# Patient Record
Sex: FEMALE | Race: BLACK OR AFRICAN AMERICAN | Employment: FULL TIME | URBAN - METROPOLITAN AREA
[De-identification: names, ages, dates, MRNs, and addresses within clinical notes are randomized per-mention and may not be internally consistent; named-entity substitution may affect disease eponyms.]

---

## 2017-01-03 ENCOUNTER — HOSPITAL ENCOUNTER (OUTPATIENT)
Dept: RADIATION THERAPY | Age: 47
Discharge: HOME | End: 2017-01-03
Payer: COMMERCIAL

## 2017-01-04 ENCOUNTER — HOSPITAL ENCOUNTER (OUTPATIENT)
Dept: RADIATION THERAPY | Age: 47
Discharge: HOME | End: 2017-01-04
Payer: COMMERCIAL

## 2017-01-04 PROCEDURE — 77412 RADIATION TX DELIVERY LVL 3: CPT

## 2017-01-05 ENCOUNTER — HOSPITAL ENCOUNTER (OUTPATIENT)
Dept: RADIATION THERAPY | Age: 47
Discharge: HOME | End: 2017-01-05
Payer: COMMERCIAL

## 2017-01-05 PROCEDURE — 77412 RADIATION TX DELIVERY LVL 3: CPT

## 2017-01-06 ENCOUNTER — HOSPITAL ENCOUNTER (OUTPATIENT)
Dept: RADIATION THERAPY | Age: 47
Discharge: HOME | End: 2017-01-06
Payer: COMMERCIAL

## 2017-01-06 PROCEDURE — 77412 RADIATION TX DELIVERY LVL 3: CPT

## 2017-01-09 ENCOUNTER — HOSPITAL ENCOUNTER (OUTPATIENT)
Dept: RADIATION THERAPY | Age: 47
Discharge: HOME | End: 2017-01-09
Payer: COMMERCIAL

## 2017-01-10 ENCOUNTER — HOSPITAL ENCOUNTER (OUTPATIENT)
Dept: RADIATION THERAPY | Age: 47
Discharge: HOME | End: 2017-01-10
Payer: COMMERCIAL

## 2017-01-10 PROCEDURE — 77412 RADIATION TX DELIVERY LVL 3: CPT

## 2017-01-11 ENCOUNTER — HOSPITAL ENCOUNTER (OUTPATIENT)
Dept: RADIATION THERAPY | Age: 47
Discharge: HOME | End: 2017-01-11
Payer: COMMERCIAL

## 2017-01-11 ENCOUNTER — APPOINTMENT (OUTPATIENT)
Dept: INFUSION THERAPY | Age: 47
End: 2017-01-11

## 2017-01-12 ENCOUNTER — DOCUMENTATION ONLY (OUTPATIENT)
Dept: SURGERY | Age: 47
End: 2017-01-12

## 2017-01-12 NOTE — PROGRESS NOTES
Confirm appointment/location w/patient. Advised pt to bring id & Insurance.  Per Patient, she has aetna have not received ins card

## 2017-01-13 ENCOUNTER — HOSPITAL ENCOUNTER (OUTPATIENT)
Dept: RADIATION THERAPY | Age: 47
Discharge: HOME | End: 2017-01-13
Payer: COMMERCIAL

## 2017-01-13 ENCOUNTER — OFFICE VISIT (OUTPATIENT)
Dept: SURGERY | Age: 47
End: 2017-01-13

## 2017-01-13 ENCOUNTER — TELEPHONE (OUTPATIENT)
Dept: SURGERY | Age: 47
End: 2017-01-13

## 2017-01-13 VITALS
HEIGHT: 63 IN | BODY MASS INDEX: 40.22 KG/M2 | DIASTOLIC BLOOD PRESSURE: 70 MMHG | WEIGHT: 227 LBS | HEART RATE: 81 BPM | SYSTOLIC BLOOD PRESSURE: 118 MMHG

## 2017-01-13 DIAGNOSIS — C50.411 MALIGNANT NEOPLASM OF UPPER-OUTER QUADRANT OF RIGHT FEMALE BREAST (HCC): Primary | ICD-10-CM

## 2017-01-13 PROBLEM — C50.919 BREAST CANCER (HCC): Status: ACTIVE | Noted: 2017-01-13

## 2017-01-13 NOTE — PROGRESS NOTES
HISTORY OF PRESENT ILLNESS  Luana Godoy is a 51 y.o. female. HPI  ESTABLISHED patient here for follow-up of RIGHT breast cancer. She is starting to feel much better. Had terrible fatigue with the XRT as well as \"nerve issues\" and had to stop. Did complete all but eight radiation treatments and last treatment was 1/10/16. Was so fatigued the next day that she had to stay in bed all day. Is having no problems with her breast at this point. Would like to have her port removed. 01/29/2016: RIGHT core bx of IMC with [facility]. ER+100%/TN+90% Her2- ([facility]: ER+80%/TN+20%)  03/31/2016: RIGHT lumpectomy with SNBx of 1.9 cm, gr2 ILC with DCIS and LCIS. 2/2 nodes involved. Clear margins.  pT1c pN1a Mx  05/17/2016: RIGHT ALND. 0/10 nodes involved  08/18/2016: completed adjuvant chemo (TC x4)  09/23/2016: started XRT, completed all but eight treatments with last treatment on 1/10/17    Past Medical History   Diagnosis Date    Arthritis      HANDS AND KNEES    Asthma     Cancer ([facility] [address] 2016     RIGHT breast cancer    Depression     GERD (gastroesophageal reflux disease)     Heart murmur     Hypertension     PUD (peptic ulcer disease)     Vertigo        Past Surgical History   Procedure Laterality Date    Hx cholecystectomy      Hx gyn       cesarian x 3    Hx hysterectomy       Still has ovaries    Hx gi       COLONOSCOPY    Hx urological       CYSTOSCOPY    Hx urological       BLADDER SLING    Hx orthopaedic Left      ARM FX WITH PINS    Hx tonsillectomy      Hx breast biopsy Right      SEVERAL TIMES    Hx breast lumpectomy Right 3/31/2016     RIGHT BREAST LUMPECTOMY, RIGHT SENTINEL NODE BIOPSY WITH ULTRASOUND performed by Fritz Zapata MD at [address] breast surgery procedure unlisted Right 5/17/16     RIGHT axillary lymph node dissection and PAC insertion       Social History     Social History    Marital status:      Spouse name: N/A    Number of children: N/A    Years of education: N/A     Occupational History    Not on file. Social History Main Topics    Smoking status: Never Smoker    Smokeless tobacco: Never Used    Alcohol use 0.0 oz/week     0 Standard drinks or equivalent per week      Comment: occasionally a glass of wine    Drug use: No    Sexual activity: Yes     Partners: Male     Birth control/ protection: None     Other Topics Concern    Not on file     Social History Narrative    Recently moved from [place], they have been here since April. . Current Outpatient Prescriptions on File Prior to Visit   Medication Sig Dispense Refill    diclofenac EC (VOLTAREN) 25 mg EC tablet TK 1 T PO  QID  0    cyanocobalamin 1,000 mcg tablet Take 1,000 mcg by mouth daily.  gabapentin (NEURONTIN) 100 mg capsule Take 1 Cap by mouth three (3) times daily. 90 Cap 1    zolpidem (AMBIEN) 10 mg tablet 20 mg.  2    VENTOLIN HFA 90 mcg/actuation inhaler       citalopram (CELEXA) 40 mg tablet Take 40 mg by mouth nightly.  losartan (COZAAR) 25 mg tablet Take 1 Tab by mouth daily. 90 Tab 1     No current facility-administered medications on file prior to visit. Allergies   Allergen Reactions    Adhesive Tape-Silicones Rash and Itching    Darvocet A500 [Propoxyphene N-Acetaminophen] Nausea Only and Other (comments)     headache    Diazepam Other (comments)     Not an allergy, just does not work for her at all.  Hydrocodone Nausea and Vomiting and Vertigo    Percocet [Oxycodone-Acetaminophen] Nausea Only and Other (comments)     headache    Tylenol-Codeine #2 Itching, Nausea Only and Other (comments)     HEADACHES         OB History     Obstetric Comments    Menarche:  8. LMP: 38.  # of Children:  3. Age at Delivery of First Child:  20.   Hysterectomy/oophorectomy:  YES/NO. Breast Bx:  Yes,. Hx of Breast Feeding:  Yes. BCP:  Yes, in the past. Hormone therapy:  Yes. ROS  Constitutional: Negative    HENT: Negative.    Eyes: Negative. Respiratory: Negative. Cardiovascular: Negative. Gastrointestinal: Negative. Genitourinary: Negative. Musculoskeletal: Negative. Skin: Negative. Neurological: Negative. Endo/Heme/Allergies: Negative. Psychiatric/Behavioral: Negative. Physical Exam   Cardiovascular: Normal rate and normal heart sounds. Pulmonary/Chest: Breath sounds normal. Right breast exhibits no inverted nipple, no mass, no nipple discharge, no skin change and no tenderness. Left breast exhibits no inverted nipple, no mass, no nipple discharge, no skin change and no tenderness. Breasts are symmetrical.       Lymphadenopathy:        Right cervical: No superficial cervical, no deep cervical and no posterior cervical adenopathy present. Left cervical: No superficial cervical, no deep cervical and no posterior cervical adenopathy present. Right axillary: No pectoral and no lateral adenopathy present. Left axillary: No pectoral and no lateral adenopathy present. ASSESSMENT and PLAN    ICD-10-CM ICD-9-CM    1. Malignant neoplasm of upper-outer quadrant of right female breast (HCC) C50.411 174.4      Pt s/p XRT for RIGHT BC and doing well. Will schedule port removal. Will schedule mammo in 6 months and f/u with Cam Gianni (NP) then. This plan was reviewed with the patient and patient agrees. All questions were answered.     Written by Marie Dickey, as dictated by Dr. Candance Reading, MD.

## 2017-01-13 NOTE — COMMUNICATION BODY
HISTORY OF PRESENT ILLNESS  Luana Godoy is a 51 y.o. female. HPI  ESTABLISHED patient here for follow-up of RIGHT breast cancer. She is starting to feel much better. Had terrible fatigue with the XRT as well as \"nerve issues\" and had to stop. Did complete all but eight radiation treatments and last treatment was 1/10/16. Was so fatigued the next day that she had to stay in bed all day. Is having no problems with her breast at this point. Would like to have her port removed. 01/29/2016: RIGHT core bx of IMC with [facility]. ER+100%/AK+90% Her2- ([facility]: ER+80%/AK+20%)  03/31/2016: RIGHT lumpectomy with SNBx of 1.9 cm, gr2 ILC with DCIS and LCIS. 2/2 nodes involved. Clear margins.  pT1c pN1a Mx  05/17/2016: RIGHT ALND. 0/10 nodes involved  08/18/2016: completed adjuvant chemo (TC x4)  09/23/2016: started XRT, completed all but eight treatments with last treatment on 1/10/17    Past Medical History   Diagnosis Date    Arthritis      HANDS AND KNEES    Asthma     Cancer ([facility] [address] 2016     RIGHT breast cancer    Depression     GERD (gastroesophageal reflux disease)     Heart murmur     Hypertension     PUD (peptic ulcer disease)     Vertigo        Past Surgical History   Procedure Laterality Date    Hx cholecystectomy      Hx gyn       cesarian x 3    Hx hysterectomy       Still has ovaries    Hx gi       COLONOSCOPY    Hx urological       CYSTOSCOPY    Hx urological       BLADDER SLING    Hx orthopaedic Left      ARM FX WITH PINS    Hx tonsillectomy      Hx breast biopsy Right      SEVERAL TIMES    Hx breast lumpectomy Right 3/31/2016     RIGHT BREAST LUMPECTOMY, RIGHT SENTINEL NODE BIOPSY WITH ULTRASOUND performed by Fritz Zapata MD at [address] breast surgery procedure unlisted Right 5/17/16     RIGHT axillary lymph node dissection and PAC insertion       Social History     Social History    Marital status:      Spouse name: N/A    Number of children: N/A    Years of education: N/A     Occupational History    Not on file. Social History Main Topics    Smoking status: Never Smoker    Smokeless tobacco: Never Used    Alcohol use 0.0 oz/week     0 Standard drinks or equivalent per week      Comment: occasionally a glass of wine    Drug use: No    Sexual activity: Yes     Partners: Male     Birth control/ protection: None     Other Topics Concern    Not on file     Social History Narrative    Recently moved from [place], they have been here since April. . Current Outpatient Prescriptions on File Prior to Visit   Medication Sig Dispense Refill    diclofenac EC (VOLTAREN) 25 mg EC tablet TK 1 T PO  QID  0    cyanocobalamin 1,000 mcg tablet Take 1,000 mcg by mouth daily.  gabapentin (NEURONTIN) 100 mg capsule Take 1 Cap by mouth three (3) times daily. 90 Cap 1    zolpidem (AMBIEN) 10 mg tablet 20 mg.  2    VENTOLIN HFA 90 mcg/actuation inhaler       citalopram (CELEXA) 40 mg tablet Take 40 mg by mouth nightly.  losartan (COZAAR) 25 mg tablet Take 1 Tab by mouth daily. 90 Tab 1     No current facility-administered medications on file prior to visit. Allergies   Allergen Reactions    Adhesive Tape-Silicones Rash and Itching    Darvocet A500 [Propoxyphene N-Acetaminophen] Nausea Only and Other (comments)     headache    Diazepam Other (comments)     Not an allergy, just does not work for her at all.  Hydrocodone Nausea and Vomiting and Vertigo    Percocet [Oxycodone-Acetaminophen] Nausea Only and Other (comments)     headache    Tylenol-Codeine #2 Itching, Nausea Only and Other (comments)     HEADACHES         OB History     Obstetric Comments    Menarche:  8. LMP: 38.  # of Children:  3. Age at Delivery of First Child:  20.   Hysterectomy/oophorectomy:  YES/NO. Breast Bx:  Yes,. Hx of Breast Feeding:  Yes. BCP:  Yes, in the past. Hormone therapy:  Yes. ROS  Constitutional: Negative    HENT: Negative.    Eyes: Negative. Respiratory: Negative. Cardiovascular: Negative. Gastrointestinal: Negative. Genitourinary: Negative. Musculoskeletal: Negative. Skin: Negative. Neurological: Negative. Endo/Heme/Allergies: Negative. Psychiatric/Behavioral: Negative. Physical Exam   Cardiovascular: Normal rate and normal heart sounds. Pulmonary/Chest: Breath sounds normal. Right breast exhibits no inverted nipple, no mass, no nipple discharge, no skin change and no tenderness. Left breast exhibits no inverted nipple, no mass, no nipple discharge, no skin change and no tenderness. Breasts are symmetrical.       Lymphadenopathy:        Right cervical: No superficial cervical, no deep cervical and no posterior cervical adenopathy present. Left cervical: No superficial cervical, no deep cervical and no posterior cervical adenopathy present. Right axillary: No pectoral and no lateral adenopathy present. Left axillary: No pectoral and no lateral adenopathy present. ASSESSMENT and PLAN    ICD-10-CM ICD-9-CM    1. Malignant neoplasm of upper-outer quadrant of right female breast (HCC) C50.411 174.4      Pt s/p XRT for RIGHT BC and doing well. Will schedule port removal. Will schedule mammo in 6 months and f/u with [facility] (NP) then. This plan was reviewed with the patient and patient agrees. All questions were answered.     Written by Nino Zapata, as dictated by Dr. Umair Samuels MD.

## 2017-01-13 NOTE — PATIENT INSTRUCTIONS
Dealing With Being Tired From Cancer Treatment: Care Instructions  Your Care Instructions  Cancer, some complications from cancer, and some cancer treatments can make you feel very tired. But there are things you can do to feel better. Try to get extra rest while you have chemotherapy or radiation therapy. You may be able to stay with much of your routine, with some extra breaks for rest. It is important to stay as active as you can during treatment. Staying active can help you have more energy. Feeling tired is often worse at the end of these treatments or just after they end. Your doctor may prescribe medicines to help boost your energy. Follow-up care is a key part of your treatment and safety. Be sure to make and go to all appointments, and call your doctor if you are having problems. It's also a good idea to know your test results and keep a list of the medicines you take. How can you care for yourself at home? · Slowly increase your activity to have more energy. Try walking as your energy allows. · Take breaks during the day to rest.  · If your doctor prescribes medicines to help with your energy, take them exactly as prescribed. Call your doctor if you think you are having a problem with your medicine. · Keep a list of all your medicines, and review them with your doctor at your visits. · Eat healthy foods. A diet that contains fruits, vegetables, and whole grains may increase your energy level. Limit alcohol, which can cause dehydration and make you feel more tired. Drink plenty of fluids. Do not skip meals, especially breakfast.  · Do not smoke. It can make you feel more tired. If you need help quitting, talk to your doctor about stop-smoking programs and medicines. These can increase your chances of quitting for good. · If you have trouble getting a good night's sleep, try to:  ¨ Keep your room dark and quiet. If needed, wear earplugs or use a sound machine to cover noises.   ¨ Do not eat just before you go to bed. ¨ Do not read or watch TV in bed. · Plan activities for the time of day when you have the most energy. This way you can plan ahead to do what you want to do. When should you call for help? Call your doctor now or seek immediate medical care if:  · You feel very depressed or anxious. Watch closely for changes in your health, and be sure to contact your doctor if:  · You are still very tired even with home treatment. · You get more and more tired. Where can you learn more? Go to http://pascual-senthil.info/. Enter H458 in the search box to learn more about \"Dealing With Being Tired From Cancer Treatment: Care Instructions. \"  Current as of: July 26, 2016  Content Version: 11.1  © 4754-0305 [employer]. Care instructions adapted under license by [employer] (which disclaims liability or warranty for this information). If you have questions about a medical condition or this instruction, always ask your healthcare professional. [address] any warranty or liability for your use of this information.

## 2017-01-13 NOTE — MR AVS SNAPSHOT
Visit Information Date & Time Provider Department Dept. Phone Encounter #  
 1/13/2017 55:28 AM Jose Greer MD [address] at [address] [place] [address] [id number] Your Appointments 7/11/2017  1:30 PM  
Follow Up with [address] at [employer] [facility]) Appt Note: LEFT breast 6 month follow up Cp$0 1/13/17; LEFT breast 6 month follow up Cp$0 1/13/17 [address]  
  
   
 [address] Upcoming Health Maintenance Date Due Pneumococcal 19-64 Highest Risk (1 of 3 - PCV13) 10/21/1988 DTaP/Tdap/Td series (1 - Tdap) 10/21/1990 PAP AKA CERVICAL CYTOLOGY 10/21/1990 INFLUENZA AGE 9 TO ADULT 8/1/2016 Allergies as of 1/13/2017  Review Complete On: 1/13/2017 By: Jolyne Bosworth, RN Severity Noted Reaction Type Reactions Adhesive Tape-silicones  43/46/3277    Rash, Itching Darvocet A500 [Propoxyphene N-acetaminophen]  01/29/2016    Nausea Only, Other (comments)  
 headache Diazepam  05/04/2016    Other (comments) Not an allergy, just does not work for her at all. Hydrocodone  04/25/2016    Nausea and Vomiting, Vertigo Percocet [Oxycodone-acetaminophen]  01/29/2016    Nausea Only, Other (comments)  
 headache Tylenol-codeine #2  03/24/2016    Itching, Nausea Only, Other (comments) HEADACHES Current Immunizations  Reviewed on 11/30/2016 No immunizations on file. Not reviewed this visit Vitals BP Pulse Height(growth percentile) Weight(growth percentile) LMP BMI  
 118/70 (BP 1 Location: Left arm, BP Patient Position: Sitting) 81 5' 3\" (1.6 m) 227 lb (103 kg) 07/04/2014 (Approximate) 40.21 kg/m2 OB Status Smoking Status Hysterectomy Never Smoker BMI and BSA Data  Body Mass Index Body Surface Area  
 40.21 kg/m 2 2.14 m 2  
  
  
 Preferred Pharmacy Pharmacy Name Phone [facility] Kenan Constantino, [address] [facility] [phone] Your Updated Medication List  
  
   
This list is accurate as of: 1/13/17 12:39 PM.  Always use your most recent med list.  
  
  
  
  
 citalopram 40 mg tablet Commonly known as:  John Mao Take 40 mg by mouth nightly. cyanocobalamin 1,000 mcg tablet Take 1,000 mcg by mouth daily. diclofenac EC 25 mg EC tablet Commonly known as:  VOLTAREN  
TK 1 T PO  QID  
  
 gabapentin 100 mg capsule Commonly known as:  NEURONTIN Take 1 Cap by mouth three (3) times daily. losartan 25 mg tablet Commonly known as:  COZAAR Take 1 Tab by mouth daily. VENTOLIN HFA 90 mcg/actuation inhaler Generic drug:  albuterol  
  
 zolpidem 10 mg tablet Commonly known as:  AMBIEN  
20 mg. To-Do List   
 01/13/2017  3:45 PM  
  Appointment with RAD ONC THERAPY [facility] at [address] [place] [address] ([phone])  
  
 01/16/2017 3:45 PM  
  Appointment with RAD ONC THERAPY [facility] at [address] [place] [address] ([phone])  
  
 01/17/2017 3:45 PM  
  Appointment with RAD ONC THERAPY [facility] at [address] [place] [address] ([phone])  
  
 01/18/2017 3:45 PM  
  Appointment with RAD ONC THERAPY [facility] at [address] [place] [address] ([phone])  
  
 01/25/2017 10:30 AM  
  Appointment with Reggie Roca at [facility] ([phone])  
  
 07/11/2017 12:30 PM  
  Appointment with [facility] [address] at [address] [place] [address] ([phone]) Shower or bathe using soap and water. Do not use deodorant, powder, perfumes, or lotion the day of your exam.  If your prior mammograms were not performed at [facility] please bring films with you or forward prior images 2 days before your procedure.   If patient is not a callback diagnostic, the patient must have an order/script from the physician for the diagnostic. Please bring it on the day of the mammogram or have it faxed to the department. [facility]  [phone] [facility] [address] [place]  [phone] [phone] [place] [address] [facility] [phone] [place]mie [place] [phone] Patient Instructions Dealing With Being Tired From Cancer Treatment: Care Instructions Your Care Instructions Cancer, some complications from cancer, and some cancer treatments can make you feel very tired. But there are things you can do to feel better. Try to get extra rest while you have chemotherapy or radiation therapy. You may be able to stay with much of your routine, with some extra breaks for rest. It is important to stay as active as you can during treatment. Staying active can help you have more energy. Feeling tired is often worse at the end of these treatments or just after they end. Your doctor may prescribe medicines to help boost your energy. Follow-up care is a key part of your treatment and safety. Be sure to make and go to all appointments, and call your doctor if you are having problems. It's also a good idea to know your test results and keep a list of the medicines you take. How can you care for yourself at home? · Slowly increase your activity to have more energy. Try walking as your energy allows. · Take breaks during the day to rest. 
· If your doctor prescribes medicines to help with your energy, take them exactly as prescribed. Call your doctor if you think you are having a problem with your medicine. · Keep a list of all your medicines, and review them with your doctor at your visits. · Eat healthy foods. A diet that contains fruits, vegetables, and whole grains may increase your energy level. Limit alcohol, which can cause dehydration and make you feel more tired. Drink plenty of fluids. Do not skip meals, especially breakfast. 
· Do not smoke. It can make you feel more tired.  If you need help quitting, talk to your doctor about stop-smoking programs and medicines. These can increase your chances of quitting for good. · If you have trouble getting a good night's sleep, try to: ¨ Keep your room dark and quiet. If needed, wear earplugs or use a sound machine to cover noises. ¨ Do not eat just before you go to bed. ¨ Do not read or watch TV in bed. · Plan activities for the time of day when you have the most energy. This way you can plan ahead to do what you want to do. When should you call for help? Call your doctor now or seek immediate medical care if: 
· You feel very depressed or anxious. Watch closely for changes in your health, and be sure to contact your doctor if: 
· You are still very tired even with home treatment. · You get more and more tired. Where can you learn more? Go to http://pascual-senthil.info/. Enter O050 in the search box to learn more about \"Dealing With Being Tired From Cancer Treatment: Care Instructions. \" Current as of: July 26, 2016 Content Version: 11.1 © 4691-0573 [employer]. Care instructions adapted under license by [employer] (which disclaims liability or warranty for this information). If you have questions about a medical condition or this instruction, always ask your healthcare professional. [address] any warranty or liability for your use of this information. Introducing [facility]! Dear Max Rodgers: Thank you for requesting a Mysafeplace account. Our records indicate that you already have an active Mysafeplace account. You can access your account anytime at [email or web address] Did you know that you can access your hospital and ER discharge instructions at any time in Mysafeplace? You can also review all of your test results from your hospital stay or ER visit. Additional Information If you have questions, please visit the Frequently Asked Questions section of the [employer] website at [email or web address]. [email or web address]. [email or web address] Remember, [employer] is NOT to be used for urgent needs. For medical emergencies, dial 911. Now available from your iPhone and Android! Please provide this summary of care documentation to your next provider. Your primary care clinician is listed as Dilan Lozano. If you have any questions after today's visit, please call [phone].

## 2017-01-13 NOTE — PROGRESS NOTES
HISTORY OF PRESENT ILLNESS  Nadine Whiting is a 51 y.o. female. HPI    ESTABLISHED patient here for follow-up of RIGHT breast cancer. She is starting to feel much better. Had terrible fatigue with the XRT as well as \"nerve issues\" and had to stop. Did complete all but eight radiation treatments and last treatment was 1/10/16. Was so fatigued the next day that she had to stay in bed all day. Is having no problems with her breast at this point. Would like to have her port removed. 01/29/2016: RIGHT core bx of IMC with [facility]. ER+100%/OK+90% Her2- ([facility]: ER+80%/OK+20%)  03/31/2016: RIGHT lumpectomy with SNBx of 1.9 cm, gr2 ILC with DCIS and LCIS. 2/2 nodes involved. Clear margins.  pT1c pN1a Mx  05/17/2016: RIGHT ALND. 0/10 nodes involved  08/18/2016: completed adjuvant chemo (TC x4)  09/23/2016: started XRT, completed all but eight treatments with last treatment on 1/10/17    ROS    Physical Exam    ASSESSMENT and PLAN  {ASSESSMENT/PLAN:57747}

## 2017-01-16 ENCOUNTER — HOSPITAL ENCOUNTER (OUTPATIENT)
Dept: RADIATION THERAPY | Age: 47
Discharge: HOME | End: 2017-01-16
Payer: COMMERCIAL

## 2017-01-17 ENCOUNTER — HOSPITAL ENCOUNTER (OUTPATIENT)
Dept: RADIATION THERAPY | Age: 47
Discharge: HOME | End: 2017-01-17
Payer: COMMERCIAL

## 2017-01-18 ENCOUNTER — HOSPITAL ENCOUNTER (OUTPATIENT)
Dept: RADIATION THERAPY | Age: 47
Discharge: HOME | End: 2017-01-18
Payer: COMMERCIAL

## 2017-01-23 DIAGNOSIS — C50.411 MALIGNANT NEOPLASM OF UPPER-OUTER QUADRANT OF RIGHT FEMALE BREAST (HCC): Primary | ICD-10-CM

## 2017-01-25 ENCOUNTER — HOSPITAL ENCOUNTER (OUTPATIENT)
Dept: INFUSION THERAPY | Age: 47
Discharge: HOME | End: 2017-01-25

## 2017-01-25 RX ORDER — SODIUM CHLORIDE 0.9 % (FLUSH) 0.9 %
10-40 SYRINGE (ML) INJECTION AS NEEDED
Status: ACTIVE | OUTPATIENT
Start: 2017-01-25 | End: 2017-01-26

## 2017-01-25 RX ORDER — SODIUM CHLORIDE 9 MG/ML
10 INJECTION INTRAMUSCULAR; INTRAVENOUS; SUBCUTANEOUS AS NEEDED
Status: ACTIVE | OUTPATIENT
Start: 2017-01-25 | End: 2017-01-26

## 2017-01-25 RX ORDER — HEPARIN 100 UNIT/ML
500 SYRINGE INTRAVENOUS AS NEEDED
Status: ACTIVE | OUTPATIENT
Start: 2017-01-25 | End: 2017-01-26

## 2017-01-31 ENCOUNTER — TELEPHONE (OUTPATIENT)
Dept: ONCOLOGY | Age: 47
End: 2017-01-31

## 2017-01-31 NOTE — LETTER
1/31/2017 12:25 PM 
 
Ms. Hinkle Pu 
[address] [facility] [id number] Janie Dakin: 
 
Dr. Baron Aguiar wanted us to reach out to you and schedule a follow up appointment.  
 
 
 
Sincerely, 
 
 
Helyn Lanes, NP

## 2017-01-31 NOTE — LETTER
1/31/2017 12:26 PM 
 
Ms. Webber Manus 
[address] [facility] [phone] Nash Medrano: Hope you are doing well. Dr. Katia Wilkes would like to see you. Please call us when it is convenient at [phone] to schedule an appointment. Thank you!  
 
 
 
Sincerely, 
 
 
 
Jani HDZN

## 2017-01-31 NOTE — TELEPHONE ENCOUNTER
Called patient to check on her and discuss the need for follow-up in the office. Called once and no answer, received message saying voicemail was not set up. Called back once more to confirm correct number and received message stating person was not accepting calls right now.

## 2017-01-31 NOTE — TELEPHONE ENCOUNTER
----- Message from Eder Gallo DO sent at 1/31/2017  7:11 AM EST -----  Regarding: RE: Needs appt please with Dr. Ana Rush  Make sure all is documented in [address] [place] [address]    ----- Message -----     From: Kelin Moyer NP     Sent: 1/30/2017   9:53 AM       To: Eder Gallo DO  Subject: FW: Needs appt please with Dr. Jose Cummings.  ----- Message -----     From: Arben Barba     Sent: 1/30/2017   9:03 AM       To: Dong Gallo RN, Kelin Moyer NP, #  Subject: RE: Needs appt please with Dr. Landry Jones and I have been unable to reach her. We have scheduled her appt however. Scheduled for Feb 9, at 9:00am.      ----- Message -----     From: Dong Gallo RN     Sent: 1/26/2017   3:48 PM       To: Kelin Moyer NP, [facility] Office  Subject: Needs appt please with Dr. Ana Rush                         ----- Message -----     From: Kelin Moyer NP     Sent: 1/24/2017  12:33 PM       To: Eder Gallo DO, Dong Gallo RN, #    Patient finished radiation on 3/78/20 per Dr. Kings Young note. She needs follow-up with Dr. Wang Thurman as soon as possible to start hormonal therapy. Please call patient to set this up.

## 2017-02-09 ENCOUNTER — OFFICE VISIT (OUTPATIENT)
Dept: ONCOLOGY | Age: 47
End: 2017-02-09

## 2017-02-09 ENCOUNTER — HOSPITAL ENCOUNTER (OUTPATIENT)
Age: 47
Setting detail: OUTPATIENT SURGERY
Discharge: HOME | End: 2017-02-09
Attending: SURGERY | Admitting: SURGERY
Payer: COMMERCIAL

## 2017-02-09 ENCOUNTER — SURGERY (OUTPATIENT)
Age: 47
End: 2017-02-09

## 2017-02-09 VITALS
HEIGHT: 63 IN | RESPIRATION RATE: 16 BRPM | SYSTOLIC BLOOD PRESSURE: 142 MMHG | BODY MASS INDEX: 40 KG/M2 | HEART RATE: 83 BPM | OXYGEN SATURATION: 95 % | DIASTOLIC BLOOD PRESSURE: 85 MMHG | TEMPERATURE: 97.4 F | WEIGHT: 225.75 LBS

## 2017-02-09 VITALS
BODY MASS INDEX: 40.01 KG/M2 | SYSTOLIC BLOOD PRESSURE: 124 MMHG | OXYGEN SATURATION: 98 % | DIASTOLIC BLOOD PRESSURE: 92 MMHG | HEIGHT: 63 IN | HEART RATE: 90 BPM | TEMPERATURE: 98.1 F | RESPIRATION RATE: 16 BRPM | WEIGHT: 225.8 LBS

## 2017-02-09 DIAGNOSIS — Z95.828 PORT CATHETER IN PLACE: ICD-10-CM

## 2017-02-09 DIAGNOSIS — C50.911 MALIGNANT NEOPLASM OF RIGHT BREAST, STAGE 2 (HCC): Primary | ICD-10-CM

## 2017-02-09 DIAGNOSIS — C77.3 BREAST CANCER METASTASIZED TO AXILLARY LYMPH NODE, RIGHT (HCC): ICD-10-CM

## 2017-02-09 DIAGNOSIS — C50.411 MALIGNANT NEOPLASM OF UPPER-OUTER QUADRANT OF RIGHT FEMALE BREAST (HCC): ICD-10-CM

## 2017-02-09 DIAGNOSIS — Z98.890 S/P LUMPECTOMY, RIGHT BREAST: ICD-10-CM

## 2017-02-09 DIAGNOSIS — C50.911 BREAST CANCER METASTASIZED TO AXILLARY LYMPH NODE, RIGHT (HCC): ICD-10-CM

## 2017-02-09 PROCEDURE — 77030011267 HC ELECTRD BLD COVD -A: Performed by: SURGERY

## 2017-02-09 PROCEDURE — 77030031139 HC SUT VCRL2 J&J -A: Performed by: SURGERY

## 2017-02-09 PROCEDURE — 76210000046 HC AMBSU PH II REC FIRST 0.5 HR: Performed by: SURGERY

## 2017-02-09 PROCEDURE — 74011000250 HC RX REV CODE- 250: Performed by: SURGERY

## 2017-02-09 PROCEDURE — 76030000002 HC AMB SURG OR TIME FIRST 0.: Performed by: SURGERY

## 2017-02-09 PROCEDURE — 77030018836 HC SOL IRR NACL ICUM -A: Performed by: SURGERY

## 2017-02-09 PROCEDURE — 77030002933 HC SUT MCRYL J&J -A: Performed by: SURGERY

## 2017-02-09 PROCEDURE — 77030010507 HC ADH SKN DERMBND J&J -B: Performed by: SURGERY

## 2017-02-09 PROCEDURE — 77030011640 HC PAD GRND REM COVD -A: Performed by: SURGERY

## 2017-02-09 RX ORDER — ANASTROZOLE 1 MG/1
1 TABLET ORAL DAILY
Qty: 90 TAB | Refills: 3 | Status: SHIPPED | OUTPATIENT
Start: 2017-02-09 | End: 2017-05-19 | Stop reason: ALTCHOICE

## 2017-02-09 RX ORDER — CITALOPRAM 20 MG/1
TABLET, FILM COATED ORAL
Refills: 5 | COMMUNITY
Start: 2016-11-04 | End: 2017-04-24 | Stop reason: DRUGHIGH

## 2017-02-09 RX ORDER — HYDROMORPHONE HYDROCHLORIDE 2 MG/1
2 TABLET ORAL
Qty: 15 TAB | Refills: 0 | Status: SHIPPED | OUTPATIENT
Start: 2017-02-09 | End: 2017-04-24

## 2017-02-09 RX ORDER — ZINC GLUCONATE 50 MG
TABLET ORAL
Refills: 2 | COMMUNITY
Start: 2016-12-12 | End: 2017-02-09 | Stop reason: SDUPTHER

## 2017-02-09 RX ORDER — METFORMIN HYDROCHLORIDE 500 MG/1
TABLET ORAL
Refills: 3 | Status: ON HOLD | COMMUNITY
Start: 2016-11-11 | End: 2017-02-09 | Stop reason: ALTCHOICE

## 2017-02-09 RX ORDER — MELOXICAM 15 MG/1
TABLET ORAL
Refills: 3 | COMMUNITY
Start: 2016-11-14 | End: 2017-04-24

## 2017-02-09 RX ORDER — AMOXICILLIN AND CLAVULANATE POTASSIUM 875; 125 MG/1; MG/1
TABLET, FILM COATED ORAL
Refills: 1 | Status: ON HOLD | COMMUNITY
Start: 2016-12-28 | End: 2017-02-09 | Stop reason: ALTCHOICE

## 2017-02-09 RX ORDER — PANTOPRAZOLE SODIUM 40 MG/1
TABLET, DELAYED RELEASE ORAL
Refills: 2 | COMMUNITY
Start: 2016-11-18 | End: 2017-04-24

## 2017-02-09 RX ORDER — GABAPENTIN 300 MG/1
CAPSULE ORAL
Refills: 1 | COMMUNITY
Start: 2016-11-18 | End: 2017-04-24 | Stop reason: SDUPTHER

## 2017-02-09 RX ORDER — ZOLPIDEM TARTRATE 12.5 MG/1
TABLET, FILM COATED, EXTENDED RELEASE ORAL
Refills: 2 | COMMUNITY
Start: 2016-12-08 | End: 2017-07-11

## 2017-02-09 RX ORDER — BUPIVACAINE HYDROCHLORIDE AND EPINEPHRINE 5; 5 MG/ML; UG/ML
30 INJECTION, SOLUTION EPIDURAL; INTRACAUDAL; PERINEURAL ONCE
Status: CANCELLED | OUTPATIENT
Start: 2017-02-09 | End: 2017-02-09

## 2017-02-09 RX ORDER — LIDOCAINE HYDROCHLORIDE AND EPINEPHRINE 10; 10 MG/ML; UG/ML
30 INJECTION, SOLUTION INFILTRATION; PERINEURAL ONCE
Status: CANCELLED | OUTPATIENT
Start: 2017-02-09 | End: 2017-02-09

## 2017-02-09 RX ADMIN — BUPIVACAINE HYDROCHLORIDE AND EPINEPHRINE BITARTRATE 15 ML: 5; .005 INJECTION, SOLUTION EPIDURAL; INTRACAUDAL; PERINEURAL at 12:49

## 2017-02-09 NOTE — IP AVS SNAPSHOT
[address] 
[phone] Patient: Shelli Jo MRN: [id number] :1970 You are allergic to the following Allergen Reactions Adhesive Tape-Silicones Rash Itching Darvocet A500 (Propoxyphene N-Acetaminophen) Nausea Only Other (comments)  
 headache Diazepam Other (comments) Not an allergy, just does not work for her at all. Hydrocodone Nausea and Vomiting Vertigo Percocet (Oxycodone-Acetaminophen) Nausea Only Other (comments)  
 headache Tylenol-Codeine #2 Itching Nausea Only Other (comments) HEADACHES Recent Documentation Height Weight BMI OB Status Smoking Status 1.6 m 102.4 kg 39.99 kg/m2 Hysterectomy Never Smoker Emergency Contacts Name Discharge Info Relation Home Work Mobile RileyJoão CAREGIVER [3] Spouse [3] [phone] About your hospitalization You were admitted on:  2017 You last received care in the:  [facility] PACU You were discharged on:  2017 Unit phone number:  [phone] Why you were hospitalized Your primary diagnosis was:  Not on File Providers Seen During Your Hospitalizations Provider Role Specialty Primary office phone Gilmer Cook MD Attending Provider Breast Surgery [phone] Your Primary Care Physician (PCP) Primary Care Physician Office Phone Office Fax Santhosh Parmar [phone] Follow-up Information Follow up With Details Comments Contact Info Na lÁvarez MD   [address] [facility] [address] 
[phone] Current Discharge Medication List  
  
START taking these medications Dose & Instructions Dispensing Information Comments Morning Noon Evening Bedtime HYDROmorphone 2 mg tablet Commonly known as:  DILAUDID Your next dose is: Today, Tomorrow Other:  _________ Dose:  2 mg Take 1 Tab by mouth every four (4) hours as needed for Pain. Max Daily Amount: 12 mg. Quantity:  15 Tab Refills:  0 CONTINUE these medications which have CHANGED Dose & Instructions Dispensing Information Comments Morning Noon Evening Bedtime  
 zolpidem CR 12.5 mg tablet Commonly known as:  AMBIEN CR What changed:  Another medication with the same name was removed. Continue taking this medication, and follow the directions you see here. Your next dose is: Today, Tomorrow Other:  _________ TK 1 T PO QD HS PRN Refills:  2 CONTINUE these medications which have NOT CHANGED Dose & Instructions Dispensing Information Comments Morning Noon Evening Bedtime  
 anastrozole 1 mg tablet Commonly known as:  ARIMIDEX Your next dose is: Today, Tomorrow Other:  _________ Dose:  1 mg Take 1 Tab by mouth daily. Indications: HORMONE RECEPTOR POSITIVE BREAST CANCER Quantity:  90 Tab Refills:  3  
     
   
   
   
  
 * citalopram 40 mg tablet Commonly known as:  Ana Babe Your next dose is: Today, Tomorrow Other:  _________ Dose:  40 mg Take 40 mg by mouth nightly. Refills:  0  
     
   
   
   
  
 * citalopram 20 mg tablet Commonly known as:  Ana Babe Your next dose is: Today, Tomorrow Other:  _________ TK 1 T PO QD Refills:  5  
     
   
   
   
  
 cyanocobalamin 1,000 mcg tablet Your next dose is: Today, Tomorrow Other:  _________ Dose:  1000 mcg Take 1,000 mcg by mouth daily. Refills:  0  
     
   
   
   
  
 diclofenac EC 25 mg EC tablet Commonly known as:  VOLTAREN Your next dose is: Today, Tomorrow Other:  _________ TK 1 T PO  QID Refills:  0  
     
   
   
   
  
 * gabapentin 100 mg capsule Commonly known as:  NEURONTIN Your next dose is: Today, Tomorrow Other:  _________ Dose:  100 mg Take 1 Cap by mouth three (3) times daily. Quantity:  90 Cap Refills:  1  
     
   
   
   
  
 * gabapentin 300 mg capsule Commonly known as:  NEURONTIN Your next dose is: Today, Tomorrow Other:  _________ TK 1 C PO BID Refills:  1  
     
   
   
   
  
 losartan 25 mg tablet Commonly known as:  COZAAR Your next dose is: Today, Tomorrow Other:  _________ Dose:  25 mg Take 1 Tab by mouth daily. Quantity:  90 Tab Refills:  1  
     
   
   
   
  
 meloxicam 15 mg tablet Commonly known as:  MOBIC Your next dose is: Today, Tomorrow Other:  _________ TK 1 T PO QD Refills:  3  
     
   
   
   
  
 pantoprazole 40 mg tablet Commonly known as:  PROTONIX Your next dose is: Today, Tomorrow Other:  _________ TK 1 T PO QD Refills:  2 VENTOLIN HFA 90 mcg/actuation inhaler Generic drug:  albuterol Your next dose is: Today, Tomorrow Other:  _________ Refills:  0  
     
   
   
   
  
 * Notice: This list has 4 medication(s) that are the same as other medications prescribed for you. Read the directions carefully, and ask your doctor or other care provider to review them with you. ASK your doctor about these medications Dose & Instructions Dispensing Information Comments Morning Noon Evening Bedtime  
 amoxicillin-clavulanate 875-125 mg per tablet Commonly known as:  AUGMENTIN Your next dose is: Today, Tomorrow Other:  _________ TK 1 T PO BID Refills:  1  
     
   
   
   
  
 metFORMIN 500 mg tablet Commonly known as:  GLUCOPHAGE Your next dose is: Today, Tomorrow Other:  _________ TK 1 T PO D WITH DINNER Refills:  3 Where to Get Your Medications Information on where to get these meds will be given to you by the nurse or doctor. ! Ask your nurse or doctor about these medications HYDROmorphone 2 mg tablet Discharge Instructions May shower, no heavy lifting today, ice as needed. DISCHARGE SUMMARY from Nurse The following personal items are in your possession at time of discharge: 
 
Dental Appliances: None Clothing:  (clothes and purse locked in locker, ok per pt) PATIENT INSTRUCTIONS: 
 
 
F-face looks uneven A-arms unable to move or move unevenly S-speech slurred or non-existent T-time-call 911 as soon as signs and symptoms begin-DO NOT go Back to bed or wait to see if you get better-TIME IS BRAIN. Warning Signs of HEART ATTACK Call 911 if you have these symptoms: 
? Chest discomfort. Most heart attacks involve discomfort in the center of the chest that lasts more than a few minutes, or that goes away and comes back. It can feel like uncomfortable pressure, squeezing, fullness, or pain. ? Discomfort in other areas of the upper body. Symptoms can include pain or discomfort in one or both arms, the back, neck, jaw, or stomach. ? Shortness of breath with or without chest discomfort. ? Other signs may include breaking out in a cold sweat, nausea, or lightheadedness. Don't wait more than five minutes to call [address]! Fast action can save your life. Calling 911 is almost always the fastest way to get lifesaving treatment.  Emergency Medical Services staff can begin treatment when they arrive  up to an hour sooner than if someone gets to the hospital by car. The discharge information has been reviewed with the patient. The patient verbalized understanding. Discharge medications reviewed with the patient and appropriate educational materials and side effects teaching were provided. Discharge Orders None Introducing [facility]! Dear Tamanna Shelton: Thank you for requesting a [employer] account. Our records indicate that you already have an active [employer] account. You can access your account anytime at [email or web address]. [email or web address] Did you know that you can access your hospital and ER discharge instructions at any time in [employer]? You can also review all of your test results from your hospital stay or ER visit. Additional Information If you have questions, please visit the Frequently Asked Questions section of the [employer] website at [email or web address] Remember, [employer] is NOT to be used for urgent needs. For medical emergencies, dial 911. Now available from your iPhone and Android! General Information Please provide this summary of care documentation to your next provider. Patient Signature:  ____________________________________________________________ Date:  ____________________________________________________________  
  
Xochit[employer] Charter Provider Signature:  ____________________________________________________________ Date:  ____________________________________________________________

## 2017-02-09 NOTE — H&P
HISTORY OF PRESENT ILLNESS  Dasha Garza is a 51 y.o. female. HPI  ESTABLISHED patient here for follow-up of RIGHT breast cancer. She is starting to feel much better. Had terrible fatigue with the XRT as well as \"nerve issues\" and had to stop. Did complete all but eight radiation treatments and last treatment was 1/10/16. Was so fatigued the next day that she had to stay in bed all day. Is having no problems with her breast at this point. Would like to have her port removed.      01/29/2016: RIGHT core bx of IMC with [facility]. ER+100%/[place]+90% Her2- ([facility]: ER+80%/[place]+20%)  03/31/2016: RIGHT lumpectomy with SNBx of 1.9 cm, gr2 ILC with DCIS and LCIS. 2/2 nodes involved. Clear margins.  pT1c pN1a Mx  05/17/2016: RIGHT ALND. 0/10 nodes involved  08/18/2016: completed adjuvant chemo (TC x4)  09/23/2016: started XRT, completed all but eight treatments with last treatment on 1/10/17           Past Medical History   Diagnosis Date    Arthritis         HANDS AND KNEES    Asthma      Cancer ([facility] [address] 2016       RIGHT breast cancer    Depression      GERD (gastroesophageal reflux disease)      Heart murmur      Hypertension      PUD (peptic ulcer disease)      Vertigo                  Past Surgical History   Procedure Laterality Date    Hx cholecystectomy        Hx gyn           cesarian x 3    Hx hysterectomy           Still has ovaries    Hx gi           COLONOSCOPY    Hx urological           CYSTOSCOPY    Hx urological           BLADDER SLING    Hx orthopaedic Left         ARM FX WITH PINS    Hx tonsillectomy        Hx breast biopsy Right         SEVERAL TIMES    Hx breast lumpectomy Right 3/31/2016       RIGHT BREAST LUMPECTOMY, RIGHT SENTINEL NODE BIOPSY WITH ULTRASOUND performed by Cassie Mendez MD at [address] breast surgery procedure unlisted Right 5/17/16       RIGHT axillary lymph node dissection and PAC insertion         Social History            Social History    Marital status:        Spouse name: N/A    Number of children: N/A    Years of education: N/A          Occupational History    Not on file.              Social History Main Topics    Smoking status: Never Smoker    Smokeless tobacco: Never Used    Alcohol use 0.0 oz/week        0 Standard drinks or equivalent per week          Comment: occasionally a glass of wine    Drug use: No    Sexual activity: Yes       Partners: Male       Birth control/ protection: None           Other Topics Concern    Not on file          Social History Narrative     Recently moved from [place], they have been here since April.      .                 Current Outpatient Prescriptions on File Prior to Visit   Medication Sig Dispense Refill    diclofenac EC (VOLTAREN) 25 mg EC tablet TK 1 T PO QID   0    cyanocobalamin 1,000 mcg tablet Take 1,000 mcg by mouth daily.        gabapentin (NEURONTIN) 100 mg capsule Take 1 Cap by mouth three (3) times daily. 90 Cap 1    zolpidem (AMBIEN) 10 mg tablet 20 mg.   2    VENTOLIN HFA 90 mcg/actuation inhaler          citalopram (CELEXA) 40 mg tablet Take 40 mg by mouth nightly.        losartan (COZAAR) 25 mg tablet Take 1 Tab by mouth daily. 90 Tab 1      No current facility-administered medications on file prior to visit.                Allergies   Allergen Reactions    Adhesive Tape-Silicones Rash and Itching    Darvocet A500 [Propoxyphene N-Acetaminophen] Nausea Only and Other (comments)       headache    Diazepam Other (comments)       Not an allergy, just does not work for her at all.  Hydrocodone Nausea and Vomiting and Vertigo    Percocet [Oxycodone-Acetaminophen] Nausea Only and Other (comments)       headache    Tylenol-Codeine #2 Itching, Nausea Only and Other (comments)       HEADACHES         OB History     Obstetric Comments     Menarche: 8. LMP: 38. # of Children: 3. Age at Delivery of First Child: 20. Hysterectomy/oophorectomy: YES/NO. Breast Bx: Yes,.  Hx of Breast Feeding: Yes. BCP: Yes, in the past. Hormone therapy: Yes.              ROS  Constitutional: Negative   HENT: Negative. Eyes: Negative. Respiratory: Negative. Cardiovascular: Negative. Gastrointestinal: Negative. Genitourinary: Negative. Musculoskeletal: Negative. Skin: Negative. Neurological: Negative. Endo/Heme/Allergies: Negative. Psychiatric/Behavioral: Negative.     Physical Exam   Cardiovascular: Normal rate and normal heart sounds. Pulmonary/Chest: Breath sounds normal. Right breast exhibits no inverted nipple, no mass, no nipple discharge, no skin change and no tenderness. Left breast exhibits no inverted nipple, no mass, no nipple discharge, no skin change and no tenderness. Breasts are symmetrical.       Lymphadenopathy:   Right cervical: No superficial cervical, no deep cervical and no posterior cervical adenopathy present. Left cervical: No superficial cervical, no deep cervical and no posterior cervical adenopathy present. Right axillary: No pectoral and no lateral adenopathy present. Left axillary: No pectoral and no lateral adenopathy present.        ASSESSMENT and PLAN      ICD-10-CM ICD-9-CM     1.  Malignant neoplasm of upper-outer quadrant of right female breast ([facility] [address]        Admit for portacath removal

## 2017-02-09 NOTE — MR AVS SNAPSHOT
Visit Information Date & Time Provider Department Dept. Phone Encounter #  
 2/9/2017  9:00 AM Rahel Davies, [address]  Oncology at [facility] [phone] Follow-up Instructions Return in about 3 months (around 5/9/2017). Routing History Follow-up and Disposition History Your Appointments 2/9/2017 10:45 AM  
SURGERY with Jose Greer MD  
[facility] [place] [address]) Appt Note: [facility] - PORTACATH Removal  
 [address] [facility] [address]  
  
    
 7/11/2017  1:30 PM  
Follow Up with Erica Dang, [place] [address] at [place] [address] [facility]) Appt Note: LEFT breast 6 month follow up Cp$0 1/13/17; LEFT breast 6 month follow up Cp$0 1/13/17 [address]  
  
   
 [address] Upcoming Health Maintenance Date Due Pneumococcal 19-64 Highest Risk (1 of 3 - PCV13) 10/21/1988 DTaP/Tdap/Td series (1 - Tdap) 10/21/1990 PAP AKA CERVICAL CYTOLOGY 10/21/1990 INFLUENZA AGE 9 TO ADULT 8/1/2016 Allergies as of 2/9/2017  Review Complete On: 2/9/2017 By: Rahel Davies DO Severity Noted Reaction Type Reactions Adhesive Tape-silicones  94/64/0686    Rash, Itching Darvocet A500 [Propoxyphene N-acetaminophen]  01/29/2016    Nausea Only, Other (comments)  
 headache Diazepam  05/04/2016    Other (comments) Not an allergy, just does not work for her at all. Hydrocodone  04/25/2016    Nausea and Vomiting, Vertigo Percocet [Oxycodone-acetaminophen]  01/29/2016    Nausea Only, Other (comments)  
 headache Tylenol-codeine #2  03/24/2016    Itching, Nausea Only, Other (comments) HEADACHES Current Immunizations  Reviewed on 2/9/2017 No immunizations on file. Reviewed by Zak Ledesma LPN on 2/3/0490 at  6:15 AM  
You Were Diagnosed With   
  
 Codes Comments Malignant neoplasm of right breast, stage 2 ([facility] [address]    -  Primary ICD-10-CM: C50.911 ICD-9-CM: 174.9 Breast cancer metastasized to axillary lymph node, right ([facility] [address]     ICD-10-CM: C50.911, C77.3 ICD-9-CM: 174.9, 196.3 S/P lumpectomy, right breast     ICD-10-CM: Z90.11 ICD-9-CM: V45.89 Port catheter in place     ICD-10-CM: O41.510 ICD-9-CM: V45.89 Vitals BP Pulse Temp Resp Height(growth percentile) Weight(growth percentile) (!) 124/92 90 98.1 °F (36.7 °C) (Oral) 16 5' 3\" (1.6 m) 225 lb 12.8 oz (102.4 kg) LMP SpO2 BMI OB Status Smoking Status 07/04/2014 (Approximate) 98% 40 kg/m2 Hysterectomy Never Smoker Vitals History BMI and BSA Data Body Mass Index Body Surface Area  
 40 kg/m 2 2.13 m 2 Preferred Pharmacy Pharmacy Name Phone [facility] [address] [place] [address] Your Updated Medication List  
  
   
This list is accurate as of: 2/9/17 10:25 AM.  Always use your most recent med list.  
  
  
  
  
 amoxicillin-clavulanate 875-125 mg per tablet Commonly known as:  AUGMENTIN  
TK 1 T PO BID  
  
 anastrozole 1 mg tablet Commonly known as:  ARIMIDEX Take 1 Tab by mouth daily. Indications: HORMONE RECEPTOR POSITIVE BREAST CANCER * citalopram 40 mg tablet Commonly known as:  Stevo Flatter Take 40 mg by mouth nightly. * citalopram 20 mg tablet Commonly known as:  Stevo Flatter TK 1 T PO QD  
  
 cyanocobalamin 1,000 mcg tablet Take 1,000 mcg by mouth daily. diclofenac EC 25 mg EC tablet Commonly known as:  VOLTAREN  
TK 1 T PO  QID  
  
 * gabapentin 100 mg capsule Commonly known as:  NEURONTIN Take 1 Cap by mouth three (3) times daily. * gabapentin 300 mg capsule Commonly known as:  NEURONTIN  
TK 1 C PO BID  
  
 losartan 25 mg tablet Commonly known as:  COZAAR  
 Take 1 Tab by mouth daily. meloxicam 15 mg tablet Commonly known as:  MOBIC TK 1 T PO QD  
  
 metFORMIN 500 mg tablet Commonly known as:  GLUCOPHAGE  
TK 1 T PO D WITH DINNER  
  
 pantoprazole 40 mg tablet Commonly known as:  PROTONIX TK 1 T PO QD  
  
 VENTOLIN HFA 90 mcg/actuation inhaler Generic drug:  albuterol * zolpidem 10 mg tablet Commonly known as:  AMBIEN  
20 mg.  
  
 * zolpidem CR 12.5 mg tablet Commonly known as:  AMBIEN CR  
TK 1 T PO QD HS PRN  
  
 * Notice: This list has 6 medication(s) that are the same as other medications prescribed for you. Read the directions carefully, and ask your doctor or other care provider to review them with you. Prescriptions Sent to Pharmacy Refills  
 anastrozole (ARIMIDEX) 1 mg tablet 3 Sig: Take 1 Tab by mouth daily. Indications: HORMONE RECEPTOR POSITIVE BREAST CANCER Class: Normal  
 Pharmacy: [address] [place] [address],[address], [facility] [place] [facility] #: [phone] Route: Oral  
  
We Performed the Following REFERRAL TO ONCOLOGY NURSE NAVIGATOR [[id number] Custom] Comments:  
 Please evaluate patient for support breast cancer/ survivorship Follow-up Instructions Return in about 3 months (around 5/9/2017). To-Do List   
 07/11/2017 12:30 PM  
  Appointment with [facility] KAREN 5 at [address] [place] [address] ([phone]) Shower or bathe using soap and water. Do not use deodorant, powder, perfumes, or lotion the day of your exam.  If your prior mammograms were not performed at [facility] 6 please bring films with you or forward prior images 2 days before your procedure. If patient is not a callback diagnostic, the patient must have an order/script from the physician for the diagnostic. Please bring it on the day of the mammogram or have it faxed to the department.   [facility]  [phone] [facility] [place]  [phone] [phone] [facility] [phone] [address] [place] [address] [phone] [facility] [phone] Referral Information Referral ID Referred By Referred To  
  
 [id number] Lisandro Carver Not Available Visits Status Start Date End Date 1 New Request 2/9/17 2/9/18 If your referral has a status of pending review or denied, additional information will be sent to support the outcome of this decision. Patient Instructions Anastrozole (Arimidex®) This information is about a hormonal therapy used to treat breast cancer called anastrozole, which is also called Arimidex®. Throughout this page we refer to it by its more commonly used name, Arimidex. This information should ideally be read with our general information about breast cancer or secondary breast cancer. Arimidex Arimidex is a type of hormonal therapy used to treat breast cancer in women who have been through the menopause (change of life). You will see your doctor regularly while you have this treatment so they can monitor its effects. Hormonal therapies interfere with the production or action of particular hormones in the body. Hormones are substances produced naturally in the body. They act as chemical messengers and help control the activity of cells and organs. Aromatase inhibitors Many breast cancers rely on the hormone oestrogen to grow. These cancers are known as hormone-sensitive breast cancers. In women who have had their menopause, the main source of oestrogen is through the conversion of androgens (sex hormones produced by the adrenal glands) into oestrogens. This is carried out by an enzyme called aromatase. The conversion process is known as aromatisation, and it happens mainly in the fatty tissues of the body. Arimidex is a drug that blocks the process of aromatisation, and so reduces the amount of oestrogen in the body. As less oestrogen reaches the cancer cells, they grow more slowly or stop growing altogether. Drugs that work in this way are known as aromatase inhibitors.  Other aromatase inhibitors include letrozole (Femara®) and exemestane (Aromasin®). How Arimidex is taken Arimidex is a tablet that is taken once a day. It should be swallowed whole with a glass of water, at about the same time each day. It doesn't matter whether this is in the morning or evening. When it is given Your doctor will take into account a number of different factors when planning your treatment. Arimidex is used to treat post-menopausal women with hormone-sensitive breast cancer. Early breast cancer Arimidex may be given to women with early breast cancer (cancer that has not spread) after they have had surgery to remove the cancer. Giving treatment after surgery to reduce the chance of the cancer coming back is known as adjuvant therapy. For some women, Arimidex may be more effective than tamoxifen, and it has different side effects. Studies show that switching to Arimidex after taking tamoxifen for 2-3 years may be better than five years of tamoxifen for some women. Advanced breast cancer Arimidex can be used to treat women who have breast cancer that has spread to other parts of the body (advanced or secondary breast cancer). It can also be used to treat women whose breast cancer has come back after initial treatment. You might find our information about staging and grading of breast cancer useful. Length of treatment Your doctors will discuss the length of treatment they feel is appropriate for you. Arimidex may be given over a number of years, or for as long as it is controlling your cancer, depending on your individual situation. Possible side effects Each person's reaction to any medicine is different. Most people have very few side effects with Arimidex, while others may experience more. The side effects described here won't affect everyone and may be different if you are taking more than one drug.   
We have outlined the most common side effects, but haven't included those that are rare and therefore unlikely to affect you. If you notice any effects which aren't listed here, discuss them with your doctor or nurse. You may have some of the following side effects, to varying degrees:  
Hot flushes and sweats These are usually mild and may wear off after a period of time. Sometimes people find it helps to cut down on tea, coffee, nicotine and alcohol. Research suggests that hormones called progestogens or some types of antidepressants may be helpful in controlling this side effect. Your doctor or nurse can discuss this with you. Some people find complementary therapies such as acupuncture helpful. Your GP may be able to give you details about having these on the NHS. If you find your own therapist, make sure that they are properly qualified and registered. You can read more about treatments for menopausal symptoms like hot flushes in our information about managing menopausal symptoms. Vaginal dryness This may occur while using Arimidex. Gels that can help to overcome the dryness are available. You can buy these from a  or your doctor can prescribe them. Feeling sick (nausea) and being sick (vomiting) These side effects are rare and usually mild. They can usually be effectively treated, so let your doctor know if you are affected. Feeling sick can often be relieved by taking your tablet with food or at night. Diarrhoea If you have diarrhoea it's important to drink plenty of fluids. Hair thinning Some women notice that their hair becomes thinner while taking Arimidex. This is usually mild and the hair grows back at the end of treatment. Headaches Some people have headaches while taking Arimidex, but this is not common. It's important to drink plenty of fluids. Let your doctor know if you are getting headaches, as they can prescribe medication to help. Skin rashes Rarely, Arimidex can cause skin rashes. Vaginal bleeding Some women have some vaginal bleeding, usually in the first few weeks of treatment. This is rare and usually occurs after changing from other hormonal therapies to treatment with Arimidex. If the bleeding continues, tell your doctor or breast care nurse. Joint pains/muscular stiffness Some women have pain and stiffness in their joints while taking Arimidex. Let your doctor know if these effects are a problem. You may find it helpful to take mild painkillers. Tiredness (fatigue) and lethargy Some people can have increased tiredness, especially at the start of treatment. It's important to get plenty of rest. If you are very sleepy you should not drive or operate machinery. Risk of osteoporosis Women who have, or are at risk of, osteoporosis (weakened bones), should have their bone strength assessed before and during treatment with Arimidex. Some women may need to take bone-strengthening drugs to help prevent osteoporosis developing. Always let your doctor or nurse know about any side effects you have. There are usually ways in which they can be controlled or improved. Things to remember about Arimidex tablets ? Arimidex may interact with other medicines. Tell your doctor about any medicines you are taking, including non-prescribed drugs such as complementary therapies, vitamins and herbal drugs. ? Keep the tablets in a safe place, out of the reach of children. ? Keep the tablets in the original packaging and store them at room temperature, away from heat and direct sunlight. ? Don't worry if you forget to take your tablet. Do not take a double dose. The levels of the drug in your blood will not change very much, but try not to miss more than one or two tablets in a row.  
? If your doctor decides to stop the treatment, return any remaining tablets to the pharmacist. Don't flush them down the toilet or throw them away. ? Remember to get a new prescription a few weeks before you run out of tablets, and make sure that you have plenty for holidays. References This information is based on our Anastrozole (Arimidex®) fact sheet and has been compiled using information from a number of reliable sources, including: ? eric Nugent. Didi Guan: The Complete Drug Reference. 36th edition. 2009. [employer]. ? [employer]. 59th edition. 2010. [employer] and [address] [place] [address]. ? Early and locally advanced breast cancer: diagnosis and treatment. February 2009. National institute for Health and Clinical Excellence (NICE). ? electronic Medicines Compendium [facility]). www.medicines. org.uk (accessed September 2010). Introducing [facility]! Dear Max Rodgers: Thank you for requesting a [employer] account. Our records indicate that you already have an active [employer] account. You can access your account anytime at [email or web address] Did you know that you can access your hospital and ER discharge instructions at any time in [employer]? You can also review all of your test results from your hospital stay or ER visit. Additional Information If you have questions, please visit the Frequently Asked Questions section of the [employer] website at [email or web address] Remember, [employer] is NOT to be used for urgent needs. For medical emergencies, dial 911. Now available from your iPhone and Android! Please provide this summary of care documentation to your next provider. Your primary care clinician is listed as Antonio Bazan. If you have any questions after today's visit, please call [phone].

## 2017-02-09 NOTE — ROUTINE PROCESS
Patient: Leah Posada MRN: [id number]  SSN: [id number]   YOB: 1970  Age: 51 y.o. Sex: female     Patient is status post Procedure(s):  PORTACATH REMOVAL.     Surgeon(s) and Role:     * Analilia Banks MD - Primary    Local/Dose/Irrigation:  [facility]                Venous Access Device (Active)            Drain 19fr bard drain 05/17/16 Right Other (comment) (Active)                     Dressing/Packing:  Wound Chest Left-DRESSING TYPE: Topical skin adhesive/glue (02/09/17 1256)  Splint/Cast:  ]    Other:

## 2017-02-09 NOTE — DISCHARGE INSTRUCTIONS
May shower, no heavy lifting today, ice as needed. DISCHARGE SUMMARY from Nurse    The following personal items are in your possession at time of discharge:    Dental Appliances: None              Clothing:  (clothes and purse locked in locker, ok per pt)                PATIENT INSTRUCTIONS:    After general anesthesia or intravenous sedation, for 24 hours or while taking prescription Narcotics:  · Limit your activities  · Do not drive and operate hazardous machinery  · Do not make important personal or business decisions  · Do  not drink alcoholic beverages  · If you have not urinated within 8 hours after discharge, please contact your surgeon on call. Report the following to your surgeon:  · Excessive pain, swelling, redness or odor of or around the surgical area  · Temperature over 100.5  · Nausea and vomiting lasting longer than 4 hours or if unable to take medications  · Any signs of decreased circulation or nerve impairment to extremity: change in color, persistent  numbness, tingling, coldness or increase pain  · Any questions        What to do at Home:  Recommended activity: Activity as tolerated and No driving while on analgesics,     If you experience any of the following symptoms ; as noted above, please follow up with Bianca Clemons in 3 months. *  Please give a list of your current medications to your Primary Care Provider. *  Please update this list whenever your medications are discontinued, doses are      changed, or new medications (including over-the-counter products) are added. *  Please carry medication information at all times in case of emergency situations. These are general instructions for a healthy lifestyle:    No smoking/ No tobacco products/ Avoid exposure to second hand smoke    Surgeon General's Warning:  Quitting smoking now greatly reduces serious risk to your health.     Obesity, smoking, and sedentary lifestyle greatly increases your risk for illness    A healthy diet, regular physical exercise & weight monitoring are important for maintaining a healthy lifestyle    You may be retaining fluid if you have a history of heart failure or if you experience any of the following symptoms:  Weight gain of 3 pounds or more overnight or 5 pounds in a week, increased swelling in our hands or feet or shortness of breath while lying flat in bed. Please call your doctor as soon as you notice any of these symptoms; do not wait until your next office visit. Recognize signs and symptoms of STROKE:    F-face looks uneven    A-arms unable to move or move unevenly    S-speech slurred or non-existent    T-time-call 911 as soon as signs and symptoms begin-DO NOT go       Back to bed or wait to see if you get better-TIME IS BRAIN. Warning Signs of HEART ATTACK     Call 911 if you have these symptoms:   Chest discomfort. Most heart attacks involve discomfort in the center of the chest that lasts more than a few minutes, or that goes away and comes back. It can feel like uncomfortable pressure, squeezing, fullness, or pain.  Discomfort in other areas of the upper body. Symptoms can include pain or discomfort in one or both arms, the back, neck, jaw, or stomach.  Shortness of breath with or without chest discomfort.  Other signs may include breaking out in a cold sweat, nausea, or lightheadedness. Don't wait more than five minutes to call 911 - MINUTES MATTER! Fast action can save your life. Calling 911 is almost always the fastest way to get lifesaving treatment. Emergency Medical Services staff can begin treatment when they arrive -- up to an hour sooner than if someone gets to the hospital by car. The discharge information has been reviewed with the patient. The patient verbalized understanding. Discharge medications reviewed with the patient and appropriate educational materials and side effects teaching were provided.

## 2017-02-09 NOTE — PROGRESS NOTES
HEME/ONC PROGRESS NOTE       Fabrizio Mcintosh is a 51 y.o. 1970 female and presents with Breast Cancer    CC  Breast cancer 1/16    HPI: Pt is here for f/u breast cancer done radiation. Stopped radiation early 1/16 due to side effects. Pt has not been here since 10/16 per her preference. Pt did not show for PF appts and f/u here. Finished adjuvant chemotherapy with TC 8/16   Pt is here today to discuss next step in treatment/ hormonal therapy. Postmenopausal with hx of ERT in past.   Willing to take adjuvant AI. Pt still has ? Neuropathy in hands/ pain in hands. Is seeing neurology and rheumatology. States she has lymphedema. Pt states overall treatment has been overwhelming for her. Has had to work (as a ) and types all day. DX   Encounter Diagnoses   Name Primary?  Malignant neoplasm of right breast, stage 2 ([address] Yes    Breast cancer metastasized to axillary lymph node, right ([address]     S/P lumpectomy, right breast     Port catheter in place         STAGE:  2 ER+ HEr2 neg  mammoprint low risk     TREATMENT COURSE: right lumpectomy/  Axillary dissection. TC chemo completed 8/2016. radiation done 1/16.      Past Medical History   Diagnosis Date    Arthritis      HANDS AND KNEES    Asthma     Cancer ([address] 2016     RIGHT breast cancer    Depression     GERD (gastroesophageal reflux disease)     Heart murmur     Hypertension     PUD (peptic ulcer disease)     Vertigo      Past Surgical History   Procedure Laterality Date    Hx cholecystectomy      Hx gyn       cesarian x 3    Hx hysterectomy       Still has ovaries    Hx gi       COLONOSCOPY    Hx urological       CYSTOSCOPY    Hx urological       BLADDER SLING    Hx orthopaedic Left      ARM FX WITH PINS    Hx tonsillectomy      Hx breast biopsy Right      SEVERAL TIMES    Hx breast lumpectomy Right 3/31/2016     RIGHT BREAST LUMPECTOMY, RIGHT SENTINEL NODE BIOPSY WITH ULTRASOUND performed by Pamela Mora MD at [address] breast surgery procedure unlisted Right 5/17/16     RIGHT axillary lymph node dissection and PAC insertion     Social History     Social History    Marital status:      Spouse name: N/A    Number of children: N/A    Years of education: N/A     Social History Main Topics    Smoking status: Never Smoker    Smokeless tobacco: Never Used    Alcohol use 0.0 oz/week     0 Standard drinks or equivalent per week      Comment: occasionally a glass of wine    Drug use: No    Sexual activity: Yes     Partners: Male     Birth control/ protection: None     Other Topics Concern    None     Social History Narrative    Recently moved from [place], they have been here since April. . Family History   Problem Relation Age of Onset    Hypertension Mother     Kidney Disease Mother      ON DIALYSIS    Heart Disease Mother     No Known Problems Father     Asthma Son     Asthma Daughter     No Known Problems Son     Anesth Problems Neg Hx        Current Outpatient Prescriptions   Medication Sig Dispense Refill    gabapentin (NEURONTIN) 300 mg capsule TK 1 C PO BID  1    metFORMIN (GLUCOPHAGE) 500 mg tablet TK 1 T PO D WITH DINNER  3    zolpidem CR (AMBIEN CR) 12.5 mg tablet TK 1 T PO QD HS PRN  2    anastrozole (ARIMIDEX) 1 mg tablet Take 1 Tab by mouth daily. Indications: HORMONE RECEPTOR POSITIVE BREAST CANCER 90 Tab 3    diclofenac EC (VOLTAREN) 25 mg EC tablet TK 1 T PO  QID  0    cyanocobalamin 1,000 mcg tablet Take 1,000 mcg by mouth daily.  VENTOLIN HFA 90 mcg/actuation inhaler       citalopram (CELEXA) 40 mg tablet Take 40 mg by mouth nightly.  losartan (COZAAR) 25 mg tablet Take 1 Tab by mouth daily.  90 Tab 1    amoxicillin-clavulanate (AUGMENTIN) 875-125 mg per tablet TK 1 T PO BID  1    citalopram (CELEXA) 20 mg tablet TK 1 T PO QD  5    meloxicam (MOBIC) 15 mg tablet TK 1 T PO QD  3    pantoprazole (PROTONIX) 40 mg tablet TK 1 T PO QD  2    gabapentin (NEURONTIN) 100 mg capsule Take 1 Cap by mouth three (3) times daily. 90 Cap 1    zolpidem (AMBIEN) 10 mg tablet 20 mg.  2       Allergies   Allergen Reactions    Adhesive Tape-Silicones Rash and Itching    Darvocet A500 [Propoxyphene N-Acetaminophen] Nausea Only and Other (comments)     headache    Diazepam Other (comments)     Not an allergy, just does not work for her at all.  Hydrocodone Nausea and Vomiting and Vertigo    Percocet [Oxycodone-Acetaminophen] Nausea Only and Other (comments)     headache    Tylenol-Codeine #2 Itching, Nausea Only and Other (comments)     HEADACHES         Review of Systems    A comprehensive review of systems was negative except for: per HPI     Objective:  Visit Vitals    BP (!) 124/92    Pulse 90    Temp 98.1 °F (36.7 °C) (Oral)    Resp 16    Ht 5' 3\" (1.6 m)    Wt 225 lb 12.8 oz (102.4 kg)    LMP 07/04/2014 (Approximate)    SpO2 98%    BMI 40 kg/m2         Physical Exam:   General appearance - alert, well appearing, and in no distress and oriented to person, place, and time  Mental status - alert, oriented to person, place, and time, normal mood, behavior, speech, dress, motor activity, and thought processes  EYE-conj clear  Neck supple, no cervical or supraclavicular adenopathy appreciated  Lungs - Clear to auscultation bilaterally  CV - Regular rate and rhythm  Abdomen - Round, soft, non-tender. Ext-no edema  Skin-Warm and dry. Neuro -alert, oriented, normal speech, no focal findings or movement disorder noted. Breast - no masses palpable b/l    Diagnostic Imaging   reviewed  Results for orders placed during the hospital encounter of 08/24/16   XR CHEST PA LAT    Narrative Indication:  fever     Exam: PA and lateral views of the chest.    Direct comparison is made to prior CXR dated June 7, 2016. Findings: Cardiomediastinal silhouette is within normal limits.  Central venous  port catheter is unchanged in position. Lungs are clear bilaterally. Pleural  spaces are normal. Osseous structures are intact. Impression IMPRESSION: No acute cardiopulmonary disease. Results for orders placed during the hospital encounter of 05/06/16   CT ABDOMEN W WO CONT AND PELVIS W CONT    Narrative **Final Report**       ICD Codes / Adm. Diagnosis: 174.9  174.9 / Malignant neoplasm of breast (    Malignant neoplasm of breast  Examination:  CT ABD W WO CON PELVIS W CON  - [id number] - May  6 2016  2:44PM  Accession No:  [id number]  Reason:        REPORT:  EXAM:  CT THORAX W CON, CT ABD W WO CON PELVIS W CON    INDICATION:   breast cancer staging    COMPARISON: None. CONTRAST:  90 mL of [id number]     TECHNIQUE: Helical CT was performed of the abdomen without IV contrast and   with IV contrast during arterial administration. Multislice helical CT was performed from the thoracic inlet to the symphysis   pubis during uneventful rapid bolus intravenous contrast administration. Contiguous 5 mm axial images were reconstructed and lung and soft tissue   windows were generated. Coronal and sagittal reformations were generated. FINDINGS:  CT abdomen without demonstrates no abnormal calcifications. CT of the abdomen during arterial phase imaging demonstrates vague area of   enhancement in segment 4A most likely hepatic perfusion anomaly. No focal   lesions are identified in the spleen, pancreas, adrenal glands or kidneys. CT CHEST: Postsurgical changes are noted in the right breast. No mediastinal   or hilar adenopathy is identified. There is a tiny pericardial effusion. There is a tiny right pleural effusion    No pulmonary masses are identified. There is a 2 mm subpleural nodule right   middle lobe. CT abdomen and pelvis: No focal lesions are identified in the liver, spleen,   pancreas, kidneys or adrenal glands. No retroperitoneal adenopathy is   identified. Bowel is not obstructed.  No pelvic adenopathy is identified. Bony structures are unremarkable. IMPRESSION:  1. CT chest demonstrates no definite evidence of metastatic disease. Post   surgical changes noted right breast. There is a 2 mm subpleural nodule right   middle lobe. 2. CT abdomen and pelvis demonstrates no evidence of metastatic disease. Signing/Reading Doctor: Shyann Smith ([id number])    Approved: Shyann Smith ([id number])  May  6 2016  3:34PM                                    Lab Results  reviewed  Lab Results   Component Value Date/Time    WBC 5.1 10/27/2016 12:28 PM    HGB 11.3 10/27/2016 12:28 PM    HCT 36.1 10/27/2016 12:28 PM    PLATELET 570 80/37/7063 12:28 PM    MCV 81.7 10/27/2016 12:28 PM       Lab Results   Component Value Date/Time    Sodium 142 10/27/2016 12:28 PM    Potassium 3.8 10/27/2016 12:28 PM    Chloride 105 10/27/2016 12:28 PM    CO2 28 10/27/2016 12:28 PM    Anion gap 9 10/27/2016 12:28 PM    Glucose 93 10/27/2016 12:28 PM    BUN 10 10/27/2016 12:28 PM    Creatinine 0.71 10/27/2016 12:28 PM    BUN/Creatinine ratio 14 10/27/2016 12:28 PM    GFR est AA >60 10/27/2016 12:28 PM    GFR est non-AA >60 10/27/2016 12:28 PM    Calcium 9.6 10/27/2016 12:28 PM    AST (SGOT) 30 10/27/2016 12:28 PM    Alk. phosphatase 99 10/27/2016 12:28 PM    Protein, total 7.1 10/28/2016 02:48 PM    Albumin 3.5 10/27/2016 12:28 PM    Globulin 4.1 10/27/2016 12:28 PM    A-G Ratio 0.9 10/27/2016 12:28 PM    ALT (SGPT) 46 10/27/2016 12:28 PM     Assessment/Plan:     Jeniffer Mcintyre is a 51 y.o. female and presents with Breast Cancer    CC  Breast cancer 1/16    DX   Encounter Diagnoses   Name Primary?  Malignant neoplasm of right breast, stage 2 ([address] Yes    Lobular breast cancer, right ([address]         STAGE:  2 ER+ HEr2 neg  mammoprint low risk     TREATMENT COURSE: right lumpectomy/ axillary LN dissection. TC chemo then radiation    1.   Stage 2 breast cancer ER+ HER2 neg mammoprint low risk luminal A s/p lumpectomy and axillary LN dissection which showed 0/10 LN +. Pt has not been here since 10/16 per her preference. Has been no show for PF visits. NERD. She completed adjuvant chemotherapy with TC x 4 on 8/18 and did well with chemo overall.      stopped radiation early 1/17. Has been seen by surgery and to have port removal today. needs hormonal therapy with AI. Reviewed risks, benefits and alternatives of arimadex today. Pt is agreeable to arimadex and will start. Labs recently done by neurology. Next mammo set up. 2.  Postmenopausal Gyn follow-up. 3.  Arthritis in hands. Being worked up for this. 4.  Lack of libido. Reviewed this concern today regarding increasing intimacy with . Pt is going on vacation with  soon. Good start. Went to gyn yesterday which will help. 5.  Works at a . Unsure if she will be able to keep working due to hand arthritis. We will stop disability if pt needs help with this. Call if questions. Follow-up in 3 months. ICD-10-CM ICD-9-CM    1. Malignant neoplasm of right breast, stage 2 ([facility]) C50.911 174.9 REFERRAL TO ONCOLOGY NURSE NAVIGATOR   2. Breast cancer metastasized to axillary lymph node, right ([facility]) C50.911 174.9 REFERRAL TO ONCOLOGY NURSE NAVIGATOR    C77.3 196.3    3. S/P lumpectomy, right breast Z90.11 V45.89 REFERRAL TO ONCOLOGY NURSE NAVIGATOR   4. Port catheter in place Z95.828 V45.89 REFERRAL TO ONCOLOGY NURSE NAVIGATOR       Current Outpatient Prescriptions   Medication Sig    gabapentin (NEURONTIN) 300 mg capsule TK 1 C PO BID    metFORMIN (GLUCOPHAGE) 500 mg tablet TK 1 T PO D WITH DINNER    zolpidem CR (AMBIEN CR) 12.5 mg tablet TK 1 T PO QD HS PRN    anastrozole (ARIMIDEX) 1 mg tablet Take 1 Tab by mouth daily. Indications: HORMONE RECEPTOR POSITIVE BREAST CANCER    diclofenac EC (VOLTAREN) 25 mg EC tablet TK 1 T PO  QID    cyanocobalamin 1,000 mcg tablet Take 1,000 mcg by mouth daily.     VENTOLIN HFA 90 mcg/actuation inhaler     citalopram (CELEXA) 40 mg tablet Take 40 mg by mouth nightly.  losartan (COZAAR) 25 mg tablet Take 1 Tab by mouth daily.  amoxicillin-clavulanate (AUGMENTIN) 875-125 mg per tablet TK 1 T PO BID    citalopram (CELEXA) 20 mg tablet TK 1 T PO QD    meloxicam (MOBIC) 15 mg tablet TK 1 T PO QD    pantoprazole (PROTONIX) 40 mg tablet TK 1 T PO QD    gabapentin (NEURONTIN) 100 mg capsule Take 1 Cap by mouth three (3) times daily.  zolpidem (AMBIEN) 10 mg tablet 20 mg. No current facility-administered medications for this visit. continue present plan, call if any problems  Patient Instructions   Anastrozole (Arimidex®)   This information is about a hormonal therapy used to treat breast cancer called anastrozole, which is also called Arimidex®. Throughout this page we refer to it by its more commonly used name, Arimidex. This information should ideally be read with our general information about breast cancer or secondary breast cancer. Arimidex   Arimidex is a type of hormonal therapy used to treat breast cancer in women who have been through the menopause (change of life). You will see your doctor regularly while you have this treatment so they can monitor its effects. Hormonal therapies interfere with the production or action of particular hormones in the body. Hormones are substances produced naturally in the body. They act as chemical messengers and help control the activity of cells and organs. Aromatase inhibitors   Many breast cancers rely on the hormone oestrogen to grow. These cancers are known as hormone-sensitive breast cancers. In women who have had their menopause, the main source of oestrogen is through the conversion of androgens (sex hormones produced by the adrenal glands) into oestrogens. This is carried out by an enzyme called aromatase.  The conversion process is known as aromatisation, and it happens mainly in the fatty tissues of the body. Arimidex is a drug that blocks the process of aromatisation, and so reduces the amount of oestrogen in the body. As less oestrogen reaches the cancer cells, they grow more slowly or stop growing altogether. Drugs that work in this way are known as aromatase inhibitors. Other aromatase inhibitors include letrozole (Femara®) and exemestane (Aromasin®). How Arimidex is taken   Arimidex is a tablet that is taken once a day. It should be swallowed whole with a glass of water, at about the same time each day. It doesn't matter whether this is in the morning or evening. When it is given   Your doctor will take into account a number of different factors when planning your treatment. Arimidex is used to treat post-menopausal women with hormone-sensitive breast cancer. Early breast cancer   Arimidex may be given to women with early breast cancer (cancer that has not spread) after they have had surgery to remove the cancer. Giving treatment after surgery to reduce the chance of the cancer coming back is known as adjuvant therapy. For some women, Arimidex may be more effective than tamoxifen, and it has different side effects. Studies show that switching to Arimidex after taking tamoxifen for 2-3 years may be better than five years of tamoxifen for some women. Advanced breast cancer   Arimidex can be used to treat women who have breast cancer that has spread to other parts of the body (advanced or secondary breast cancer). It can also be used to treat women whose breast cancer has come back after initial treatment. You might find our information about staging and grading of breast cancer useful. Length of treatment   Your doctors will discuss the length of treatment they feel is appropriate for you. Arimidex may be given over a number of years, or for as long as it is controlling your cancer, depending on your individual situation.    Possible side effects   Each person's reaction to any medicine is different. Most people have very few side effects with Arimidex, while others may experience more. The side effects described here won't affect everyone and may be different if you are taking more than one drug. We have outlined the most common side effects, but haven't included those that are rare and therefore unlikely to affect you. If you notice any effects which aren't listed here, discuss them with your doctor or nurse. You may have some of the following side effects, to varying degrees:   Hot flushes and sweats   These are usually mild and may wear off after a period of time. Sometimes people find it helps to cut down on tea, coffee, nicotine and alcohol. Research suggests that hormones called progestogens or some types of antidepressants may be helpful in controlling this side effect. Your doctor or nurse can discuss this with you. Some people find complementary therapies such as acupuncture helpful. Your GP may be able to give you details about having these on the NHS. If you find your own therapist, make sure that they are properly qualified and registered. You can read more about treatments for menopausal symptoms like hot flushes in our information about managing menopausal symptoms. Vaginal dryness   This may occur while using Arimidex. Gels that can help to overcome the dryness are available. You can buy these from a  or your doctor can prescribe them. Feeling sick (nausea) and being sick (vomiting)   These side effects are rare and usually mild. They can usually be effectively treated, so let your doctor know if you are affected. Feeling sick can often be relieved by taking your tablet with food or at night. Diarrhoea   If you have diarrhoea it's important to drink plenty of fluids. Hair thinning   Some women notice that their hair becomes thinner while taking Arimidex. This is usually mild and the hair grows back at the end of treatment.    Headaches   Some people have headaches while taking Arimidex, but this is not common. It's important to drink plenty of fluids. Let your doctor know if you are getting headaches, as they can prescribe medication to help. Skin rashes   Rarely, Arimidex can cause skin rashes. Vaginal bleeding   Some women have some vaginal bleeding, usually in the first few weeks of treatment. This is rare and usually occurs after changing from other hormonal therapies to treatment with Arimidex. If the bleeding continues, tell your doctor or breast care nurse. Joint pains/muscular stiffness   Some women have pain and stiffness in their joints while taking Arimidex. Let your doctor know if these effects are a problem. You may find it helpful to take mild painkillers. Tiredness (fatigue) and lethargy   Some people can have increased tiredness, especially at the start of treatment. It's important to get plenty of rest. If you are very sleepy you should not drive or operate machinery. Risk of osteoporosis   Women who have, or are at risk of, osteoporosis (weakened bones), should have their bone strength assessed before and during treatment with Arimidex. Some women may need to take bone-strengthening drugs to help prevent osteoporosis developing. Always let your doctor or nurse know about any side effects you have. There are usually ways in which they can be controlled or improved. Things to remember about Arimidex tablets    Arimidex may interact with other medicines. Tell your doctor about any medicines you are taking, including non-prescribed drugs such as complementary therapies, vitamins and herbal drugs.  Keep the tablets in a safe place, out of the reach of children.  Keep the tablets in the original packaging and store them at room temperature, away from heat and direct sunlight.  Don't worry if you forget to take your tablet. Do not take a double dose.  The levels of the drug in your blood will not change very much, but try not to miss more than one or two tablets in a row.  If your doctor decides to stop the treatment, return any remaining tablets to the pharmacist. Don't flush them down the toilet or throw them away.  Remember to get a new prescription a few weeks before you run out of tablets, and make sure that you have plenty for holidays. References   This information is based on our Anastrozole (Arimidex®) fact sheet and has been compiled using information from a number of reliable sources, including:    trish Nugent al. Marian Rao: The Complete Drug Reference. 36th edition. 2009. [employer]. [facility] [employer]. 59th edition. 2010. [employer] and [address] [place] [address].  Early and locally advanced breast cancer: diagnosis and treatment. February 2009. National institute for Health and Clinical Excellence (NICE).  electronic Medicines [employer]). www.medicines. org.uk (accessed September 2010). Follow-up Disposition:  Return in about 3 months (around 5/9/2017).     Gasper Santoyo,

## 2017-02-09 NOTE — PATIENT INSTRUCTIONS
Anastrozole (Arimidex®)   This information is about a hormonal therapy used to treat breast cancer called anastrozole, which is also called Arimidex®. Throughout this page we refer to it by its more commonly used name, Arimidex. This information should ideally be read with our general information about breast cancer or secondary breast cancer. Arimidex   Arimidex is a type of hormonal therapy used to treat breast cancer in women who have been through the menopause (change of life). You will see your doctor regularly while you have this treatment so they can monitor its effects. Hormonal therapies interfere with the production or action of particular hormones in the body. Hormones are substances produced naturally in the body. They act as chemical messengers and help control the activity of cells and organs. Aromatase inhibitors   Many breast cancers rely on the hormone oestrogen to grow. These cancers are known as hormone-sensitive breast cancers. In women who have had their menopause, the main source of oestrogen is through the conversion of androgens (sex hormones produced by the adrenal glands) into oestrogens. This is carried out by an enzyme called aromatase. The conversion process is known as aromatisation, and it happens mainly in the fatty tissues of the body. Arimidex is a drug that blocks the process of aromatisation, and so reduces the amount of oestrogen in the body. As less oestrogen reaches the cancer cells, they grow more slowly or stop growing altogether. Drugs that work in this way are known as aromatase inhibitors. Other aromatase inhibitors include letrozole (Femara®) and exemestane (Aromasin®). How Arimidex is taken   Arimidex is a tablet that is taken once a day. It should be swallowed whole with a glass of water, at about the same time each day. It doesn't matter whether this is in the morning or evening.    When it is given   Your doctor will take into account a number of different factors when planning your treatment. Arimidex is used to treat post-menopausal women with hormone-sensitive breast cancer. Early breast cancer   Arimidex may be given to women with early breast cancer (cancer that has not spread) after they have had surgery to remove the cancer. Giving treatment after surgery to reduce the chance of the cancer coming back is known as adjuvant therapy. For some women, Arimidex may be more effective than tamoxifen, and it has different side effects. Studies show that switching to Arimidex after taking tamoxifen for 2-3 years may be better than five years of tamoxifen for some women. Advanced breast cancer   Arimidex can be used to treat women who have breast cancer that has spread to other parts of the body (advanced or secondary breast cancer). It can also be used to treat women whose breast cancer has come back after initial treatment. You might find our information about staging and grading of breast cancer useful. Length of treatment   Your doctors will discuss the length of treatment they feel is appropriate for you. Arimidex may be given over a number of years, or for as long as it is controlling your cancer, depending on your individual situation. Possible side effects   Each person's reaction to any medicine is different. Most people have very few side effects with Arimidex, while others may experience more. The side effects described here won't affect everyone and may be different if you are taking more than one drug. We have outlined the most common side effects, but haven't included those that are rare and therefore unlikely to affect you. If you notice any effects which aren't listed here, discuss them with your doctor or nurse. You may have some of the following side effects, to varying degrees:   Hot flushes and sweats   These are usually mild and may wear off after a period of time.  Sometimes people find it helps to cut down on tea, coffee, nicotine and alcohol. Research suggests that hormones called progestogens or some types of antidepressants may be helpful in controlling this side effect. Your doctor or nurse can discuss this with you. Some people find complementary therapies such as acupuncture helpful. Your GP may be able to give you details about having these on the NHS. If you find your own therapist, make sure that they are properly qualified and registered. You can read more about treatments for menopausal symptoms like hot flushes in our information about managing menopausal symptoms. Vaginal dryness   This may occur while using Arimidex. Gels that can help to overcome the dryness are available. You can buy these from a  or your doctor can prescribe them. Feeling sick (nausea) and being sick (vomiting)   These side effects are rare and usually mild. They can usually be effectively treated, so let your doctor know if you are affected. Feeling sick can often be relieved by taking your tablet with food or at night. Diarrhoea   If you have diarrhoea it's important to drink plenty of fluids. Hair thinning   Some women notice that their hair becomes thinner while taking Arimidex. This is usually mild and the hair grows back at the end of treatment. Headaches   Some people have headaches while taking Arimidex, but this is not common. It's important to drink plenty of fluids. Let your doctor know if you are getting headaches, as they can prescribe medication to help. Skin rashes   Rarely, Arimidex can cause skin rashes. Vaginal bleeding   Some women have some vaginal bleeding, usually in the first few weeks of treatment. This is rare and usually occurs after changing from other hormonal therapies to treatment with Arimidex. If the bleeding continues, tell your doctor or breast care nurse. Joint pains/muscular stiffness   Some women have pain and stiffness in their joints while taking Arimidex.  Let your doctor know if these effects are a problem. You may find it helpful to take mild painkillers. Tiredness (fatigue) and lethargy   Some people can have increased tiredness, especially at the start of treatment. It's important to get plenty of rest. If you are very sleepy you should not drive or operate machinery. Risk of osteoporosis   Women who have, or are at risk of, osteoporosis (weakened bones), should have their bone strength assessed before and during treatment with Arimidex. Some women may need to take bone-strengthening drugs to help prevent osteoporosis developing. Always let your doctor or nurse know about any side effects you have. There are usually ways in which they can be controlled or improved. Things to remember about Arimidex tablets    Arimidex may interact with other medicines. Tell your doctor about any medicines you are taking, including non-prescribed drugs such as complementary therapies, vitamins and herbal drugs.  Keep the tablets in a safe place, out of the reach of children.  Keep the tablets in the original packaging and store them at room temperature, away from heat and direct sunlight.  Don't worry if you forget to take your tablet. Do not take a double dose. The levels of the drug in your blood will not change very much, but try not to miss more than one or two tablets in a row.  If your doctor decides to stop the treatment, return any remaining tablets to the pharmacist. Don't flush them down the toilet or throw them away.  Remember to get a new prescription a few weeks before you run out of tablets, and make sure that you have plenty for holidays. References   This information is based on our Anastrozole (Arimidex®) fact sheet and has been compiled using information from a number of reliable sources, including:    eric Nugent. [place] [facility]: The Complete Drug Reference. 36th edition. 2009. [employer]. [facility] [employer]. 59th edition. 2010.  [place] [employer] and [address].  Early and locally advanced breast cancer: diagnosis and treatment. February 2009. National institute for Health and Clinical Excellence (NICE).  electronic Medicines Compendium [facility]). www.medicines. org.uk (accessed September 2010).

## 2017-02-10 NOTE — OP NOTES
[address] [facility] [place] [address], [address]   OP NOTE       Name:  Yoana Evans   MR#:  [id number]   :  1970   Account #:  [de-identified]    Surgery Date:  2017   Date of Adm:  2017       PREOPERATIVE DIAGNOSIS: History of carcinoma of the breast,   status post chemotherapy. POSTOPERATIVE DIAGNOSIS: History of carcinoma of the breast,   status post chemotherapy. PROCEDURE PERFORMED: Venous Port-A-Cath removal.    SURGEON: Luis Menchaca. Kylee Bey MD    ANESTHESIA: Local.    SPECIMENS REMOVED: None. ESTIMATED BLOOD LOSS: Minimal.    INDICATIONS: The patient is a 48-year-old female who has completed   her chemotherapy and requires Port-A-Cath removal.    DESCRIPTION OF PROCEDURE: After sterile prep and drape   with local anesthesia with 1% lidocaine mixed with 0.5% Marcaine, the   previous incision was opened. Port-A-Cath was identified and removed   and was noted to be intact. The wound was hemostatic. It was closed   with interrupted 3-0 Vicryl and running subcuticular 4-0 Monocryl on   skin. The patient tolerated the procedure well with no   immediate complications. She was taken to the recovery room in   stable condition.         Airam Dejesus.MD MAHARAJ / KRISTIN   D:  02/10/2017   08:43   T:  02/10/2017   09:25   Job #:  [id number]

## 2017-03-08 ENCOUNTER — DOCUMENTATION ONLY (OUTPATIENT)
Dept: ONCOLOGY | Age: 47
End: 2017-03-08

## 2017-03-08 NOTE — PROGRESS NOTES
Letter returned from patient re: Follow up appt. However patient moved and address updated. Seen in clinic 2/9/17.

## 2017-03-14 ENCOUNTER — TELEPHONE (OUTPATIENT)
Dept: ONCOLOGY | Age: 47
End: 2017-03-14

## 2017-03-14 NOTE — TELEPHONE ENCOUNTER
I will also send a copy to her PCP, Dr. Roney Dandy.  Described what is included in the survivorship care plan and encouraged her to call me with any questions she has once she has reviewed the information or to set up a more formal opportunity to go over this and receive additional resources--either in a clinic visit or by phone. She is agreeable to this plan.

## 2017-03-30 ENCOUNTER — DOCUMENTATION ONLY (OUTPATIENT)
Dept: ONCOLOGY | Age: 47
End: 2017-03-30

## 2017-03-30 NOTE — PROGRESS NOTES
[address], [facility] 22.    Breast Cancer Survivorship Care Plan    GENERAL INFORMATION    NAME/AGE/GENDER: Leah Posada is a 51 y.o. female who was born on 1970. PHONE: [phone]     Date: 3/30/2017    Referring Provider: Phuong Juardo and Davion    Patient Care Team:  April Calvin MD as PCP - General (Internal Medicine) [facility] [phone]  Analilia Banks MD as Surgeon (Breast Surgery) [facility] [phone]  Rose Marie Fernandez NP as Nurse Practitioner (Cancer Survivorship) [facility] [phone]  Cristin Blakely DO as Physician (Oncology) Medical Oncology at [facility] [phone]  Alexia hCao MD as Physician (Radiation Oncology) [facility] [phone]    DIAGNOSIS    Cancer type/location/histologic subtype/receptor status: RIGHT breast, 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 For Low Risk patients, there is a 10% probability of distant recurrence within 10 years.     FOLLOW-UP CARE PLAN    Cancer Surveillance or Other Recommended Related Tests        Name of test  When/How often Ordering Provider   Mammogram Once a year Dr. Chino Hoang scan (bone density study) Consider every two years, after baseline study, while on Arimidex Dr. Devante Miller     Please continue to see your primary care provider for all general health care recommended for a woman your age, including cancer screening tests. Possible late and long-term effects that someone with this type of cancer and treatment may experience:  bone effects, elevated cholesterol, lymphedema, neuropathy (nervous system changes), arthralgias / myalgias (joint / muscle aches and pains), hot flashes, vaginal dryness and acute leukemia (rarely)    Schedule of Clinical Visits        Coordinating Provider  When/How often Contact information   Primary Care Provider As needed for non-cancer health care [phone] Every 3 to 6 months for the first 3 years, every 6 to 12 months for years 4 and 5, and annually thereafter [phone]     Radiation Oncologist Rotate visits with medical oncologist [phone]     Surgeon Rotate visits with medical oncologist [phone]       CANCER TREATMENT SUMMARY     Surgery: Date and procedure/location/findings: 3/31/16 RIGHT lumpectomy and RIGHT axillary lymph node biopsy, margins clear, lymphovascular invasion not identified, 2 lymph nodes removed--both were positive for cancer  5/17/16 RIGHT axillary lymph node dissection--10 nodes removed, all were negative for cancer    Radiation: Yes End date (year): 11/2/16 to 1/10/17   Body area treated/dosage: 4500 cGy (25/28 recommended treatments) to RIGHT breast and RIGHT supraclavicular region.   The recommended boost to the RIGHT breast tumor bed of 1000 cGy was not delivered because you elected to stop radiation therapy on 1/10/17    Systemic Therapy (chemotherapy, biologics, other): Yes: 6/3/16 to 8/18/16    Regimen Drug Name End Date (Year)   TC (Q3w x 4) Docetaxel (Taxotere) 75mg/m2 2016    Cyclophosphamide (Cytoxan) 600mg/m2 2016     Persistent symptoms or side effects at completion of treatment: Yes (enter type(s)): neuropathy/pain in hands, lymphedema, has felt overwhelmed during treatment    Clinical Trial: No     Date of other cancer and/or recurrence of primary cancer and subsequent treatment: none, according to documentation in [facility] [place], your [place] [facility] medical record    FAMILIAL CANCER RISK ASSESSMENT    Family history of cancer: none, according to documentation in [facility]    Genetic/hereditary risk factor(s) or predisposing conditions: none apparent    CONTINUING TREATMENT    Need for Ongoing (Adjuvant) Treatment for Cancer: Yes, Arimidex 1 mg daily; began 2/2017                Additional treatment name Possible side effects Planned duration   Anastrozole (Arimidex) or Letrozole (Femara) or Exemestane (Aromasin)   Joint aches and pains, vaginal dryness, loss of interest in sex, bone loss or thinning, bone fractures, elevated cholesterol, hot flashes Currently recommended for at least 5 years     DOING YOUR PART AS A CANCER SURVIVOR    Many survivors feel worried or anxious that the cancer will come back after treatment. While it often does not, its important to talk with your doctor about the possibility of the cancer returning. Most breast cancer recurrences are found by patients between visits. Tell your doctor if you notice any of the following symptoms, as they may be signs of a cancer recurrence:     · New lumps in the breast  · Bone pain  · Chest pain  · Abdominal pain  · Shortness of breath or difficulty breathing  · Persistent headaches  · Persistent coughing  · Rash on breast  · Nipple discharge (liquid coming from the nipple)    Or any other symptoms should be brought to the attention of your provider:   1. Anything that represents a brand new symptom   2. Anything that represents a persistent symptom   3. Anything you are worried about that might be related to the cancer coming back    Cancer survivors may experience issues with the areas listed below.   If you have any concerns in these or other areas, please speak with your survivorship nurse practitioner or a member of your physician team to find out how to get help with them. Emotional and mental health, fatigue, weight changes, stopping smoking, physical functioning, insurance, financial advice/assistance, school/work issues, parenting, memory or concentration loss, fertility, sexual functioning, or any other survivorship concerns            General Guidelines for Health and Cancer Prevention/Risk Reduction      · Work to achieve and maintain a healthy weight  · Engage in regular physical activity including:  Aerobic exercise at least 150 minutes per week                            Strength training exercise at least 2 days per week    · Eat a diet that is high in vegetables, fruits, whole grains, legumes (beans) and low in saturated fats (animal fats)    · [place] not start using tobacco  · Limit alcohol consumption to no more than 1 drink per day for women/no more than 2 drinks per day for men    If you have any questions or need additional information/support, please discuss these recommendations with your doctor or nurse practitioner.         RESOURCES FOR THE JOURNEY    Breast Cancer Specific:  Living Beyond Breast Cancer www.lbbc.org  Breast Cancer. org NotSimilar.no. [facility] http://ww5.timothy. [address] www.[facility]. [place] [address]. org    General:  [employer] www.livestrong. [facility] www.cancer. [address] www.cancer. org  American Society of Clinical Oncology http://shah-wen.com/. net/research-and-advocacy/asco-care-and-treatment-  recommendations-patients/follow-care-breast-cancer  [facility] [employer] www.[facility]. [facility] [employer] www.canceradvocacy. [facility] Marilyn Jaramillo www.nccn. org  [employer] Jackderek Bentley www. patientadvocate. org    Prepared By: Mendel Marines FNP  [address]  [phone] or [phone]  [email or web address]    Delivered on: 3/31/17    This [address] [place] [address] is a cancer treatment summary and follow-up plan provided to you to keep with your healthcare records and to share with your healthcare providers. This summary is a brief record of major aspects of your cancer treatment, not a detailed or comprehensive record of your care.

## 2017-04-24 ENCOUNTER — TELEPHONE (OUTPATIENT)
Dept: SURGERY | Age: 47
End: 2017-04-24

## 2017-04-24 ENCOUNTER — OFFICE VISIT (OUTPATIENT)
Dept: SURGERY | Age: 47
End: 2017-04-24

## 2017-04-24 ENCOUNTER — TELEPHONE (OUTPATIENT)
Dept: ONCOLOGY | Age: 47
End: 2017-04-24

## 2017-04-24 VITALS
BODY MASS INDEX: 39.87 KG/M2 | HEIGHT: 63 IN | SYSTOLIC BLOOD PRESSURE: 149 MMHG | HEART RATE: 90 BPM | WEIGHT: 225 LBS | DIASTOLIC BLOOD PRESSURE: 96 MMHG

## 2017-04-24 DIAGNOSIS — C50.411 MALIGNANT NEOPLASM OF UPPER-OUTER QUADRANT OF RIGHT FEMALE BREAST (HCC): Primary | ICD-10-CM

## 2017-04-24 NOTE — PROGRESS NOTES
HISTORY OF PRESENT ILLNESS  Janett Padilla is a 51 y.o. female. HPI    ESTABLISHED patient here for LEFT breast pain and nipple discharge today which is whitish. The patient went to her GYN today, and when he was examining her, she noticed that her breast was very sore underneath the breast.  She then squeezed her nipple several times and was able to express some whitish discharge. She has not had any spontaneous nipple discharge. She is having mood swings and some joint pain on the anastrozole. Sees Dr. Maryann Conklin mid-May. 01/29/2016: RIGHT core bx of IMC with [facility]. ER+100%/WV+90% Her2- ([facility]: ER+80%/WV+20%)  03/31/2016: RIGHT lumpectomy with SNBx of 1.9 cm, gr2 ILC with DCIS and LCIS. 2/2 nodes involved. Clear margins. pT1c pN1a Mx.  05/17/2016: RIGHT ALND. 0/10 nodes involved. 08/18/2016: completed adjuvant chemo (TC x4).  09/23/2016-01/10/2017: completed all but 8 treatments of XRT (stopped midway and started back again).     [facility]    Physical Exam    ASSESSMENT and PLAN  {ASSESSMENT/PLAN:63516}

## 2017-04-24 NOTE — MR AVS SNAPSHOT
Visit Information Date & Time Provider Department Dept. Phone Encounter #  
 4/24/2017  2:54 PM Alla Barragan MD [facility] [phone] [id number] Your Appointments 5/15/2017  9:30 AM  
Follow Up with Jagruti Hedrick  [facility] at [facility] Appt Note: Hormone therapy follow up, 3 mo [address]  
[phone]  
  
   
 [address] [address]  
  
    
 7/11/2017  1:30 PM  
Follow Up with Lynnette Hernandez NP [address] at [employer] [facility] Appt Note: LEFT breast 6 month follow up Cp$0 1/13/17; LEFT breast 6 month follow up Cp$0 1/13/17 [address] On license of [facility] [address]  
  
   
 [place] Upcoming Health Maintenance Date Due Pneumococcal 19-64 Highest Risk (1 of 3 - PCV13) 10/21/1988 DTaP/Tdap/Td series (1 - Tdap) 10/21/1990 PAP AKA CERVICAL CYTOLOGY 10/21/1990 INFLUENZA AGE 9 TO ADULT 8/1/2016 Allergies as of 4/24/2017  Review Complete On: 2/9/2017 By: Annette Molina RN Severity Noted Reaction Type Reactions Adhesive Tape-silicones  20/88/0717    Rash, Itching Darvocet A500 [Propoxyphene N-acetaminophen]  01/29/2016    Nausea Only, Other (comments)  
 headache Diazepam  05/04/2016    Other (comments) Not an allergy, just does not work for her at all. Hydrocodone  04/25/2016    Nausea and Vomiting, Vertigo Percocet [Oxycodone-acetaminophen]  01/29/2016    Nausea Only, Other (comments)  
 headache Tylenol-codeine #2  03/24/2016    Itching, Nausea Only, Other (comments) HEADACHES Current Immunizations  Reviewed on 2/9/2017 No immunizations on file. Not reviewed this visit Vitals Height(growth percentile) Weight(growth percentile) LMP BMI OB Status Smoking Status 5' 3\" (1.6 m) 225 lb (102.1 kg) 07/04/2014 (Approximate) 39.86 kg/m2 Hysterectomy Never Smoker BMI and BSA Data Body Mass Index Body Surface Area  
 39.86 kg/m 2 2.13 m 2 Preferred Pharmacy Pharmacy Name [facility] [address] [address] Your Updated Medication List  
  
   
This list is accurate as of: 4/24/17  4:34 PM.  Always use your most recent med list.  
  
  
  
  
 anastrozole 1 mg tablet Commonly known as:  ARIMIDEX Take 1 Tab by mouth daily. Indications: HORMONE RECEPTOR POSITIVE BREAST CANCER * citalopram 40 mg tablet Commonly known as:  Nevelyn Poke Take 40 mg by mouth nightly. * citalopram 20 mg tablet Commonly known as:  Nevelyn Poke TK 1 T PO QD  
  
 cyanocobalamin 1,000 mcg tablet Take 1,000 mcg by mouth daily. diclofenac EC 25 mg EC tablet Commonly known as:  VOLTAREN  
TK 1 T PO  QID  
  
 * gabapentin 100 mg capsule Commonly known as:  NEURONTIN Take 1 Cap by mouth three (3) times daily. * gabapentin 300 mg capsule Commonly known as:  NEURONTIN  
TK 1 C PO BID HYDROmorphone 2 mg tablet Commonly known as:  DILAUDID Take 1 Tab by mouth every four (4) hours as needed for Pain. Max Daily Amount: 12 mg.  
  
 losartan 25 mg tablet Commonly known as:  COZAAR Take 1 Tab by mouth daily. meloxicam 15 mg tablet Commonly known as:  MOBIC TK 1 T PO QD  
  
 pantoprazole 40 mg tablet Commonly known as:  PROTONIX TK 1 T PO QD  
  
 VENTOLIN HFA 90 mcg/actuation inhaler Generic drug:  albuterol  
  
 zolpidem CR 12.5 mg tablet Commonly known as:  AMBIEN CR  
TK 1 T PO QD HS PRN  
  
 * Notice: This list has 4 medication(s) that are the same as other medications prescribed for you. Read the directions carefully, and ask your doctor or other care provider to review them with you. To-Do List   
 07/11/2017 12:30 PM  
 Appointment with [facility] KAREN 5 at [address] ([phone]) Shower or bathe using soap and water. Do not use deodorant, powder, perfumes, or lotion the day of your exam.  If your prior mammograms were not performed at [facility] please bring films with you or forward prior images 2 days before your procedure. If patient is not a callback diagnostic, the patient must have an order/script from the physician for the diagnostic. Please bring it on the day of the mammogram or have it faxed to the department. [facility]  [phone]  [phone] [facility] [phone] [facility] [address] [facility] [phone] [facility] [phone] [facility]! Dear Cherelle Gallardo: Thank you for requesting a [employer] account. Our records indicate that you already have an active [employer] account. You can access your account anytime at [email or web address] Did you know that you can access your hospital and ER discharge instructions at any time in [employer]? You can also review all of your test results from your hospital stay or ER visit. Additional Information If you have questions, please visit the Frequently Asked Questions section of the [employer] website at [email or web address] Remember, [employer] is NOT to be used for urgent needs. For medical emergencies, dial 911. Now available from your iPhone and Android! Please provide this summary of care documentation to your next provider. Your primary care clinician is listed as Juan Manuel Luke. If you have any questions after today's visit, please call [phone].

## 2017-04-24 NOTE — TELEPHONE ENCOUNTER
HIPPA verified. Patient called stating she needs to speak to the provider because she went to the GYN for an exam and when MD pressed on her left breast she had some tenderness and cloudy discharge from nipple. Patient stated she had surgery on the right breast.  Writer spoke to providers nurse and stated to patient she can make an appointment to she Dr. Nora Kelly or the surgeon. Patient then stated she has an appointment today with Dr. Broderick Jackson.

## 2017-04-24 NOTE — PROGRESS NOTES
HISTORY OF PRESENT ILLNESS  Yamil Madera is a 51 y.o. female. HPI  ESTABLISHED patient here for LEFT breast pain and nipple discharge today which is whitish. The patient went to her GYN today, and when he was examining her, she noticed that her breast was very sore underneath the breast. She then squeezed her nipple several times and was able to express some whitish discharge. She has not had any spontaneous nipple discharge.      She is having mood swings and some joint pain on the anastrozole. Sees Dr. Nayan Ibanez mid-May.      01/29/2016: RIGHT core bx of [address] with [facility]. ER+100%/[place]+90% Her2- ([facility]: ER+80%/[place]+20%)  03/31/2016: RIGHT lumpectomy with SNBx of 1.9 cm, gr2 ILC with DCIS and LCIS. 2/2 nodes involved. Clear margins. pT1c pN1a Mx.  05/17/2016: RIGHT ALND. 0/10 nodes involved. 08/18/2016: completed adjuvant chemo (TC x4).  09/23/2016-01/10/2017: completed all but 8 treatments of XRT (stopped midway and started back again).      Past Medical History:   Diagnosis Date    Arthritis     HANDS AND KNEES    Asthma     Cancer ([facility] [address] 2016    RIGHT breast cancer    Depression     GERD (gastroesophageal reflux disease)     Heart murmur     Hypertension     Neuropathy     PUD (peptic ulcer disease)     Vertigo        Past Surgical History:   Procedure Laterality Date    BREAST SURGERY PROCEDURE UNLISTED Right 5/17/16    RIGHT axillary lymph node dissection and PAC insertion    HX BREAST BIOPSY Right     SEVERAL TIMES    HX BREAST LUMPECTOMY Right 3/31/2016    RIGHT BREAST LUMPECTOMY, RIGHT SENTINEL NODE BIOPSY WITH ULTRASOUND performed by Jyothi Barrientos MD at [address] HX CHOLECYSTECTOMY      HX GI      COLONOSCOPY    HX GYN      cesarian x 3    HX HYSTERECTOMY      Still has ovaries    HX ORTHOPAEDIC Left     ARM FX WITH PINS    HX TONSILLECTOMY      HX UROLOGICAL      CYSTOSCOPY    HX UROLOGICAL      BLADDER SLING       Social History     Social History    Marital status:      Spouse name: N/A    Number of children: N/A    Years of education: N/A     Occupational History    Not on file. Social History Main Topics    Smoking status: Never Smoker    Smokeless tobacco: Never Used    Alcohol use 0.0 oz/week     0 Standard drinks or equivalent per week      Comment: occasionally a glass of wine    Drug use: No    Sexual activity: Yes     Partners: Male     Birth control/ protection: None     Other Topics Concern    Not on file     Social History Narrative    Recently moved from [place], they have been here since April. . Current Outpatient Prescriptions on File Prior to Visit   Medication Sig Dispense Refill    zolpidem CR (AMBIEN CR) 12.5 mg tablet TK 1 T PO QD HS PRN  2    anastrozole (ARIMIDEX) 1 mg tablet Take 1 Tab by mouth daily. Indications: HORMONE RECEPTOR POSITIVE BREAST CANCER 90 Tab 3    cyanocobalamin 1,000 mcg tablet Take 1,000 mcg by mouth daily.  gabapentin (NEURONTIN) 100 mg capsule Take 1 Cap by mouth three (3) times daily. 90 Cap 1    VENTOLIN HFA 90 mcg/actuation inhaler       citalopram (CELEXA) 40 mg tablet Take 40 mg by mouth nightly.  losartan (COZAAR) 25 mg tablet Take 1 Tab by mouth daily. 90 Tab 1     No current facility-administered medications on file prior to visit. Allergies   Allergen Reactions    Adhesive Tape-Silicones Rash and Itching    Darvocet A500 [Propoxyphene N-Acetaminophen] Nausea Only and Other (comments)     headache    Diazepam Other (comments)     Not an allergy, just does not work for her at all.  Hydrocodone Nausea and Vomiting and Vertigo    Percocet [Oxycodone-Acetaminophen] Nausea Only and Other (comments)     headache    Tylenol-Codeine #2 Itching, Nausea Only and Other (comments)     HEADACHES         OB History     Obstetric Comments    Menarche:  8. LMP: 38.  # of Children:  3. Age at Delivery of First Child:  20.   Hysterectomy/oophorectomy:  YES/NO.   Breast Bx: Yes,.  Hx of Breast Feeding:  Yes. BCP:  Yes, in the past. Hormone therapy:  Yes. ROS  Constitutional: Negative    HENT: Negative. Eyes: Negative. Respiratory: Negative. Cardiovascular: Negative. Gastrointestinal: Negative. Genitourinary: Negative. Musculoskeletal: Negative. Skin: Negative. Neurological: Negative. Endo/Heme/Allergies: Negative. Psychiatric/Behavioral: Negative. Physical Exam   Cardiovascular: Normal rate and normal heart sounds. Pulmonary/Chest: Breath sounds normal. Right breast exhibits no inverted nipple, no mass, no nipple discharge, no skin change and no tenderness. Left breast exhibits tenderness. Left breast exhibits no inverted nipple, no mass, no nipple discharge and no skin change. Breasts are symmetrical.       Lymphadenopathy:        Right cervical: No superficial cervical, no deep cervical and no posterior cervical adenopathy present. Left cervical: No superficial cervical, no deep cervical and no posterior cervical adenopathy present. Right axillary: No pectoral and no lateral adenopathy present. Left axillary: No pectoral and no lateral adenopathy present. BREAST ULTRASOUND  Indication: left breast pain 6:00  Technique: The area was scanned using a high-frequency linear-array near-field transducer  Findings: No abnormal mass, lesion, or shadowing noted. No cysts  Impression: Normal dense breast tissue   Disposition: Will order MRI to ensure benign findings    ASSESSMENT and PLAN    ICD-10-CM ICD-9-CM    1. Malignant neoplasm of upper-outer quadrant of right female breast ([facility] [address] C50.411 174.4      Pt presents with LT breast pain with hx of yellow nipple discharge. Normal exam. Will order MRI to ensure benign findings, and f/u once imaging results become available. This plan was reviewed with the patient and patient agrees. All questions were answered.     Written by Katina Daniels, as dictated by Dr. Erika Sampson Vincenzo Lafleur MD.

## 2017-04-24 NOTE — PATIENT INSTRUCTIONS
Anastrozole (By mouth)   Anastrozole (an-AS-troe-zole)  Treats breast cancer. Brand Name(s):Arimidex   There may be other brand names for this medicine. When This Medicine Should Not Be Used: This medicine is not right for everyone. Do not use it if you had an allergic reaction to anastrozole, if you are pregnant, or if you have not stopped menstruating (premenopausal). How to Use This Medicine:   Tablet  · Medicines used to treat cancer are very strong and can have many side effects. Before receiving this medicine, make sure you understand all the risks and benefits. It is important for you to work closely with your doctor during your treatment. · Your doctor will tell you how much medicine to use. Do not use more than directed. · Read and follow the patient instructions that come with this medicine. Talk to your doctor or pharmacist if you have any questions. · Missed dose: Take a dose as soon as you remember. If it is almost time for your next dose, wait until then and take a regular dose. Do not take extra medicine to make up for a missed dose. · If you vomit after taking your medicine, call your doctor or pharmacist for instructions. · Store the medicine in a closed container at room temperature, away from heat, moisture, and direct light. · Ask your pharmacist, doctor, or health caregiver about the best way to dispose of any leftover medicine after you have finished your treatment. You will also need to throw away old medicine after the expiration date has passed. Drugs and Foods to Avoid:   Ask your doctor or pharmacist before using any other medicine, including over-the-counter medicines, vitamins, and herbal products. · Some medicines can affect how anastrozole works.  Tell your doctor if you are taking any of the following:   ¨ Medicine that contains estrogen  ¨ Tamoxifen  Warnings While Using This Medicine:   · Pregnancy after menopause is not likely, but if you think you could be pregnant, tell your doctor. This medicine could harm an unborn baby. · Tell your doctor if you are breastfeeding, or if you have liver disease, bone problems (such as osteoporosis), high cholesterol, or heart disease. · This medicine may cause the following problems:   ¨ Increased risk for weak bones or osteoporosis  ¨ Increased cholesterol levels  ¨ Increased risk for heart attack or stroke  · Your doctor will do lab tests at regular visits to check on the effects of this medicine. Keep all appointments. · Cancer medicine can cause nausea or vomiting, sometimes even after you receive medicine to prevent these effects. Ask your doctor or nurse about other ways to control any nausea or vomiting that might happen. · Keep all medicine out of the reach of children. Never share your medicine with anyone. Possible Side Effects While Using This Medicine:   Call your doctor right away if you notice any of these side effects:  · Allergic reaction: Itching or hives, swelling in your face or hands, swelling or tingling in your mouth or throat, chest tightness, trouble breathing  · Back, bone, joint, or muscle pain  · Blistering, peeling, or red skin rash  · Chest pain or shortness of breath  · Fever, chills, cough, sore throat, and body aches  · Numbness, tingling, or burning pain in your hands, arms, legs, or feet  · Rapid weight gain, or swelling in your hands, ankles, or feet  · Severe diarrhea, nausea, or vomiting  · Vaginal bleeding or discharge  · Yellowing of your skin or the whites of your eyes  If you notice these less serious side effects, talk with your doctor:   · Breast pain  · Dizziness, headache, tiredness, or weakness  · Mood changes, anxiety, or irritability  · Trouble sleeping  · Warmth or redness in your face, neck, arms, or upper chest  If you notice other side effects that you think are caused by this medicine, tell your doctor. Call your doctor for medical advice about side effects.  You may report side effects to FDA at 8-187-FDA-0242  © 2016 [address] is for End User's use only and may not be sold, redistributed or otherwise used for commercial purposes. The above information is an  only. It is not intended as medical advice for individual conditions or treatments. Talk to your doctor, nurse or pharmacist before following any medical regimen to see if it is safe and effective for you.

## 2017-04-25 NOTE — COMMUNICATION BODY
HISTORY OF PRESENT ILLNESS  Kady Davila is a 51 y.o. female. HPI  ESTABLISHED patient here for LEFT breast pain and nipple discharge today which is whitish. The patient went to her GYN today, and when he was examining her, she noticed that her breast was very sore underneath the breast. She then squeezed her nipple several times and was able to express some whitish discharge. She has not had any spontaneous nipple discharge.      She is having mood swings and some joint pain on the anastrozole. Sees Dr. Jeet Peterson mid-May.      01/29/2016: RIGHT core bx of [address] with [facility]. [place]+100%/[place]+90% Her2- ([facility]: [place]+80%/[place]+20%)  03/31/2016: RIGHT lumpectomy with SNBx of 1.9 cm, gr2 ILC with DCIS and LCIS. 2/2 nodes involved. Clear margins. pT1c pN1a Mx.  05/17/2016: RIGHT ALND. 0/10 nodes involved. 08/18/2016: completed adjuvant chemo (TC x4).  09/23/2016-01/10/2017: completed all but 8 treatments of XRT (stopped midway and started back again).      Past Medical History:   Diagnosis Date    Arthritis     HANDS AND KNEES    Asthma     Cancer ([address] 2016    RIGHT breast cancer    Depression     GERD (gastroesophageal reflux disease)     Heart murmur     Hypertension     Neuropathy     PUD (peptic ulcer disease)     Vertigo        Past Surgical History:   Procedure Laterality Date    BREAST SURGERY PROCEDURE UNLISTED Right 5/17/16    RIGHT axillary lymph node dissection and PAC insertion    HX BREAST BIOPSY Right     SEVERAL TIMES    HX BREAST LUMPECTOMY Right 3/31/2016    RIGHT BREAST LUMPECTOMY, RIGHT SENTINEL NODE BIOPSY WITH ULTRASOUND performed by Kevin Busch MD at [address] HX CHOLECYSTECTOMY      HX GI      COLONOSCOPY    HX GYN      cesarian x 3    HX HYSTERECTOMY      Still has ovaries    HX ORTHOPAEDIC Left     ARM FX WITH PINS    HX TONSILLECTOMY      HX UROLOGICAL      CYSTOSCOPY    HX UROLOGICAL      BLADDER SLING       Social History     Social History    Marital status:      Spouse name: N/A    Number of children: N/A    Years of education: N/A     Occupational History    Not on file. Social History Main Topics    Smoking status: Never Smoker    Smokeless tobacco: Never Used    Alcohol use 0.0 oz/week     0 Standard drinks or equivalent per week      Comment: occasionally a glass of wine    Drug use: No    Sexual activity: Yes     Partners: Male     Birth control/ protection: None     Other Topics Concern    Not on file     Social History Narrative    Recently moved from [place], they have been here since April. . Current Outpatient Prescriptions on File Prior to Visit   Medication Sig Dispense Refill    zolpidem CR (AMBIEN CR) 12.5 mg tablet TK 1 T PO QD HS PRN  2    anastrozole (ARIMIDEX) 1 mg tablet Take 1 Tab by mouth daily. Indications: HORMONE RECEPTOR POSITIVE BREAST CANCER 90 Tab 3    cyanocobalamin 1,000 mcg tablet Take 1,000 mcg by mouth daily.  gabapentin (NEURONTIN) 100 mg capsule Take 1 Cap by mouth three (3) times daily. 90 Cap 1    VENTOLIN HFA 90 mcg/actuation inhaler       citalopram (CELEXA) 40 mg tablet Take 40 mg by mouth nightly.  losartan (COZAAR) 25 mg tablet Take 1 Tab by mouth daily. 90 Tab 1     No current facility-administered medications on file prior to visit. Allergies   Allergen Reactions    Adhesive Tape-Silicones Rash and Itching    Darvocet A500 [Propoxyphene N-Acetaminophen] Nausea Only and Other (comments)     headache    Diazepam Other (comments)     Not an allergy, just does not work for her at all.  Hydrocodone Nausea and Vomiting and Vertigo    Percocet [Oxycodone-Acetaminophen] Nausea Only and Other (comments)     headache    Tylenol-Codeine #2 Itching, Nausea Only and Other (comments)     HEADACHES         OB History     Obstetric Comments    Menarche:  8. LMP: 38.  # of Children:  3. Age at Delivery of First Child:  20.   Hysterectomy/oophorectomy:  YES/NO.   Breast Bx: Yes,.  Hx of Breast Feeding:  Yes. BCP:  Yes, in the past. Hormone therapy:  Yes. ROS  Constitutional: Negative    HENT: Negative. Eyes: Negative. Respiratory: Negative. Cardiovascular: Negative. Gastrointestinal: Negative. Genitourinary: Negative. Musculoskeletal: Negative. Skin: Negative. Neurological: Negative. Endo/Heme/Allergies: Negative. Psychiatric/Behavioral: Negative. Physical Exam   Cardiovascular: Normal rate and normal heart sounds. Pulmonary/Chest: Breath sounds normal. Right breast exhibits no inverted nipple, no mass, no nipple discharge, no skin change and no tenderness. Left breast exhibits tenderness. Left breast exhibits no inverted nipple, no mass, no nipple discharge and no skin change. Breasts are symmetrical.       Lymphadenopathy:        Right cervical: No superficial cervical, no deep cervical and no posterior cervical adenopathy present. Left cervical: No superficial cervical, no deep cervical and no posterior cervical adenopathy present. Right axillary: No pectoral and no lateral adenopathy present. Left axillary: No pectoral and no lateral adenopathy present. BREAST ULTRASOUND  Indication: left breast pain 6:00  Technique: The area was scanned using a high-frequency linear-array near-field transducer  Findings: No abnormal mass, lesion, or shadowing noted. No cysts  Impression: Normal dense breast tissue   Disposition: Will order MRI to ensure benign findings    ASSESSMENT and PLAN    ICD-10-CM ICD-9-CM    1. Malignant neoplasm of upper-outer quadrant of right female breast ([facility] [address] C50.411 174.4      Pt presents with LT breast pain with hx of yellow nipple discharge. Normal exam. Will order MRI to ensure benign findings, and f/u once imaging results become available. This plan was reviewed with the patient and patient agrees. All questions were answered.     Written by Junior Shukla, as dictated by Dr. Joao Hearn Bailey Padilla MD.

## 2017-04-28 ENCOUNTER — TELEPHONE (OUTPATIENT)
Dept: MRI IMAGING | Age: 47
End: 2017-04-28

## 2017-05-02 ENCOUNTER — DOCUMENTATION ONLY (OUTPATIENT)
Dept: SURGERY | Age: 47
End: 2017-05-02

## 2017-05-02 ENCOUNTER — TELEPHONE (OUTPATIENT)
Dept: SURGERY | Age: 47
End: 2017-05-02

## 2017-05-02 NOTE — PROGRESS NOTES
Received VM from Jareth Bourne at [facility] that patient's MRI was denied. They have attempted to auth this again, and will keep us posted.

## 2017-05-02 NOTE — TELEPHONE ENCOUNTER
Spoke to patient after denial of breast MRI. I told her I would check with Dr. Akash Bedoya to see what the next step would be. She wonders if he is very worried about the nipple discharge and pain. I told her that from the note it sounded like he was ordering the breast MRI to rule out anything more serious because he felt like most like this was a benign problem. I told he I would call her tomorrow with a plan. She was very appreciative of the phone call.

## 2017-05-05 ENCOUNTER — TELEPHONE (OUTPATIENT)
Dept: SURGERY | Age: 47
End: 2017-05-05

## 2017-05-11 ENCOUNTER — TELEPHONE (OUTPATIENT)
Dept: ONCOLOGY | Age: 47
End: 2017-05-11

## 2017-05-12 NOTE — TELEPHONE ENCOUNTER
Call placed to patient. HIPAA verified. States she is having side effects & problems with Anastrozole. Reports she began taking medication in Feb. & symptoms have gradually worsened. She reports sweating, breast pain, joint pain and pain to bottom of bilat. feet with ambulation. States she has increased her Vit B12 dose to 2000 mg daily but it hasn't helped. Reports she stopped taking Anastrozole 2 days ago. Advised to continue to hold medication. She is scheduled for follow up office visit here on 5/19/17. Let her know an update would be sent to provider & we will contact her if any recommendations prior to office visit. She verbalized understanding.

## 2017-05-19 ENCOUNTER — OFFICE VISIT (OUTPATIENT)
Dept: ONCOLOGY | Age: 47
End: 2017-05-19

## 2017-05-19 VITALS
RESPIRATION RATE: 16 BRPM | DIASTOLIC BLOOD PRESSURE: 74 MMHG | SYSTOLIC BLOOD PRESSURE: 118 MMHG | BODY MASS INDEX: 41.25 KG/M2 | TEMPERATURE: 98.2 F | HEIGHT: 63 IN | WEIGHT: 232.8 LBS | HEART RATE: 88 BPM | OXYGEN SATURATION: 97 %

## 2017-05-19 DIAGNOSIS — C50.411 MALIGNANT NEOPLASM OF UPPER-OUTER QUADRANT OF RIGHT FEMALE BREAST (HCC): Primary | ICD-10-CM

## 2017-05-19 DIAGNOSIS — C77.3 BREAST CANCER METASTASIZED TO AXILLARY LYMPH NODE, RIGHT (HCC): ICD-10-CM

## 2017-05-19 DIAGNOSIS — R23.2 HOT FLASHES: ICD-10-CM

## 2017-05-19 DIAGNOSIS — M19.90 ARTHRITIS: ICD-10-CM

## 2017-05-19 DIAGNOSIS — I10 ESSENTIAL HYPERTENSION: ICD-10-CM

## 2017-05-19 DIAGNOSIS — C50.911 BREAST CANCER METASTASIZED TO AXILLARY LYMPH NODE, RIGHT (HCC): ICD-10-CM

## 2017-05-19 DIAGNOSIS — Z98.890 S/P LUMPECTOMY, RIGHT BREAST: ICD-10-CM

## 2017-05-19 RX ORDER — METRONIDAZOLE 500 MG/1
TABLET ORAL
COMMUNITY
Start: 2017-04-27 | End: 2017-07-11

## 2017-05-19 RX ORDER — GABAPENTIN 300 MG/1
300 CAPSULE ORAL 3 TIMES DAILY
COMMUNITY
Start: 2017-04-14

## 2017-05-19 RX ORDER — TAMOXIFEN CITRATE 20 MG/1
20 TABLET ORAL DAILY
Qty: 30 TAB | Refills: 3 | Status: SHIPPED | OUTPATIENT
Start: 2017-05-19 | End: 2019-07-31 | Stop reason: SINTOL

## 2017-05-19 RX ORDER — AZITHROMYCIN 250 MG/1
TABLET, FILM COATED ORAL
COMMUNITY
Start: 2017-04-24 | End: 2017-07-11

## 2017-05-19 RX ORDER — ZOLPIDEM TARTRATE 10 MG/1
TABLET ORAL
COMMUNITY
Start: 2017-05-11 | End: 2020-07-23 | Stop reason: SDUPTHER

## 2017-05-19 NOTE — PROGRESS NOTES
Queen Jack is a 51 y.o. female here today for hormone therapy f/u. Patient C/O tenderness to both breast when touching.

## 2017-05-19 NOTE — PATIENT INSTRUCTIONS
Tamoxifen   This information is about a hormonal therapy used to treat breast cancer called tamoxifen. It should ideally be read with our general information about breast cancer or, if appropriate, secondary breast cancer. On this page     Tamoxifen   How it works   How it is taken   When it may be given   Preventative treatment   Possible side effects   Less common side effects   Long-term side effects   Additional information   Things to remember about tamoxifen tablets   References    Tamoxifen Back to top  Tamoxifen is an anti-oestrogen drug that was developed over 30 years ago. It is used widely to treat breast cancer and is occasionally used to treat some other cancers. Tamoxifen can also be used to treat or prevent side effects of breast tenderness and swelling in some men with prostate cancer . These side effects can happen as a result of other hormonal treatments. How it works Back to top  The way in which tamoxifen works is quite complicated and not yet fully understood. Its main function is as an anti-oestrogen drug. Many breast cancers rely on the female sex hormone oestrogen to grow. Hormone-sensitive breast cancer cells have proteins called receptors that the sex hormones attach to. They are known as 'oestrogen-receptor positive' (ER-positive) breast cancers. When oestrogen comes into contact with the receptors, it fits into them and stimulates the cancer cells to divide so that the tumour grows. Tamoxifen fits into the oestrogen receptor and blocks oestrogen from reaching the cancer cells. This means the cancer either grows more slowly or stops growing altogether. How it is taken Back to top  Tamoxifen is a tablet that should be swallowed whole with a glass of water. The tablets come in different strengths: 10mg, 20mg and 40mg. The drug is manufactured under several different brand names and these may appear on the tablets.  It's also available as a sugar free syrup for people who have difficulty swallowing tablets. Tamoxifen is usually prescribed as a single daily dose and this should be taken at the same time each day. You may prefer to take the tablet with food as it may make you feel sick and can leave a metallic taste in your mouth. It's best to find a convenient time for you and stick to it. When it may be given Back to top  Your doctor will take into account a number of different factors when planning your treatment. The breast cancer cells will be tested to see if they have have oestrogen receptors (ER) and progesterone receptors (LA). Tamoxifen is only effective for people who have hormone-sensitive breast cancers. Tamoxifen may be given after surgery for early breast cancer, to reduce the risk of the cancer coming back. This is known as adjuvant therapy. You may be given tamoxifen for five years. Some people may have tamoxifen for two or three years and then change to a different type of hormonal therapy known as an aromatase inhibitor. Aromatase inhibitors include anastrozole (Arimidex®), exemestane (Aromasin®) and letrozole (Femara®). Occasionally tamoxifen is used before surgery, to shrink a large breast cancer. This means that a lumpectomy (removal of the lump) may be possible, rather than removal of the whole breast (a mastectomy). Tamoxifen can also be used to control a cancer that has come back or spread to other parts of the body. Preventative treatment Back to top  Research has been carried out to see if tamoxifen can prevent breast cancer in women who have a high risk of developing the disease. 'High risk' is defined as having one or more close relatives (mother or sister) who had breast cancer before they were 48. The results from some research trials show that tamoxifen helps to prevent ER-positive breast cancers in high risk women, but not ER-negative breast cancers. Other trials are still ongoing.    If you think that a member of your family may be at high risk of breast cancer, they should discuss the possible monitoring and treatment options with their doctor. Possible side effects Back to top  Each person's reaction to any medicine is different. Some people have very few side effects while others may experience more. Very rarely, if the side effects are severe, you may have to stop taking tamoxifen and a different drug may be prescribed. You may have some of the following side effects, to varying degrees:   Hot flushes and sweats These are a common side effect of tamoxifen. They may gradually lessen over the first few months but some people continue to have them for as long as they take tamoxifen. There are a number of ways to help reduce or control hot flushes and sweats. Some people find it helps to avoid or cut down on tea, coffee, nicotine and alcohol. Research suggests that some types of antidepressants may be helpful in controlling this side effect. Your doctor or nurse can discuss this with you. Some people may find complementary therapies such as acupuncture helpful. Your GP may be able to give you details about having these on the [facility]. If you find your own complementary therapist, make sure that they are properly qualified and registered. If the hot flushes are really troublesome, you can discuss possible treatments with your doctor. They may be able to prescribe a different hormonal therapy instead. Sometimes the hot flushes continue for a long time after you have stopped taking tamoxifen. You can read more about treatments for menopausal symptoms like hot flushes in our information about Breast cancer and menopausal symptoms. Nausea and indigestion Feeling sick (nausea) and indigestion are fairly common, but can often be relieved by taking your tablets with food or milk, or at night. Although nausea is quite common initially, it usually wears off after a few weeks.    Weight gain Some women notice that they put on weight while taking tamoxifen. This may be due to water retention. Change in periods Women who have not yet reached the menopause may notice that their monthly periods change - they may become irregular, lighter or sometimes stop altogether. Some women also notice an increase in vaginal discharge and itching of the area around the vagina (the vulva). Leg cramps Some women get leg cramps with tamoxifen. Walking may stretch the muscle and help with this. Let your doctor know if leg cramps are a problem. If your leg becomes red, hot or swollen (see blood clots, below), tell your doctor immediately. Less common side effects Back to top  Depression, tiredness and dizziness Some women feel depressed while taking tamoxifen, but this may be due to other causes. Headaches Some people affected by migraine notice a change in the pattern of their headaches. Blood clots (thrombosis) In post-menopausal women, tamoxifen can slightly increase the risk of blood clots and strokes. If you have any pain, warmth, swelling or tenderness in an arm or leg (or any chest pain), you must tell your doctor straight away. Vision problems Blurred or reduced vision is very rare, but any changes in your eyesight should be reported to your doctor. Voice changes This has been reported by some people. Professional singers may want to seek help and advice from their doctor. Tumour flare People who are prescribed tamoxifen for cancer that has spread to the bones, may experience something called tumour flare when they start taking tamoxifen. This can cause a raised level of calcium in the blood (hypercalcaemia), with symptoms of nausea, vomiting and thirst. Very occasionally, a short stay in hospital is needed until the calcium levels have been reduced. Rare side effects of tamoxifen These may include mild allergic reactions, such as skin rashes and hair loss (this will grow back later).    Always let your doctor or nurse know about any side effects you have. There are usually ways in which they can be controlled or improved. Long-term side effects Back to top  Studies have shown that post-menopausal women who take tamoxifen over a long period of time may have a very slightly increased risk of developing cancer of the lining of the womb (endometrial cancer). However, this small risk is generally outweighed by the benefits of taking tamoxifen. If detected early, treatment for endometrial cancer is usually very successful. An early warning sign is abnormal vaginal bleeding, although this is often caused by a non-cancerous condition such as polyps. If you have any abnormal vaginal bleeding you should let your doctor know. Some women may have regular gynaecological check-ups. Additional information Back to top  Contraception Tamoxifen should not be taken during pregnancy as it may harm the developing baby. Although it can affect a woman's periods, tamoxifen is not a contraceptive. It's important to use an effective, non-hormonal form of contraception during treatment. Fertility Women who haven't been through their menopause may still become pregnant when they've finished treatment with tamoxifen. Doctors usually advise you to wait for a few months after tamoxifen treatment is over before you try to get pregnant. Talk to your doctor first if you are thinking of getting pregnant. Risk of blood clots If you have a history of blood clots or deep vein thrombosis (DVT) let your doctor know, as tamoxifen may not be suitable for you. Interaction with warfarin Tamoxifen can increase the effect of the drug warfarin, which is used to thin the blood in people prone to blood clots. If you are taking warfarin, let your doctor know straight away.    CYP2D6 inhibitors There is some research that suggests some drugs - including the antidepressants paroxetine (Seroxat®) and fluoxetine (Prozac®) - may cause tamoxifen to be less effective, but this isn't certain. Tell your doctors about any other medicines you are taking. Bone loss Tamoxifen may help to prevent bone loss in women who have been through their menopause. In some women it may reduce the risk of thinning of the bone (osteoporosis). Heart disease Tamoxifen can lower the level of fat (lipids) in your blood (high levels of fat contribute to heart disease). But research doesn't show that tamoxifen reduces the risk of heart disease. Things to remember about tamoxifen tablets Back to top  Tamoxifen may interact with other medicines. Tell your doctor about any medicines you are taking, including non-prescribed drugs such as complementary therapies, vitamins and herbal drugs. Keep the tablets in a safe place, out of reach of children. Keep the tablets in the original packaging/container and store them at room temperature, away from moisture, direct heat and sunlight. Store the tablets in the original packaging/container. They should be kept at room temperature, away from moisture and direct heat. If your doctor decides to stop the treatment, return any remaining tablets to the pharmacist. Thomes Manners not flush them down the toilet or throw them away. Don't worry if you forget to take your tablet. The levels of the drug in your blood will not change very much, but try not to miss more than one or two tablets in a row. Remember to get a new prescription a few weeks before you run out of tablets. Make sure you have plenty for holidays. References Back to top  This section has been compiled using information from a number of reliable sources including:   Raffi chambers al. Santhosh Silverman: The Complete Drug Reference. 36th edition. 2009. [employer]. [employer]. 59th edition. 2010. [facility] and [address] [place] [address]. [employer] www.[employer]/online/index. do (accessed September 2010)   electronic Medicines Compendium [facility]). www.medicines. org.uk (accessed September 2010)   Nydia MORALES, et al. Selective seratonin reuptake inhibitors and breast cancer mortality in women receiving tamoxifen: a population based cohort study. BMJ. 2010. 340: M322. For further references, please see the general bibliography.

## 2017-05-19 NOTE — MR AVS SNAPSHOT
Visit Information Date & Time Provider Department Dept. Phone Encounter #  
 5/19/2017 10:30 AM DO Marcos Stout Oncology at [facility] [phone] Follow-up Instructions Return in about 2 months (around 7/19/2017). Routing History Follow-up and Disposition History Your Appointments 7/11/2017  1:30 PM  
Follow Up with Trini Dunbar NP [address] at [facility] Appt Note: LEFT breast 6 month follow up Cp$0 1/13/17; LEFT breast 6 month follow up Cp$0 1/13/17 [address] [place] [address] [facility] [address]  
[phone]  
  
    
 7/18/2017 10:00 AM  
Follow Up with DO Marcos Stout Oncology at [facility] Appt Note: hormone therapy f/u post mammo [address] [phone]  
[phone]  
  
   
 [address] [address] [facility] [address] Upcoming Health Maintenance Date Due Pneumococcal 19-64 Highest Risk (1 of 3 - PCV13) 10/21/1988 DTaP/Tdap/Td series (1 - Tdap) 10/21/1990 PAP AKA CERVICAL CYTOLOGY 10/21/1990 INFLUENZA AGE 9 TO ADULT 8/1/2017 Allergies as of 5/19/2017  Review Complete On: 5/19/2017 By: Corrie Cross DO Severity Noted Reaction Type Reactions Adhesive Tape-silicones  54/08/7109    Rash, Itching Darvocet A500 [Propoxyphene N-acetaminophen]  01/29/2016    Nausea Only, Other (comments)  
 headache Diazepam  05/04/2016    Other (comments) Not an allergy, just does not work for her at all. Hydrocodone  04/25/2016    Nausea and Vomiting, Vertigo Percocet [Oxycodone-acetaminophen]  01/29/2016    Nausea Only, Other (comments)  
 headache Tylenol-codeine #2  03/24/2016    Itching, Nausea Only, Other (comments) HEADACHES Current Immunizations  Reviewed on 2/9/2017 No immunizations on file. Not reviewed this visit You Were Diagnosed With   
  
 Codes Comments Malignant neoplasm of upper-outer quadrant of right female breast ([facility] [address]    -  Primary ICD-10-CM: C50.411 ICD-9-CM: 174.4 Breast cancer metastasized to axillary lymph node, right     ICD-10-CM: C50.911, C77.3 ICD-9-CM: 174.9, 196.3 S/P lumpectomy, right breast     ICD-10-CM: Z90.11 ICD-9-CM: V45.89 Arthritis     ICD-10-CM: M19.90 ICD-9-CM: 716.90 Essential hypertension     ICD-10-CM: I10 
ICD-9-CM: 401.9 Hot flashes     ICD-10-CM: R23.2 ICD-9-CM: 782.62 Vitals BP Pulse Temp Resp Height(growth percentile) Weight(growth percentile) 118/74 (BP 1 Location: Left arm, BP Patient Position: Sitting) 88 98.2 °F (36.8 °C) (Oral) 16 5' 3\" (1.6 m) 232 lb 12.8 oz (105.6 kg) LMP SpO2 BMI OB Status Smoking Status 07/04/2014 (Approximate) 97% 41.24 kg/m2 Hysterectomy Never Smoker Vitals History BMI and BSA Data Body Mass Index Body Surface Area  
 41.24 kg/m 2 2.17 m 2 Preferred Pharmacy Pharmacy Name [facility] [address] [place] [address] Your Updated Medication List  
  
   
This list is accurate as of: 5/19/17 11:51 AM.  Always use your most recent med list.  
  
  
  
  
 azithromycin 250 mg tablet Commonly known as:  ZITHROMAX  
  
 citalopram 40 mg tablet Commonly known as:  Zackery Marjorie Take 40 mg by mouth nightly. cyanocobalamin 1,000 mcg tablet Take 1,000 mcg by mouth daily. * gabapentin 100 mg capsule Commonly known as:  NEURONTIN Take 1 Cap by mouth three (3) times daily. * gabapentin 300 mg capsule Commonly known as:  NEURONTIN  
  
 losartan 25 mg tablet Commonly known as:  COZAAR Take 1 Tab by mouth daily. metroNIDAZOLE 500 mg tablet Commonly known as:  FLAGYL  
  
 tamoxifen 20 mg tablet Commonly known as:  NOLVADEX Take 1 Tab by mouth daily. Indications: HORMONE RECEPTOR POSITIVE BREAST CANCER  
  
 VENTOLIN HFA 90 mcg/actuation inhaler Generic drug:  albuterol * zolpidem CR 12.5 mg tablet Commonly known as:  AMBIEN CR  
TK 1 T PO QD HS PRN  
  
 * zolpidem 10 mg tablet Commonly known as:  AMBIEN  
  
 * Notice: This list has 4 medication(s) that are the same as other medications prescribed for you. Read the directions carefully, and ask your doctor or other care provider to review them with you. Prescriptions Sent to Pharmacy Refills  
 tamoxifen (NOLVADEX) 20 mg tablet 3 Sig: Take 1 Tab by mouth daily. Indications: HORMONE RECEPTOR POSITIVE BREAST CANCER Class: Normal  
 Pharmacy: [address], [facility] #: [phone] Route: Oral  
  
Follow-up Instructions Return in about 2 months (around 7/19/2017). To-Do List   
 07/11/2017 12:30 PM  
  Appointment with [facility] 5 at [address] [place] [address] ([phone]) Shower or bathe using soap and water. Do not use deodorant, powder, perfumes, or lotion the day of your exam.  If your prior mammograms were not performed at [facility] 6 please bring films with you or forward prior images 2 days before your procedure. If patient is not a callback diagnostic, the patient must have an order/script from the physician for the diagnostic. Please bring it on the day of the mammogram or have it faxed to the department. [facility]  [phone] [facility] [address]  [phone] [facility] [phone] [facility] [address] [facility] [phone] [facility] [place] [phone] Patient Instructions Tamoxifen This information is about a hormonal therapy used to treat breast cancer called tamoxifen. It should ideally be read with our general information about breast cancer or, if appropriate, secondary breast cancer. On this page Tamoxifen How it works How it is taken When it may be given Preventative treatment Possible side effects Less common side effects Long-term side effects Additional information Things to remember about tamoxifen tablets References Tamoxifen Back to top Tamoxifen is an anti-oestrogen drug that was developed over 30 years ago. It is used widely to treat breast cancer and is occasionally used to treat some other cancers. Tamoxifen can also be used to treat or prevent side effects of breast tenderness and swelling in some men with prostate cancer . These side effects can happen as a result of other hormonal treatments. How it works Back to top The way in which tamoxifen works is quite complicated and not yet fully understood. Its main function is as an anti-oestrogen drug. Many breast cancers rely on the female sex hormone oestrogen to grow. Hormone-sensitive breast cancer cells have proteins called receptors that the sex hormones attach to. They are known as 'oestrogen-receptor positive' (ER-positive) breast cancers. When oestrogen comes into contact with the receptors, it fits into them and stimulates the cancer cells to divide so that the tumour grows. Tamoxifen fits into the oestrogen receptor and blocks oestrogen from reaching the cancer cells. This means the cancer either grows more slowly or stops growing altogether. How it is taken Back to top Tamoxifen is a tablet that should be swallowed whole with a glass of water. The tablets come in different strengths: 10mg, 20mg and 40mg. The drug is manufactured under several different brand names and these may appear on the tablets. It's also available as a sugar free syrup for people who have difficulty swallowing tablets. Tamoxifen is usually prescribed as a single daily dose and this should be taken at the same time each day. You may prefer to take the tablet with food as it may make you feel sick and can leave a metallic taste in your mouth.  It's best to find a convenient time for you and stick to it. When it may be given Back to top Your doctor will take into account a number of different factors when planning your treatment. The breast cancer cells will be tested to see if they have have oestrogen receptors (ER) and progesterone receptors (MO). Tamoxifen is only effective for people who have hormone-sensitive breast cancers. Tamoxifen may be given after surgery for early breast cancer, to reduce the risk of the cancer coming back. This is known as adjuvant therapy. You may be given tamoxifen for five years. Some people may have tamoxifen for two or three years and then change to a different type of hormonal therapy known as an aromatase inhibitor. Aromatase inhibitors include anastrozole (Arimidex®), exemestane (Aromasin®) and letrozole (Femara®). Occasionally tamoxifen is used before surgery, to shrink a large breast cancer. This means that a lumpectomy (removal of the lump) may be possible, rather than removal of the whole breast (a mastectomy). Tamoxifen can also be used to control a cancer that has come back or spread to other parts of the body. Preventative treatment Back to top Research has been carried out to see if tamoxifen can prevent breast cancer in women who have a high risk of developing the disease. 'High risk' is defined as having one or more close relatives (mother or sister) who had breast cancer before they were 48. The results from some research trials show that tamoxifen helps to prevent ER-positive breast cancers in high risk women, but not ER-negative breast cancers. Other trials are still ongoing. If you think that a member of your family may be at high risk of breast cancer, they should discuss the possible monitoring and treatment options with their doctor. Possible side effects Back to top Each person's reaction to any medicine is different.  Some people have very few side effects while others may experience more. Very rarely, if the side effects are severe, you may have to stop taking tamoxifen and a different drug may be prescribed. You may have some of the following side effects, to varying degrees:  
Hot flushes and sweats These are a common side effect of tamoxifen. They may gradually lessen over the first few months but some people continue to have them for as long as they take tamoxifen. There are a number of ways to help reduce or control hot flushes and sweats. Some people find it helps to avoid or cut down on tea, coffee, nicotine and alcohol. Research suggests that some types of antidepressants may be helpful in controlling this side effect. Your doctor or nurse can discuss this with you. Some people may find complementary therapies such as acupuncture helpful. Your GP may be able to give you details about having these on the NHS. If you find your own complementary therapist, make sure that they are properly qualified and registered. If the hot flushes are really troublesome, you can discuss possible treatments with your doctor. They may be able to prescribe a different hormonal therapy instead. Sometimes the hot flushes continue for a long time after you have stopped taking tamoxifen. You can read more about treatments for menopausal symptoms like hot flushes in our information about Breast cancer and menopausal symptoms. Nausea and indigestion Feeling sick (nausea) and indigestion are fairly common, but can often be relieved by taking your tablets with food or milk, or at night. Although nausea is quite common initially, it usually wears off after a few weeks. Weight gain Some women notice that they put on weight while taking tamoxifen. This may be due to water retention.   
Change in periods Women who have not yet reached the menopause may notice that their monthly periods change  they may become irregular, lighter or sometimes stop altogether. Some women also notice an increase in vaginal discharge and itching of the area around the vagina (the vulva). Leg cramps Some women get leg cramps with tamoxifen. Walking may stretch the muscle and help with this. Let your doctor know if leg cramps are a problem. If your leg becomes red, hot or swollen (see blood clots, below), tell your doctor immediately. Less common side effects Back to top Depression, tiredness and dizziness Some women feel depressed while taking tamoxifen, but this may be due to other causes. Headaches Some people affected by migraine notice a change in the pattern of their headaches. Blood clots (thrombosis) In post-menopausal women, tamoxifen can slightly increase the risk of blood clots and strokes. If you have any pain, warmth, swelling or tenderness in an arm or leg (or any chest pain), you must tell your doctor straight away. Vision problems Blurred or reduced vision is very rare, but any changes in your eyesight should be reported to your doctor. Voice changes This has been reported by some people. Professional singers may want to seek help and advice from their doctor. Tumour flare People who are prescribed tamoxifen for cancer that has spread to the bones, may experience something called tumour flare when they start taking tamoxifen. This can cause a raised level of calcium in the blood (hypercalcaemia), with symptoms of nausea, vomiting and thirst. Very occasionally, a short stay in hospital is needed until the calcium levels have been reduced. Rare side effects of tamoxifen These may include mild allergic reactions, such as skin rashes and hair loss (this will grow back later). Always let your doctor or nurse know about any side effects you have. There are usually ways in which they can be controlled or improved. Long-term side effects Back to top Studies have shown that post-menopausal women who take tamoxifen over a long period of time may have a very slightly increased risk of developing cancer of the lining of the womb (endometrial cancer). However, this small risk is generally outweighed by the benefits of taking tamoxifen. If detected early, treatment for endometrial cancer is usually very successful. An early warning sign is abnormal vaginal bleeding, although this is often caused by a non-cancerous condition such as polyps. If you have any abnormal vaginal bleeding you should let your doctor know. Some women may have regular gynaecological check-ups. Additional information Back to top Contraception Tamoxifen should not be taken during pregnancy as it may harm the developing baby. Although it can affect a woman's periods, tamoxifen is not a contraceptive. It's important to use an effective, non-hormonal form of contraception during treatment. Fertility Women who haven't been through their menopause may still become pregnant when they've finished treatment with tamoxifen. Doctors usually advise you to wait for a few months after tamoxifen treatment is over before you try to get pregnant. Talk to your doctor first if you are thinking of getting pregnant. Risk of blood clots If you have a history of blood clots or deep vein thrombosis (DVT) let your doctor know, as tamoxifen may not be suitable for you. Interaction with warfarin Tamoxifen can increase the effect of the drug warfarin, which is used to thin the blood in people prone to blood clots. If you are taking warfarin, let your doctor know straight away. CYP2D6 inhibitors There is some research that suggests some drugs - including the antidepressants paroxetine (Seroxat®) and fluoxetine (Prozac®) - may cause tamoxifen to be less effective, but this isn't certain. Tell your doctors about any other medicines you are taking.   
Bone loss Tamoxifen may help to prevent bone loss in women who have been through their menopause. In some women it may reduce the risk of thinning of the bone (osteoporosis). Heart disease Tamoxifen can lower the level of fat (lipids) in your blood (high levels of fat contribute to heart disease). But research doesn't show that tamoxifen reduces the risk of heart disease. Things to remember about tamoxifen tablets Back to top Tamoxifen may interact with other medicines. Tell your doctor about any medicines you are taking, including non-prescribed drugs such as complementary therapies, vitamins and herbal drugs. Keep the tablets in a safe place, out of reach of children. Keep the tablets in the original packaging/container and store them at room temperature, away from moisture, direct heat and sunlight. Store the tablets in the original packaging/container. They should be kept at room temperature, away from moisture and direct heat. If your doctor decides to stop the treatment, return any remaining tablets to the pharmacist. Elzaestloli Mariner not flush them down the toilet or throw them away. Don't worry if you forget to take your tablet. The levels of the drug in your blood will not change very much, but try not to miss more than one or two tablets in a row. Remember to get a new prescription a few weeks before you run out of tablets. Make sure you have plenty for holidays. References Back to top This section has been compiled using information from a number of reliable sources including:  
Raffi chambers al. Lawson Nicolas: The Complete Drug Reference. 36th edition. 2009. [employer]. [employer]. 59th edition. 2010. [facility] and [address] [place] [address]. [employer] www.KILTR/online/index. do (accessed September 2010)  
electronic Medicines Compendium [facility]). www.medicines. org.uk (accessed September 2010) Nydia MORALES, et al. Selective seratonin reuptake inhibitors and breast cancer mortality in women receiving tamoxifen: a population based cohort study. BMJ. 2010. 340: T749. For further references, please see the general bibliography. Introducing [facility]! Dear Cherelle Gallardo: Thank you for requesting a [employer] account. Our records indicate that you already have an active [employer] account. You can access your account anytime at [email or web address] Did you know that you can access your hospital and ER discharge instructions at any time in [employer]? You can also review all of your test results from your hospital stay or ER visit. Additional Information If you have questions, please visit the Frequently Asked Questions section of the [employer] website at [email or web address] Remember, [employer] is NOT to be used for urgent needs. For medical emergencies, dial 911. Now available from your iPhone and Android! Please provide this summary of care documentation to your next provider. Your primary care clinician is listed as Juan Manuel Luke. If you have any questions after today's visit, please call [phone].

## 2017-05-19 NOTE — PROGRESS NOTES
HEME/ONC PROGRESS NOTE       Santiago Herzog is a 51 y.o. 1970 female and presents with Follow-up (Hormone Therapy)    CC  Breast cancer 1/16    HPI: Pt is here for f/u breast cancer after stopping AI due to side effects after taking it for a few months. C/o tenderness to both breasts and saw breast surgery for this. C/o mood swings/ joint pains stiffness/ hot flashes bad. Been off AI for 2 weeks and side effects getting better. Sexual side effects better off AI also. Pt not sure if she wants to try another pill. Last visit:  done radiation. Stopped radiation early 1/16 due to side effects. Pt has not been here since 10/16 per her preference. Pt did not show for PF appts and f/u here. Finished adjuvant chemotherapy with TC 8/16   Pt is here today to discuss next step in treatment/ hormonal therapy. Postmenopausal with hx of ERT in past.   Willing to take adjuvant AI. Pt still has ? Neuropathy in hands/ pain in hands. Is seeing neurology and rheumatology. States she has lymphedema. Pt states overall treatment has been overwhelming for her. Has had to work (as a ) and types all day. DX   Encounter Diagnoses   Name Primary?  Malignant neoplasm of upper-outer quadrant of right female breast ([address] Yes    Breast cancer metastasized to axillary lymph node, right     S/P lumpectomy, right breast     Arthritis     Essential hypertension     Hot flashes         STAGE:  2 ER+ HEr2 neg  mammoprint low risk     TREATMENT COURSE: right lumpectomy/  Axillary dissection. TC chemo completed 8/2016. radiation done 1/16.      Past Medical History:   Diagnosis Date    Arthritis     HANDS AND KNEES    Asthma     Cancer ([address] 2016    RIGHT breast cancer    Depression     GERD (gastroesophageal reflux disease)     Heart murmur     Hypertension     Neuropathy     PUD (peptic ulcer disease)     Vertigo      Past Surgical History:   Procedure Laterality Date    BREAST SURGERY PROCEDURE UNLISTED Right 5/17/16    RIGHT axillary lymph node dissection and PAC insertion    HX BREAST BIOPSY Right     SEVERAL TIMES    HX BREAST LUMPECTOMY Right 3/31/2016    RIGHT BREAST LUMPECTOMY, RIGHT SENTINEL NODE BIOPSY WITH ULTRASOUND performed by Dionicio Menchaca MD at [address] HX CHOLECYSTECTOMY      HX GI      COLONOSCOPY    HX GYN      cesarian x 3    HX HYSTERECTOMY      Still has ovaries    HX ORTHOPAEDIC Left     ARM FX WITH PINS    HX TONSILLECTOMY      HX UROLOGICAL      CYSTOSCOPY    HX UROLOGICAL      BLADDER SLING     Social History     Social History    Marital status:      Spouse name: N/A    Number of children: N/A    Years of education: N/A     Social History Main Topics    Smoking status: Never Smoker    Smokeless tobacco: Never Used    Alcohol use 0.0 oz/week     0 Standard drinks or equivalent per week      Comment: occasionally a glass of wine    Drug use: No    Sexual activity: Yes     Partners: Male     Birth control/ protection: None     Other Topics Concern    None     Social History Narrative    Recently moved from [place], they have been here since April. . Family History   Problem Relation Age of Onset    Hypertension Mother     Kidney Disease Mother      ON DIALYSIS    Heart Disease Mother     No Known Problems Father     Asthma Son     Asthma Daughter     No Known Problems Son     Anesth Problems Neg Hx        Current Outpatient Prescriptions   Medication Sig Dispense Refill    azithromycin (ZITHROMAX) 250 mg tablet       gabapentin (NEURONTIN) 300 mg capsule       metroNIDAZOLE (FLAGYL) 500 mg tablet       zolpidem (AMBIEN) 10 mg tablet       tamoxifen (NOLVADEX) 20 mg tablet Take 1 Tab by mouth daily. Indications: HORMONE RECEPTOR POSITIVE BREAST CANCER 30 Tab 3    cyanocobalamin 1,000 mcg tablet Take 1,000 mcg by mouth daily.       VENTOLIN HFA 90 mcg/actuation inhaler       citalopram (CELEXA) 40 mg tablet Take 40 mg by mouth nightly.  losartan (COZAAR) 25 mg tablet Take 1 Tab by mouth daily. 90 Tab 1    zolpidem CR (AMBIEN CR) 12.5 mg tablet TK 1 T PO QD HS PRN  2    gabapentin (NEURONTIN) 100 mg capsule Take 1 Cap by mouth three (3) times daily. 90 Cap 1       Allergies   Allergen Reactions    Adhesive Tape-Silicones Rash and Itching    Darvocet A500 [Propoxyphene N-Acetaminophen] Nausea Only and Other (comments)     headache    Diazepam Other (comments)     Not an allergy, just does not work for her at all.  Hydrocodone Nausea and Vomiting and Vertigo    Percocet [Oxycodone-Acetaminophen] Nausea Only and Other (comments)     headache    Tylenol-Codeine #2 Itching, Nausea Only and Other (comments)     HEADACHES         Review of Systems    A comprehensive review of systems was negative except for: per HPI     Objective:  Visit Vitals    /74 (BP 1 Location: Left arm, BP Patient Position: Sitting)    Pulse 88    Temp 98.2 °F (36.8 °C) (Oral)    Resp 16    Ht 5' 3\" (1.6 m)    Wt 232 lb 12.8 oz (105.6 kg)    LMP 07/04/2014 (Approximate)    SpO2 97%    BMI 41.24 kg/m2         Physical Exam:   General appearance - alert, well appearing, and in no distress and oriented to person, place, and time  Mental status - alert, oriented to person, place, and time, normal mood, behavior, speech, dress, motor activity, and thought processes  EYE-conj clear  Neck supple  Lungs - Clear to auscultation bilaterally  CV - Regular rate and rhythm  Abdomen - Round, soft, non-tender. Ext-no edema  Skin-Warm and dry. Neuro -alert, oriented, normal speech, no focal findings or movement disorder noted.    Breast - no masses palpable b/l    Diagnostic Imaging   reviewed  Results for orders placed during the hospital encounter of 08/24/16   XR CHEST PA LAT    Narrative Indication:  fever     Exam: PA and lateral views of the chest.    Direct comparison is made to prior CXR dated June 7, 2016.    Findings: Cardiomediastinal silhouette is within normal limits. Central venous  port catheter is unchanged in position. Lungs are clear bilaterally. Pleural  spaces are normal. Osseous structures are intact. Impression IMPRESSION: No acute cardiopulmonary disease. Results for orders placed during the hospital encounter of 05/06/16   CT ABDOMEN W WO CONT AND PELVIS W CONT    Narrative **Final Report**       ICD Codes / Adm. Diagnosis: 174.9  174.9 / Malignant neoplasm of breast (    Malignant neoplasm of breast  Examination:  CT ABD W WO CON PELVIS W CON  - [id number] - May  6 2016  2:44PM  Accession No:  [id number]  Reason:        REPORT:  EXAM:  CT THORAX W CON, CT ABD W WO CON PELVIS W CON    INDICATION:   breast cancer staging    COMPARISON: None. CONTRAST:  90 mL of Isovue-370     TECHNIQUE: Helical CT was performed of the abdomen without IV contrast and   with IV contrast during arterial administration. Multislice helical CT was performed from the thoracic inlet to the symphysis   pubis during uneventful rapid bolus intravenous contrast administration. Contiguous 5 mm axial images were reconstructed and lung and soft tissue   windows were generated. Coronal and sagittal reformations were generated. FINDINGS:  CT abdomen without demonstrates no abnormal calcifications. CT of the abdomen during arterial phase imaging demonstrates vague area of   enhancement in segment 4A most likely hepatic perfusion anomaly. No focal   lesions are identified in the spleen, pancreas, adrenal glands or kidneys. CT CHEST: Postsurgical changes are noted in the right breast. No mediastinal   or hilar adenopathy is identified. There is a tiny pericardial effusion. There is a tiny right pleural effusion    No pulmonary masses are identified. There is a 2 mm subpleural nodule right   middle lobe.         CT abdomen and pelvis: No focal lesions are identified in the liver, spleen,   pancreas, kidneys or adrenal glands. No retroperitoneal adenopathy is   identified. Bowel is not obstructed. No pelvic adenopathy is identified. Bony structures are unremarkable. IMPRESSION:  1. CT chest demonstrates no definite evidence of metastatic disease. Post   surgical changes noted right breast. There is a 2 mm subpleural nodule right   middle lobe. 2. CT abdomen and pelvis demonstrates no evidence of metastatic disease. Signing/Reading Doctor: Yanet Cain ([id number])    Approved: Yanet Cain ([id number])  May  6 2016  3:34PM                                    Lab Results  reviewed  Lab Results   Component Value Date/Time    WBC 5.1 10/27/2016 12:28 PM    HGB 11.3 10/27/2016 12:28 PM    HCT 36.1 10/27/2016 12:28 PM    PLATELET 742 25/72/7439 12:28 PM    MCV 81.7 10/27/2016 12:28 PM       Lab Results   Component Value Date/Time    Sodium 142 10/27/2016 12:28 PM    Potassium 3.8 10/27/2016 12:28 PM    Chloride 105 10/27/2016 12:28 PM    CO2 28 10/27/2016 12:28 PM    Anion gap 9 10/27/2016 12:28 PM    Glucose 93 10/27/2016 12:28 PM    BUN 10 10/27/2016 12:28 PM    Creatinine 0.71 10/27/2016 12:28 PM    BUN/Creatinine ratio 14 10/27/2016 12:28 PM    GFR est AA >60 10/27/2016 12:28 PM    GFR est non-AA >60 10/27/2016 12:28 PM    Calcium 9.6 10/27/2016 12:28 PM    AST (SGOT) 30 10/27/2016 12:28 PM    Alk. phosphatase 99 10/27/2016 12:28 PM    Protein, total 7.1 10/28/2016 02:48 PM    Albumin 3.5 10/27/2016 12:28 PM    Globulin 4.1 10/27/2016 12:28 PM    A-G Ratio 0.9 10/27/2016 12:28 PM    ALT (SGPT) 46 10/27/2016 12:28 PM     Assessment/Plan:     Geovanna Lopes is a 51 y.o. female and presents with Breast Cancer    CC  Breast cancer 1/16    DX   Encounter Diagnoses   Name Primary?  Malignant neoplasm of right breast, stage 2 ([address] Yes    Lobular breast cancer, right ([address]         STAGE:  2 ER+ HEr2 neg  mammoprint low risk     TREATMENT COURSE: right lumpectomy/ axillary LN dissection.   TC chemo then radiation  Tried arimadex and stopped 5/17 due to side effects. 1.  Stage 2 breast cancer ER+ HER2 neg mammoprint low risk luminal A s/p lumpectomy and axillary LN dissection which showed 0/10 LN +. Pt has not been here since 10/16 per her preference. Has been no show for PF visits  She completed adjuvant chemotherapy with TC x 4 on 8/18 and did well with chemo overall.    stopped radiation early 1/17. Pt was having side effects to arimadex and stopped it. Does not want to restart this due to joint symptoms. Reviewed risks, benefits and alternatives of tamoxifen today. Pt is agreeable to tamoxifen and will start after 2 more weeks off. Next mammo set up for 7/17. 2.  Postmenopausal Gyn follow-up. 3.  Arthritis in hands. Stable. 4.  Lack of libido. Per gyn. 5.  Works at a . Unsure if she will be able to keep working due to hand arthritis. We will stop disability if pt needs help with this. Call if questions. Follow-up in 3 months. ICD-10-CM ICD-9-CM    1. Malignant neoplasm of upper-outer quadrant of right female breast (HCC) C50.411 174.4    2. Breast cancer metastasized to axillary lymph node, right C50.911 174.9     C77.3 196.3    3. S/P lumpectomy, right breast Z90.11 V45.89    4. Arthritis M19.90 716.90    5. Essential hypertension I10 401.9    6. Hot flashes R23.2 782.62        Current Outpatient Prescriptions   Medication Sig    azithromycin (ZITHROMAX) 250 mg tablet     gabapentin (NEURONTIN) 300 mg capsule     metroNIDAZOLE (FLAGYL) 500 mg tablet     zolpidem (AMBIEN) 10 mg tablet     tamoxifen (NOLVADEX) 20 mg tablet Take 1 Tab by mouth daily. Indications: HORMONE RECEPTOR POSITIVE BREAST CANCER    cyanocobalamin 1,000 mcg tablet Take 1,000 mcg by mouth daily.  VENTOLIN HFA 90 mcg/actuation inhaler     citalopram (CELEXA) 40 mg tablet Take 40 mg by mouth nightly.     losartan (COZAAR) 25 mg tablet Take 1 Tab by mouth daily.    zolpidem CR (AMBIEN CR) 12.5 mg tablet TK 1 T PO QD HS PRN    gabapentin (NEURONTIN) 100 mg capsule Take 1 Cap by mouth three (3) times daily. No current facility-administered medications for this visit. continue present plan, call if any problems  Patient Instructions   Tamoxifen   This information is about a hormonal therapy used to treat breast cancer called tamoxifen. It should ideally be read with our general information about breast cancer or, if appropriate, secondary breast cancer. On this page     Tamoxifen   How it works   How it is taken   When it may be given   Preventative treatment   Possible side effects   Less common side effects   Long-term side effects   Additional information   Things to remember about tamoxifen tablets   References    Tamoxifen Back to top  Tamoxifen is an anti-oestrogen drug that was developed over 30 years ago. It is used widely to treat breast cancer and is occasionally used to treat some other cancers. Tamoxifen can also be used to treat or prevent side effects of breast tenderness and swelling in some men with prostate cancer . These side effects can happen as a result of other hormonal treatments. How it works Back to top  The way in which tamoxifen works is quite complicated and not yet fully understood. Its main function is as an anti-oestrogen drug. Many breast cancers rely on the female sex hormone oestrogen to grow. Hormone-sensitive breast cancer cells have proteins called receptors that the sex hormones attach to. They are known as 'oestrogen-receptor positive' (ER-positive) breast cancers. When oestrogen comes into contact with the receptors, it fits into them and stimulates the cancer cells to divide so that the tumour grows. Tamoxifen fits into the oestrogen receptor and blocks oestrogen from reaching the cancer cells. This means the cancer either grows more slowly or stops growing altogether.      How it is taken Back to top  Tamoxifen is a tablet that should be swallowed whole with a glass of water. The tablets come in different strengths: 10mg, 20mg and 40mg. The drug is manufactured under several different brand names and these may appear on the tablets. It's also available as a sugar free syrup for people who have difficulty swallowing tablets. Tamoxifen is usually prescribed as a single daily dose and this should be taken at the same time each day. You may prefer to take the tablet with food as it may make you feel sick and can leave a metallic taste in your mouth. It's best to find a convenient time for you and stick to it. When it may be given Back to [facility]  Your doctor will take into account a number of different factors when planning your treatment. The breast cancer cells will be tested to see if they have have oestrogen receptors (ER) and progesterone receptors (DE). Tamoxifen is only effective for people who have hormone-sensitive breast cancers. Tamoxifen may be given after surgery for early breast cancer, to reduce the risk of the cancer coming back. This is known as adjuvant therapy. You may be given tamoxifen for five years. Some people may have tamoxifen for two or three years and then change to a different type of hormonal therapy known as an aromatase inhibitor. Aromatase inhibitors include anastrozole (Arimidex®), exemestane (Aromasin®) and letrozole (Femara®). Occasionally tamoxifen is used before surgery, to shrink a large breast cancer. This means that a lumpectomy (removal of the lump) may be possible, rather than removal of the whole breast (a mastectomy). Tamoxifen can also be used to control a cancer that has come back or spread to other parts of the body. Preventative treatment Back to top  Research has been carried out to see if tamoxifen can prevent breast cancer in women who have a high risk of developing the disease.  'High risk' is defined as having one or more close relatives (mother or sister) who had breast cancer before they were 48. The results from some research trials show that tamoxifen helps to prevent ER-positive breast cancers in high risk women, but not ER-negative breast cancers. Other trials are still ongoing. If you think that a member of your family may be at high risk of breast cancer, they should discuss the possible monitoring and treatment options with their doctor. Possible side effects Back to top  Each person's reaction to any medicine is different. Some people have very few side effects while others may experience more. Very rarely, if the side effects are severe, you may have to stop taking tamoxifen and a different drug may be prescribed. You may have some of the following side effects, to varying degrees:   Hot flushes and sweats These are a common side effect of tamoxifen. They may gradually lessen over the first few months but some people continue to have them for as long as they take tamoxifen. There are a number of ways to help reduce or control hot flushes and sweats. Some people find it helps to avoid or cut down on tea, coffee, nicotine and alcohol. Research suggests that some types of antidepressants may be helpful in controlling this side effect. Your doctor or nurse can discuss this with you. Some people may find complementary therapies such as acupuncture helpful. Your GP may be able to give you details about having these on the NHS. If you find your own complementary therapist, make sure that they are properly qualified and registered. If the hot flushes are really troublesome, you can discuss possible treatments with your doctor. They may be able to prescribe a different hormonal therapy instead. Sometimes the hot flushes continue for a long time after you have stopped taking tamoxifen. You can read more about treatments for menopausal symptoms like hot flushes in our information about Breast cancer and menopausal symptoms.    Nausea and indigestion Feeling sick (nausea) and indigestion are fairly common, but can often be relieved by taking your tablets with food or milk, or at night. Although nausea is quite common initially, it usually wears off after a few weeks. Weight gain Some women notice that they put on weight while taking tamoxifen. This may be due to water retention. Change in periods Women who have not yet reached the menopause may notice that their monthly periods change - they may become irregular, lighter or sometimes stop altogether. Some women also notice an increase in vaginal discharge and itching of the area around the vagina (the vulva). Leg cramps Some women get leg cramps with tamoxifen. Walking may stretch the muscle and help with this. Let your doctor know if leg cramps are a problem. If your leg becomes red, hot or swollen (see blood clots, below), tell your doctor immediately. Less common side effects Back to top  Depression, tiredness and dizziness Some women feel depressed while taking tamoxifen, but this may be due to other causes. Headaches Some people affected by migraine notice a change in the pattern of their headaches. Blood clots (thrombosis) In post-menopausal women, tamoxifen can slightly increase the risk of blood clots and strokes. If you have any pain, warmth, swelling or tenderness in an arm or leg (or any chest pain), you must tell your doctor straight away. Vision problems Blurred or reduced vision is very rare, but any changes in your eyesight should be reported to your doctor. Voice changes This has been reported by some people. Professional singers may want to seek help and advice from their doctor. Tumour flare People who are prescribed tamoxifen for cancer that has spread to the bones, may experience something called tumour flare when they start taking tamoxifen.  This can cause a raised level of calcium in the blood (hypercalcaemia), with symptoms of nausea, vomiting and thirst. Very occasionally, a short stay in hospital is needed until the calcium levels have been reduced. Rare side effects of tamoxifen These may include mild allergic reactions, such as skin rashes and hair loss (this will grow back later). Always let your doctor or nurse know about any side effects you have. There are usually ways in which they can be controlled or improved. Long-term side effects Back to top  Studies have shown that post-menopausal women who take tamoxifen over a long period of time may have a very slightly increased risk of developing cancer of the lining of the womb (endometrial cancer). However, this small risk is generally outweighed by the benefits of taking tamoxifen. If detected early, treatment for endometrial cancer is usually very successful. An early warning sign is abnormal vaginal bleeding, although this is often caused by a non-cancerous condition such as polyps. If you have any abnormal vaginal bleeding you should let your doctor know. Some women may have regular gynaecological check-ups. Additional information Back to top  Contraception Tamoxifen should not be taken during pregnancy as it may harm the developing baby. Although it can affect a woman's periods, tamoxifen is not a contraceptive. It's important to use an effective, non-hormonal form of contraception during treatment. Fertility Women who haven't been through their menopause may still become pregnant when they've finished treatment with tamoxifen. Doctors usually advise you to wait for a few months after tamoxifen treatment is over before you try to get pregnant. Talk to your doctor first if you are thinking of getting pregnant. Risk of blood clots If you have a history of blood clots or deep vein thrombosis (DVT) let your doctor know, as tamoxifen may not be suitable for you.    Interaction with warfarin Tamoxifen can increase the effect of the drug warfarin, which is used to thin the blood in people prone to blood clots. If you are taking warfarin, let your doctor know straight away. CYP2D6 inhibitors There is some research that suggests some drugs - including the antidepressants paroxetine (Seroxat®) and fluoxetine (Prozac®) - may cause tamoxifen to be less effective, but this isn't certain. Tell your doctors about any other medicines you are taking. Bone loss Tamoxifen may help to prevent bone loss in women who have been through their menopause. In some women it may reduce the risk of thinning of the bone (osteoporosis). Heart disease Tamoxifen can lower the level of fat (lipids) in your blood (high levels of fat contribute to heart disease). But research doesn't show that tamoxifen reduces the risk of heart disease. Things to remember about tamoxifen tablets Back to top  Tamoxifen may interact with other medicines. Tell your doctor about any medicines you are taking, including non-prescribed drugs such as complementary therapies, vitamins and herbal drugs. Keep the tablets in a safe place, out of reach of children. Keep the tablets in the original packaging/container and store them at room temperature, away from moisture, direct heat and sunlight. Store the tablets in the original packaging/container. They should be kept at room temperature, away from moisture and direct heat. If your doctor decides to stop the treatment, return any remaining tablets to the pharmacist. Marea Ricky not flush them down the toilet or throw them away. Don't worry if you forget to take your tablet. The levels of the drug in your blood will not change very much, but try not to miss more than one or two tablets in a row. Remember to get a new prescription a few weeks before you run out of tablets. Make sure you have plenty for holidays. References Back to top  This section has been compiled using information from a number of reliable sources including:   Enoch. Devora Hendrickson: The Complete Drug Reference.  36th edition. 2009. [employer]. [employer]. 59th edition. 2010. [employer] and [address]. [employer] www.[employer]/online/index. do (accessed September 2010)   electronic Medicines Compendium [facility]). www.[employer]. org.uk (accessed September 2010)   Nydia MORALES, et al. Selective seratonin reuptake inhibitors and breast cancer mortality in women receiving tamoxifen: a population based cohort study. BMJ. 2010. 340: O225. For further references, please see the general bibliography. Follow-up Disposition:  Return in about 2 months (around 7/19/2017).     Joel Reese DO

## 2017-05-31 ENCOUNTER — TELEPHONE (OUTPATIENT)
Dept: SURGERY | Age: 47
End: 2017-05-31

## 2017-05-31 NOTE — TELEPHONE ENCOUNTER
Called today stating that she had gotten a letter approving her breast MRI. Thinks this is good through the end of July. I advised her that I spoke with Dr. Jorge Yousif, and he recommended getting the mammogram on 7/11/17 and then make a plan after that time. I told her I would write her on my calendar to make sure that we looked at her mammogram so Dr. Jorge Yousif can decide whether or not he would like to go forward with a breast MRI. She has not had any spontaneous nipple discharge at all. Has not been squeezing, so is not sure if she has any discharge when she expresses it. She was appreciative of the follow-up call.

## 2017-07-11 ENCOUNTER — HOSPITAL ENCOUNTER (OUTPATIENT)
Dept: MAMMOGRAPHY | Age: 47
Discharge: HOME | End: 2017-07-11
Attending: SURGERY
Payer: COMMERCIAL

## 2017-07-11 ENCOUNTER — OFFICE VISIT (OUTPATIENT)
Dept: SURGERY | Age: 47
End: 2017-07-11

## 2017-07-11 VITALS
HEART RATE: 82 BPM | BODY MASS INDEX: 41.99 KG/M2 | HEIGHT: 63 IN | SYSTOLIC BLOOD PRESSURE: 132 MMHG | WEIGHT: 237 LBS | DIASTOLIC BLOOD PRESSURE: 82 MMHG

## 2017-07-11 DIAGNOSIS — C50.911 MALIGNANT NEOPLASM OF RIGHT BREAST, STAGE 2 (HCC): Primary | ICD-10-CM

## 2017-07-11 DIAGNOSIS — C50.919 BREAST CANCER (HCC): ICD-10-CM

## 2017-07-11 DIAGNOSIS — Z98.890 S/P LUMPECTOMY, RIGHT BREAST: ICD-10-CM

## 2017-07-11 DIAGNOSIS — Z85.3 HISTORY OF BREAST CANCER: Primary | ICD-10-CM

## 2017-07-11 PROCEDURE — 77066 DX MAMMO INCL CAD BI: CPT

## 2017-07-11 RX ORDER — BUSPIRONE HYDROCHLORIDE 5 MG/1
15 TABLET ORAL
COMMUNITY
Start: 2017-05-22

## 2017-07-11 NOTE — PROGRESS NOTES
HISTORY OF PRESENT ILLNESS  Marylen Mulberry is a 51 y.o. female.   HPI       ROS    Physical Exam    ASSESSMENT and PLAN  {ASSESSMENT/PLAN:05777}

## 2017-07-11 NOTE — PATIENT INSTRUCTIONS
Breast Self-Exam: Care Instructions  Your Care Instructions  A breast self-exam is when you check your breasts for lumps or changes. This regular exam helps you learn how your breasts normally look and feel. Most breast problems or changes are not because of cancer. Breast self-exam is not a substitute for a mammogram. Having regular breast exams by your doctor and regular mammograms improve your chances of finding any problems with your breasts. Some women set a time each month to do a step-by-step breast self-exam. Other women like a less formal system. They might look at their breasts as they brush their teeth, or feel their breasts once in a while in the shower. If you notice a change in your breast, tell your doctor. Follow-up care is a key part of your treatment and safety. Be sure to make and go to all appointments, and call your doctor if you are having problems. Its also a good idea to know your test results and keep a list of the medicines you take. How do you do a breast self-exam?  · The best time to examine your breasts is usually one week after your menstrual period begins. Your breasts should not be tender then. If you do not have periods, you might do your exam on a day of the month that is easy to remember. · To examine your breasts:  ¨ Remove all your clothes above the waist and lie down. When you are lying down, your breast tissue spreads evenly over your chest wall, which makes it easier to feel all your breast tissue. ¨ Use the pads--not the fingertips--of the 3 middle fingers of your left hand to check your right breast. Move your fingers slowly in small coin-sized circles that overlap. ¨ Use three levels of pressure to feel of all your breast tissue. Use light pressure to feel the tissue close to the skin surface. Use medium pressure to feel a little deeper. Use firm pressure to feel your tissue close to your breastbone and ribs.  Use each pressure level to feel your breast tissue before moving on to the next spot. ¨ Check your entire breast, moving up and down as if following a strip from the collarbone to the bra line, and from the armpit to the ribs. Repeat until you have covered the entire breast.  ¨ Repeat this procedure for your left breast, using the pads of the 3 middle fingers of your right hand. · To examine your breasts while in the shower:  ¨ Place one arm over your head and lightly soap your breast on that side. ¨ Using the pads of your fingers, gently move your hand over your breast (in the strip pattern described above), feeling carefully for any lumps or changes. ¨ Repeat for the other breast.  · Have your doctor inspect anything you notice to see if you need further testing. Where can you learn more? Go to http://pascual-senthil.info/. Enter P148 in the search box to learn more about \"Breast Self-Exam: Care Instructions. \"  Current as of: July 26, 2016  Content Version: 11.3  © 9923-2859 [employer]. Care instructions adapted under license by [employer] (which disclaims liability or warranty for this information). If you have questions about a medical condition or this instruction, always ask your healthcare professional. [place] [address] any warranty or liability for your use of this information.

## 2017-07-11 NOTE — PROGRESS NOTES
HISTORY OF PRESENT ILLNESS  Paige Mattson is a 51 y.o. female. HPI  ESTABLISHED patient here today for follow up RIGHT breast cancer. Has LEFT breast pain with touch. Denies any other breast problems at this time. Currently taking Tamoxifen, but has not taken it for one week due to severe side effects of joint pain and hot flashes. History of breast cancer-    1/29/2016: RIGHT core bx of IMC with [facility]. ER+100%/[place]+90% Her2- ([facility]: ER+80%/[place]+20%)  3/31/2016: RIGHT lumpectomy with SNBx of 1.9 cm, gr2 ILC with DCIS and LCIS. 2/2 nodes involved. Clear margins. pT1c pN1a Mx.  5/17/2016: RIGHT ALND. 0/10 nodes involved. 8/18/2016: completed adjuvant chemo (TC x4).  9/23/2016-01/10/2017: completed all but 8 treatments of XRT (stopped midway and started back again). Followed by Dr. Pete Sun. Tried Arimidex, but stopped. Started on Tamoxifen. Followed by Dr. Syed Day. OB History     Obstetric Comments    Menarche:  8. LMP: 38.  # of Children:  3. Age at Delivery of First Child:  20.   Hysterectomy/oophorectomy:  YES/NO. Breast Bx:  Yes,. Hx of Breast Feeding:  Yes. BCP:  Yes, in the past. Hormone therapy:  Yes.              Past Surgical History:   Procedure Laterality Date    BREAST SURGERY PROCEDURE UNLISTED Right 5/17/16    RIGHT axillary lymph node dissection and PAC insertion    HX BREAST BIOPSY Right     SEVERAL TIMES    HX BREAST LUMPECTOMY Right 3/31/2016    RIGHT BREAST LUMPECTOMY, RIGHT SENTINEL NODE BIOPSY WITH ULTRASOUND performed by Milena Reyes MD at [address] HX CHOLECYSTECTOMY      HX GI      COLONOSCOPY    HX GYN      cesarian x 3    HX HYSTERECTOMY      Still has ovaries    HX ORTHOPAEDIC Left     ARM FX WITH PINS    HX TONSILLECTOMY      HX UROLOGICAL      CYSTOSCOPY    HX UROLOGICAL      BLADDER SLING    KAREN STEREO  BX BREAST RT 1ST LESION W/CLIP AND SPECIMEN Right 2013    benign    KAREN US BX BREAST RT 1ST LESION W/CLIP AND SPECIMEN Right 01/29/2016    Ca in situ     No FH of breast or ovarian cancer. Recent imaging-  Bilateral diagnostic mammogram today, 7/11/17- BIRADS 2  FINDINGS:     Postsurgical changes in the upper, outer quadrant of the right breast with  BioZorb present. No new visualized mass, suspicious microcalcification or architectural  distortion. IMPRESSION:     1. BI-RADS Category 2 - Benign findings. RECOMMENDATION:    Routine surveillance of the diagnostic mammogram in one year. ROS    Physical Exam   Constitutional: She is oriented to person, place, and time. She appears well-developed and well-nourished. Pulmonary/Chest: Right breast exhibits no inverted nipple, no mass, no nipple discharge, no skin change (skin darkening and thickening post radiation) and no tenderness. Left breast exhibits no inverted nipple, no mass, no nipple discharge, no skin change and no tenderness. Breasts are symmetrical.   Lymphadenopathy:     She has no cervical adenopathy. She has no axillary adenopathy. Right: No supraclavicular adenopathy present. Left: No supraclavicular adenopathy present. Neurological: She is alert and oriented to person, place, and time. Skin: Skin is warm, dry and intact. Chest and breasts examined   Psychiatric: She has a normal mood and affect. Her speech is normal and behavior is normal.     Visit Vitals    /82    Pulse 82    Ht 5' 3\" (1.6 m)    Wt 237 lb (107.5 kg)    LMP 07/04/2014 (Approximate)    BMI 41.98 kg/m2     ASSESSMENT and PLAN  Encounter Diagnoses   Name Primary?  Malignant neoplasm of right breast, stage 2 ([address] Yes    S/P lumpectomy, right breast      Normal exam and imaging with no evidence of local recurrence. The patient has not tolerated Arimidex or tamoxifen due to side effects of joint pain, mood swings and hot flashes. She has follow-up with Dr. Pepper Course this month to discuss this. She will plan to RTC here in 6 months. I anticipate a BDmammogram in 1 year.

## 2017-07-18 ENCOUNTER — OFFICE VISIT (OUTPATIENT)
Dept: ONCOLOGY | Age: 47
End: 2017-07-18

## 2017-07-18 VITALS
SYSTOLIC BLOOD PRESSURE: 117 MMHG | HEART RATE: 110 BPM | WEIGHT: 240.2 LBS | BODY MASS INDEX: 42.56 KG/M2 | HEIGHT: 63 IN | OXYGEN SATURATION: 95 % | RESPIRATION RATE: 16 BRPM | TEMPERATURE: 97.8 F | DIASTOLIC BLOOD PRESSURE: 83 MMHG

## 2017-07-18 DIAGNOSIS — Z78.0 POSTMENOPAUSAL: ICD-10-CM

## 2017-07-18 DIAGNOSIS — Z98.890 S/P LUMPECTOMY, RIGHT BREAST: ICD-10-CM

## 2017-07-18 DIAGNOSIS — M19.90 ARTHRITIS: ICD-10-CM

## 2017-07-18 DIAGNOSIS — R63.5 WEIGHT GAIN: ICD-10-CM

## 2017-07-18 DIAGNOSIS — C50.411 MALIGNANT NEOPLASM OF UPPER-OUTER QUADRANT OF RIGHT FEMALE BREAST (HCC): Primary | ICD-10-CM

## 2017-07-18 DIAGNOSIS — R23.2 HOT FLASHES: ICD-10-CM

## 2017-07-18 RX ORDER — EXEMESTANE 25 MG/1
25 TABLET ORAL DAILY
Qty: 30 TAB | Refills: 2 | Status: SHIPPED | OUTPATIENT
Start: 2017-07-18 | End: 2017-10-16

## 2017-07-18 NOTE — PROGRESS NOTES
HEME/ONC PROGRESS NOTE       Ruiz Soto is a 51 y.o. 1970 female and presents with Breast Cancer    CC  Breast cancer 1/16    HPI: Pt is here for f/u breast cancer off adjuvant tamoxifen. Could not tolerate AI. Having bad hot flashes and wants to discuss side effects. C/o moodiness/ sweats/ achiness from tamoxifen. Pt has been off tamoxifen for about 2 weeks. Pt is having trouble working due to side effects. C/o weight gain and not eating much. Willing to meet with nutrition. Wants to have possible surgery for weight loss. Seeing dr julian for this. Last visit:  Fu after stopping AI due to side effects after taking it for a few months. C/o tenderness to both breasts and saw breast surgery for this. C/o mood swings/ joint pains stiffness/ hot flashes bad. Been off AI for 2 weeks and side effects getting better. Sexual side effects better off AI also. Pt not sure if she wants to try another pill. DX   Encounter Diagnoses   Name Primary?  Malignant neoplasm of upper-outer quadrant of right female breast ([facility] [address] Yes    S/P lumpectomy, right breast     Arthritis     Hot flashes     Weight gain     Postmenopausal         STAGE:  2 ER+ HEr2 neg  mammoprint low risk     TREATMENT COURSE: arimadex stopped due to side effects. Tamoxifen stopped 7/17 due to side effects. right lumpectomy/  Axillary dissection. TC chemo completed 8/2016. radiation done 1/16.      Past Medical History:   Diagnosis Date    Arthritis     HANDS AND KNEES    Asthma     Breast cancer ([facility] [address] 03/31/2017    Carcinoma in situ    Cancer ([facility] [address] 2016    RIGHT breast cancer    Depression     GERD (gastroesophageal reflux disease)     Heart murmur     Hypertension     Neuropathy (HCC)     PUD (peptic ulcer disease)     S/P radiation therapy 2016    and chemo finished all 1/2017    Vertigo      Past Surgical History:   Procedure Laterality Date    BREAST SURGERY PROCEDURE UNLISTED Right 5/17/16    RIGHT axillary lymph node dissection and PAC insertion    HX BREAST BIOPSY Right     SEVERAL TIMES    HX BREAST LUMPECTOMY Right 3/31/2016    RIGHT BREAST LUMPECTOMY, RIGHT SENTINEL NODE BIOPSY WITH ULTRASOUND performed by [facility]am Martin [facility] at [facility] HX CHOLECYSTECTOMY      HX GI      COLONOSCOPY    HX GYN      cesarian x 3    HX HYSTERECTOMY      Still has ovaries    HX ORTHOPAEDIC Left     ARM FX WITH PINS    HX TONSILLECTOMY      HX UROLOGICAL      CYSTOSCOPY    HX UROLOGICAL      BLADDER SLING    KAREN STEREO  BX BREAST RT 1ST LESION W/CLIP AND SPECIMEN Right 2013    benign    KAREN US BX BREAST RT 1ST LESION W/CLIP AND SPECIMEN Right 01/29/2016    Ca in situ     Social History     Social History    Marital status:      Spouse name: N/A    Number of children: N/A    Years of education: N/A     Social History Main Topics    Smoking status: Never Smoker    Smokeless tobacco: Never Used    Alcohol use 0.0 oz/week     0 Standard drinks or equivalent per week      Comment: occasionally a glass of wine    Drug use: No    Sexual activity: Yes     Partners: Male     Birth control/ protection: None     Other Topics Concern    None     Social History Narrative    Recently moved from [place], they have been here since April. . Family History   Problem Relation Age of Onset    Hypertension Mother     Kidney Disease Mother      ON DIALYSIS    Heart Disease Mother     No Known Problems Father     Asthma Son     Asthma Daughter     No Known Problems Son     Anesth Problems Neg Hx        Current Outpatient Prescriptions   Medication Sig Dispense Refill    exemestane (AROMASIN) 25 mg tablet Take 1 Tab by mouth daily for 90 days. 30 Tab 2    gabapentin (NEURONTIN) 300 mg capsule daily.  zolpidem (AMBIEN) 10 mg tablet       tamoxifen (NOLVADEX) 20 mg tablet Take 1 Tab by mouth daily.  Indications: HORMONE RECEPTOR POSITIVE BREAST CANCER 30 Tab 3    cyanocobalamin 1,000 mcg tablet Take 1,000 mcg by mouth daily.  VENTOLIN HFA 90 mcg/actuation inhaler       citalopram (CELEXA) 40 mg tablet Take 40 mg by mouth nightly.  losartan (COZAAR) 25 mg tablet Take 1 Tab by mouth daily. 90 Tab 1    busPIRone (BUSPAR) 5 mg tablet          Allergies   Allergen Reactions    Adhesive Tape-Silicones Rash and Itching    Darvocet A500 [Propoxyphene N-Acetaminophen] Nausea Only and Other (comments)     headache    Diazepam Other (comments)     Not an allergy, just does not work for her at all.  Hydrocodone Nausea and Vomiting and Vertigo    Percocet [Oxycodone-Acetaminophen] Nausea Only and Other (comments)     headache    Tylenol-Codeine #2 Itching, Nausea Only and Other (comments)     HEADACHES         Review of Systems    A comprehensive review of systems was negative except for: per HPI     Objective:  Visit Vitals    /83    Pulse (!) 110    Temp 97.8 °F (36.6 °C) (Oral)    Resp 16    Ht 5' 3\" (1.6 m)    Wt 240 lb 3.2 oz (109 kg)    LMP 07/04/2014 (Approximate)    SpO2 95%    BMI 42.55 kg/m2         Physical Exam:   General appearance - alert, well appearing, and in no distress and oriented to person, place, and time  Mental status - alert, oriented to person, place, and time, normal mood, behavior, speech, dress, motor activity, and thought processes  EYE-conj clear  Neck supple  Lungs - Clear to auscultation bilaterally  CV - Regular rate and rhythm  Abdomen - Round, soft, non-tender. Ext-no edema  Skin-Warm and dry. Neuro -alert, oriented, normal speech, no focal findings or movement disorder noted. Breast - no masses palpable b/l    Diagnostic Imaging   reviewed  Results for orders placed during the hospital encounter of 08/24/16   XR CHEST PA LAT    Narrative Indication:  fever     Exam: PA and lateral views of the chest.    Direct comparison is made to prior CXR dated June 7, 2016.     Findings: Cardiomediastinal silhouette is within normal limits. Central venous  port catheter is unchanged in position. Lungs are clear bilaterally. Pleural  spaces are normal. Osseous structures are intact. Impression IMPRESSION: No acute cardiopulmonary disease. Results for orders placed during the hospital encounter of 05/06/16   CT ABDOMEN W WO CONT AND PELVIS W CONT    Narrative **Final Report**       ICD Codes / Adm. Diagnosis: 174.9  174.9 / Malignant neoplasm of breast (    Malignant neoplasm of breast  Examination:  CT ABD W WO CON PELVIS W CON  - [id number] - May  6 2016  2:44PM  Accession No:  [id number]  Reason:        REPORT:  EXAM:  CT THORAX W CON, CT ABD W WO CON PELVIS W CON    INDICATION:   breast cancer staging    COMPARISON: None. CONTRAST:  90 mL of Isovue-370     TECHNIQUE: Helical CT was performed of the abdomen without IV contrast and   with IV contrast during arterial administration. Multislice helical CT was performed from the thoracic inlet to the symphysis   pubis during uneventful rapid bolus intravenous contrast administration. Contiguous 5 mm axial images were reconstructed and lung and soft tissue   windows were generated. Coronal and sagittal reformations were generated. FINDINGS:  CT abdomen without demonstrates no abnormal calcifications. CT of the abdomen during arterial phase imaging demonstrates vague area of   enhancement in segment 4A most likely hepatic perfusion anomaly. No focal   lesions are identified in the spleen, pancreas, adrenal glands or kidneys. CT CHEST: Postsurgical changes are noted in the right breast. No mediastinal   or hilar adenopathy is identified. There is a tiny pericardial effusion. There is a tiny right pleural effusion    No pulmonary masses are identified. There is a 2 mm subpleural nodule right   middle lobe. CT abdomen and pelvis: No focal lesions are identified in the liver, spleen,   pancreas, kidneys or adrenal glands.  No retroperitoneal adenopathy is identified. Bowel is not obstructed. No pelvic adenopathy is identified. Bony structures are unremarkable. IMPRESSION:  1. CT chest demonstrates no definite evidence of metastatic disease. Post   surgical changes noted right breast. There is a 2 mm subpleural nodule right   middle lobe. 2. CT abdomen and pelvis demonstrates no evidence of metastatic disease. Signing/Reading Doctor: Sera Medrano ([id number])    Approved: Sera Medrano ([id number])  May  6 2016  3:34PM                                    Lab Results  reviewed  Lab Results   Component Value Date/Time    WBC 5.1 10/27/2016 12:28 PM    HGB 11.3 10/27/2016 12:28 PM    HCT 36.1 10/27/2016 12:28 PM    PLATELET 288 00/93/2223 12:28 PM    MCV 81.7 10/27/2016 12:28 PM       Lab Results   Component Value Date/Time    Sodium 142 10/27/2016 12:28 PM    Potassium 3.8 10/27/2016 12:28 PM    Chloride 105 10/27/2016 12:28 PM    CO2 28 10/27/2016 12:28 PM    Anion gap 9 10/27/2016 12:28 PM    Glucose 93 10/27/2016 12:28 PM    BUN 10 10/27/2016 12:28 PM    Creatinine 0.71 10/27/2016 12:28 PM    BUN/Creatinine ratio 14 10/27/2016 12:28 PM    GFR est AA >60 10/27/2016 12:28 PM    GFR est non-AA >60 10/27/2016 12:28 PM    Calcium 9.6 10/27/2016 12:28 PM    AST (SGOT) 30 10/27/2016 12:28 PM    Alk. phosphatase 99 10/27/2016 12:28 PM    Protein, total 7.1 10/28/2016 02:48 PM    Albumin 3.5 10/27/2016 12:28 PM    Globulin 4.1 10/27/2016 12:28 PM    A-G Ratio 0.9 10/27/2016 12:28 PM    ALT (SGPT) 46 10/27/2016 12:28 PM     Assessment/Plan:     Rebecca Paiz is a 51 y.o. female and presents with Breast Cancer    CC  Breast cancer 1/16    DX   Encounter Diagnoses   Name Primary?  Malignant neoplasm of right breast, stage 2 ([facility] [address] Yes    Lobular breast cancer, right ([facility] [address]         STAGE:  2 ER+ HEr2 neg  mammoprint low risk     TREATMENT COURSE: right lumpectomy/ axillary LN dissection.   TC chemo then radiation  Tried arimadex and stopped 5/17 due to side effects. 1.  Stage 2 breast cancer ER+ HER2 neg mammoprint low risk luminal A s/p lumpectomy and axillary LN dissection which showed 0/10 LN +. Pt has not been here since 10/16 per her preference. Has been no show for PF visits  She completed adjuvant chemotherapy with TC x 4 on 8/18 and did well with chemo overall.    stopped radiation early 1/17. Pt was having side effects to arimadex and stopped it. Did not want to restart this due to joint symptoms. Pt was on tamoxifen and stopped this also due to side effects 7/17. Pt wants to try another AI. Going to [place] and wants to wait until after this trip to start. Reviewed risks, benefits and alternatives of aromasin today. Pt is agreeable to aromasin and will start after 2 more weeks off.     good mammo 7/17. 2.  Postmenopausal Gyn follow-up. 3.  Arthritis in hands. Stable. 4.  Lack of libido. Per gyn. 5.  Works at a . Having some trouble working. Encouraged to try another pill with perhaps less side effects. Call if questions. Follow-up in 3 months. ICD-10-CM ICD-9-CM    1. Malignant neoplasm of upper-outer quadrant of right female breast (HCC) C50.411 174.4    2. S/P lumpectomy, right breast Z90.11 V45.89    3. Arthritis M19.90 716.90    4. Hot flashes R23.2 782.62    5. Weight gain R63.5 783.1    6. Postmenopausal Z78.0 V49.81        Current Outpatient Prescriptions   Medication Sig    exemestane (AROMASIN) 25 mg tablet Take 1 Tab by mouth daily for 90 days.  gabapentin (NEURONTIN) 300 mg capsule daily.  zolpidem (AMBIEN) 10 mg tablet     tamoxifen (NOLVADEX) 20 mg tablet Take 1 Tab by mouth daily. Indications: HORMONE RECEPTOR POSITIVE BREAST CANCER    cyanocobalamin 1,000 mcg tablet Take 1,000 mcg by mouth daily.  VENTOLIN HFA 90 mcg/actuation inhaler     citalopram (CELEXA) 40 mg tablet Take 40 mg by mouth nightly.     losartan (COZAAR) 25 mg tablet Take 1 Tab by mouth daily.    busPIRone (BUSPAR) 5 mg tablet      No current facility-administered medications for this visit. continue present plan, call if any problems  Patient Instructions   Exemestane  (eks-uh-mess-tane)    Trade/other name(s): Aromasin  Why would this drug be used? Exemestane is used to treat breast cancer in postmenopausal women. How does this drug work? Exemestane is part of a general group of drugs called aromatase inhibitors. It keeps the body from making estrogen without affecting other hormones. Breast cancers that require estrogen to grow may stop growing or even shrink. Before taking this medicine  Tell your doctor  If you are allergic to anything, including medicines, dyes, additives, or foods. If you have any serious medical conditions, such as kidney disease or liver disease (including cirrhosis). If you think you might want to have children in the future. This drug has only been tested in women who have gone through menopause, and it may reduce fertility in those who have not. It may also reduce fertility if used in men. Talk with your doctor about the possible risk with this drug and the options that may preserve your ability to have children. If you are pregnant, trying to get pregnant, or if there is any chance of pregnancy. There may be an increased risk of harm to the fetus if a woman takes this drug during pregnancy. If you are breast-feeding. It is not known whether this drug passes into breast milk. If it does, it could harm the baby. About any other prescription or over-the-counter medicines you are taking, including vitamins and herbs. In fact, keeping a written list of each of these medicines (including the doses of each and when you take them) with you in case of emergency may help prevent complications if you get sick. Interactions with other drugs  Medicines that contain estrogen may reduce the action of exemestane.    Some medicines and herbs can cause exemestane to be removed more quickly from the body, such as phenobarbital, phenytoin, carbamazepine, rifampin, and Debbie's wort. Your doctor may need to adjust your exemestane dose. Check with your doctor, nurse, or pharmacist about other medicines, vitamins, herbs, and supplements, and whether alcohol can cause problems with this medicine. Interactions with foods  No serious interactions with food are known at this time. Check with your doctor, nurse, or pharmacist about whether foods may be a problem. Tell all the doctors, dentists, nurses, and pharmacists you visit that you are taking this drug. How is this drug taken or given? Exemestane is given in pill form once a day after eating a meal. The dose is usually the same for all adult patients. Take this drug exactly as directed by your doctor. If you do not understand the instructions, ask your doctor or nurse to explain them to you. Store the pills in a tightly closed container away from heat and moisture and out of the reach of children and pets. Precautions  Tell your doctor or nurse if you have severe hot flashes, trouble sleeping, or feel depressed. There is much that can be done to help with these side effects. Talk to your doctor or nurse about ways to cope with any of these problems. It is important to keep taking this drug, even if you feel well. If you are bothered by side effects, talk to your doctor or nurse to find out if the problems are serious. Many side effects can be managed with help from your doctor. Possible side effects  You will probably not have most of the following side effects, but if you have any talk to your doctor or nurse. They can help you understand the side effects and cope with them.    Common  tiredness (fatigue)   swelling of hands, feet, or ankles  Less common  hot flashes*   pain at tumor site   nausea   depression*   bone or joint pain   thinning hair   trouble sleeping   diarrhea  Rare  increased appetite weight gain   pain   high blood pressure   increased sweating   increased osteoporosis (bone thinning)  *See \"Precautions\" section for more detailed information. There are some other side effects not listed above that can also occur in some patients. Tell your doctor or nurse if you develop these or any other problems. FDA approval  Yes - first approved in 1999. Disclaimer: This information does not cover all possible uses, actions, precautions, side effects, or interactions. It is not intended as medical advice, and should not be relied upon as a substitute for talking with your doctor, who is familiar with your medical needs. Last Medical Review: 12/02/2009  Last Revised: 12/02/2009        Follow-up Disposition:  Return in about 3 months (around 10/18/2017).     Idalia Felix, DO

## 2017-07-18 NOTE — PATIENT INSTRUCTIONS
Exemestane  (eks-uh-mess-tane)    Trade/other name(s): Aromasin  Why would this drug be used? Exemestane is used to treat breast cancer in postmenopausal women. How does this drug work? Exemestane is part of a general group of drugs called aromatase inhibitors. It keeps the body from making estrogen without affecting other hormones. Breast cancers that require estrogen to grow may stop growing or even shrink. Before taking this medicine  Tell your doctor  If you are allergic to anything, including medicines, dyes, additives, or foods. If you have any serious medical conditions, such as kidney disease or liver disease (including cirrhosis). If you think you might want to have children in the future. This drug has only been tested in women who have gone through menopause, and it may reduce fertility in those who have not. It may also reduce fertility if used in men. Talk with your doctor about the possible risk with this drug and the options that may preserve your ability to have children. If you are pregnant, trying to get pregnant, or if there is any chance of pregnancy. There may be an increased risk of harm to the fetus if a woman takes this drug during pregnancy. If you are breast-feeding. It is not known whether this drug passes into breast milk. If it does, it could harm the baby. About any other prescription or over-the-counter medicines you are taking, including vitamins and herbs. In fact, keeping a written list of each of these medicines (including the doses of each and when you take them) with you in case of emergency may help prevent complications if you get sick. Interactions with other drugs  Medicines that contain estrogen may reduce the action of exemestane. Some medicines and herbs can cause exemestane to be removed more quickly from the body, such as phenobarbital, phenytoin, carbamazepine, rifampin, and Debbie's wort. Your doctor may need to adjust your exemestane dose.   Check with your doctor, nurse, or pharmacist about other medicines, vitamins, herbs, and supplements, and whether alcohol can cause problems with this medicine. Interactions with foods  No serious interactions with food are known at this time. Check with your doctor, nurse, or pharmacist about whether foods may be a problem. Tell all the doctors, dentists, nurses, and pharmacists you visit that you are taking this drug. How is this drug taken or given? Exemestane is given in pill form once a day after eating a meal. The dose is usually the same for all adult patients. Take this drug exactly as directed by your doctor. If you do not understand the instructions, ask your doctor or nurse to explain them to you. Store the pills in a tightly closed container away from heat and moisture and out of the reach of children and pets. Precautions  Tell your doctor or nurse if you have severe hot flashes, trouble sleeping, or feel depressed. There is much that can be done to help with these side effects. Talk to your doctor or nurse about ways to cope with any of these problems. It is important to keep taking this drug, even if you feel well. If you are bothered by side effects, talk to your doctor or nurse to find out if the problems are serious. Many side effects can be managed with help from your doctor. Possible side effects  You will probably not have most of the following side effects, but if you have any talk to your doctor or nurse. They can help you understand the side effects and cope with them. Common  tiredness (fatigue)   swelling of hands, feet, or ankles  Less common  hot flashes*   pain at tumor site   nausea   depression*   bone or joint pain   thinning hair   trouble sleeping   diarrhea  Rare  increased appetite   weight gain   pain   high blood pressure   increased sweating   increased osteoporosis (bone thinning)  *See \"Precautions\" section for more detailed information.   There are some other side effects not listed above that can also occur in some patients. Tell your doctor or nurse if you develop these or any other problems. FDA approval  Yes - first approved in 1999. Disclaimer: This information does not cover all possible uses, actions, precautions, side effects, or interactions. It is not intended as medical advice, and should not be relied upon as a substitute for talking with your doctor, who is familiar with your medical needs.    Last Medical Review: 12/02/2009  Last Revised: 12/02/2009

## 2017-07-18 NOTE — PROGRESS NOTES
Sarita Patrick is a 51 y.o. female here today for follow up of breast cancer. States hot flashes have been bad. Would like to discuss today.

## 2017-12-05 ENCOUNTER — HOSPITAL ENCOUNTER (OUTPATIENT)
Dept: MRI IMAGING | Age: 47
Discharge: HOME | End: 2017-12-05
Attending: FAMILY MEDICINE

## 2017-12-05 DIAGNOSIS — R41.3 MEMORY LOSS: ICD-10-CM

## 2017-12-26 ENCOUNTER — TELEPHONE (OUTPATIENT)
Dept: ONCOLOGY | Age: 47
End: 2017-12-26

## 2017-12-26 NOTE — TELEPHONE ENCOUNTER
Patient states that she is experiencing some breast pain(tenderness and soreness) in both breast as of today while doing a self exam. Patient would like a call back

## 2017-12-26 NOTE — TELEPHONE ENCOUNTER
HIPAA verified. Stated both breasts are sore with palpation. Stated breasts feel lumpy if squeezed. Has appt with Dr. Maldonado Frisk Friday. Advised would forward to provider for recommendation also. Verbalized understanding and thanked for call.

## 2017-12-26 NOTE — TELEPHONE ENCOUNTER
HIPAA verified. Advised that Dr. Julienne Schultz in agreement with seeing breast surgeon. Patient thanked for call.

## 2018-01-15 ENCOUNTER — DOCUMENTATION ONLY (OUTPATIENT)
Dept: SURGERY | Age: 48
End: 2018-01-15

## 2018-01-16 ENCOUNTER — OFFICE VISIT (OUTPATIENT)
Dept: SURGERY | Age: 48
End: 2018-01-16

## 2018-01-16 VITALS
SYSTOLIC BLOOD PRESSURE: 138 MMHG | DIASTOLIC BLOOD PRESSURE: 98 MMHG | HEIGHT: 63 IN | WEIGHT: 240 LBS | HEART RATE: 110 BPM | BODY MASS INDEX: 42.52 KG/M2

## 2018-01-16 DIAGNOSIS — C50.911 MALIGNANT NEOPLASM OF RIGHT BREAST, STAGE 2 (HCC): Primary | ICD-10-CM

## 2018-01-16 DIAGNOSIS — Z98.890 S/P LUMPECTOMY, RIGHT BREAST: ICD-10-CM

## 2018-01-16 RX ORDER — MIRTAZAPINE 15 MG/1
TABLET, FILM COATED ORAL
COMMUNITY
Start: 2018-01-02 | End: 2019-07-31 | Stop reason: SINTOL

## 2018-01-16 RX ORDER — VENLAFAXINE HYDROCHLORIDE 150 MG/1
CAPSULE, EXTENDED RELEASE ORAL
COMMUNITY
Start: 2018-01-02 | End: 2021-10-21

## 2018-01-16 NOTE — MR AVS SNAPSHOT
[address] 
[phone] Patient: Bekah Lemus MRN: [id number] :1970 Visit Information Date & Time Provider Department Dept. Phone Encounter #  
 2018  9:30 AM Kiley Celaya NP [address] at [facility]  Upcoming Health Maintenance Date Due Pneumococcal 19-64 Highest Risk (1 of 3 - PCV13) 10/21/1988 DTaP/Tdap/Td series (1 - Tdap) 10/21/1990 PAP AKA CERVICAL CYTOLOGY 10/21/1990 Influenza Age 5 to Adult 2017 Allergies as of 2018  Review Complete On: 2018 By: Kiley Celaya NP Severity Noted Reaction Type Reactions Adhesive Tape-silicones      Rash, Itching Darvocet A500 [Propoxyphene N-acetaminophen]  2016    Nausea Only, Other (comments)  
 headache Diazepam  2016    Other (comments) Not an allergy, just does not work for her at all. Hydrocodone  2016    Nausea and Vomiting, Vertigo Percocet [Oxycodone-acetaminophen]  2016    Nausea Only, Other (comments)  
 headache Tylenol-codeine #2  2016    Itching, Nausea Only, Other (comments) HEADACHES Current Immunizations  Reviewed on 2017 No immunizations on file. Not reviewed this visit You Were Diagnosed With   
  
 Codes Comments Malignant neoplasm of right breast, stage 2 ([facility] [address]    -  Primary ICD-10-CM: C50.911 ICD-9-CM: 174.9 S/P lumpectomy, right breast     ICD-10-CM: W77.562 ICD-9-CM: V45.89 Vitals BP Pulse Height(growth percentile) Weight(growth percentile) LMP BMI  
 (!) 138/98 (!) 110 5' 3\" (1.6 m) 240 lb (108.9 kg) 2014 (Approximate) 42.51 kg/m2 OB Status Smoking Status Hysterectomy Never Smoker BMI and BSA Data Body Mass Index Body Surface Area 42.51 kg/m 2 2.2 m 2 Preferred Pharmacy Pharmacy Name Phone [address], [address] [place] [address]  [phone] Your Updated Medication List  
  
   
This list is accurate as of: 1/16/18  4:08 PM.  Always use your most recent med list.  
  
  
  
  
 busPIRone 5 mg tablet Commonly known as:  BUSPAR  
  
 cyanocobalamin 1,000 mcg tablet Take 1,000 mcg by mouth daily. gabapentin 300 mg capsule Commonly known as:  NEURONTIN  
daily. losartan 25 mg tablet Commonly known as:  COZAAR Take 1 Tab by mouth daily. mirtazapine 15 mg tablet Commonly known as:  REMERON  
  
 tamoxifen 20 mg tablet Commonly known as:  NOLVADEX Take 1 Tab by mouth daily. Indications: HORMONE RECEPTOR POSITIVE BREAST CANCER  
  
 venlafaxine- mg capsule Commonly known as:  EFFEXOR-XR VENTOLIN HFA 90 mcg/actuation inhaler Generic drug:  albuterol  
  
 zolpidem 10 mg tablet Commonly known as:  AMBIEN To-Do List   
 07/01/2018 Imaging:  [facility] MAMMO BI DX INCL CAD Patient Instructions Breast Self-Exam: Care Instructions Your Care Instructions A breast self-exam is when you check your breasts for lumps or changes. This regular exam helps you learn how your breasts normally look and feel. Most breast problems or changes are not because of cancer. Breast self-exam is not a substitute for a mammogram. Having regular breast exams by your doctor and regular mammograms improve your chances of finding any problems with your breasts. Some women set a time each month to do a step-by-step breast self-exam. Other women like a less formal system. They might look at their breasts as they brush their teeth, or feel their breasts once in a while in the shower. If you notice a change in your breast, tell your doctor. Follow-up care is a key part of your treatment and safety.  Be sure to make and go to all appointments, and call your doctor if you are having problems. It's also a good idea to know your test results and keep a list of the medicines you take. How do you do a breast self-exam? 
· The best time to examine your breasts is usually one week after your menstrual period begins. Your breasts should not be tender then. If you do not have periods, you might do your exam on a day of the month that is easy to remember. · To examine your breasts: ¨ Remove all your clothes above the waist and lie down. When you are lying down, your breast tissue spreads evenly over your chest wall, which makes it easier to feel all your breast tissue. ¨ Use the pads-not the fingertips-of the 3 middle fingers of your left hand to check your right breast. Move your fingers slowly in small coin-sized circles that overlap. ¨ Use three levels of pressure to feel of all your breast tissue. Use light pressure to feel the tissue close to the skin surface. Use medium pressure to feel a little deeper. Use firm pressure to feel your tissue close to your breastbone and ribs. Use each pressure level to feel your breast tissue before moving on to the next spot. ¨ Check your entire breast, moving up and down as if following a strip from the collarbone to the bra line, and from the armpit to the ribs. Repeat until you have covered the entire breast. 
¨ Repeat this procedure for your left breast, using the pads of the 3 middle fingers of your right hand. · To examine your breasts while in the shower: 
¨ Place one arm over your head and lightly soap your breast on that side. ¨ Using the pads of your fingers, gently move your hand over your breast (in the strip pattern described above), feeling carefully for any lumps or changes. ¨ Repeat for the other breast. 
· Have your doctor inspect anything you notice to see if you need further testing. Where can you learn more? Go to http://pascual-senthil.info/.  
Enter P148 in the search box to learn more about \"Breast Self-Exam: Care Instructions. \" Current as of: May 12, 2017 Content Version: 11.4 © 2028-5749 Healthwise, [employer]. Care instructions adapted under license by [employer] (which disclaims liability or warranty for this information). If you have questions about a medical condition or this instruction, always ask your healthcare professional. [address] any warranty or liability for your use of this information. Introducing [facility]! Dear Sandra Ferrell: Thank you for requesting a Hub[employer] account. Our records indicate that you already have an active Hubspan account. You can access your account anytime at [email or web address] Did you know that you can access your hospital and ER discharge instructions at any time in Hubspan? You can also review all of your test results from your hospital stay or ER visit. Additional Information If you have questions, please visit the Frequently Asked Questions section of the Hub[employer] website at [email or web address] Remember, Hubspan is NOT to be used for urgent needs. For medical emergencies, dial 911. Now available from your iPhone and Android! Please provide this summary of care documentation to your next provider. Your primary care clinician is listed as Sherice Alegre. If you have any questions after today's visit, please call [phone].

## 2018-01-16 NOTE — PROGRESS NOTES
HISTORY OF PRESENT ILLNESS  Derek Baxter is a 49 y.o. female. Breast Cancer     ESTABLISHED patient here today for follow up RIGHT breast cancer. Has BILATERAL breast tenderness. Denies any other breast problems at this time. She is no longer taking Tamoxifen.      History of breast cancer-    1/29/2016: RIGHT core bx of IMC with [facility]. ER+100%/SC+90% Her2- ([facility]: ER+80%/SC+20%)  3/31/2016: RIGHT lumpectomy with SNBx of 1.9 cm, gr2 ILC with DCIS and LCIS. 2/2 nodes involved. Clear margins. pT1c pN1a Mx.  5/17/2016: RIGHT ALND. 0/10 nodes involved. 8/18/2016: completed adjuvant chemo (TC x4).  9/23/2016-01/10/2017: completed all but 8 treatments of XRT (stopped midway and started back again). Followed by Dr. Pam Malhotra. Tried Arimidex, but stopped. Started on Tamoxifen, but has stopped. Followed by Dr. Garett Gupta. OB History     Obstetric Comments    Menarche:  8. LMP: 38.  # of Children:  3. Age at Delivery of First Child:  20.   Hysterectomy/oophorectomy:  YES/NO. Breast Bx:  Yes,. Hx of Breast Feeding:  Yes. BCP:  Yes, in the past. Hormone therapy:  Yes. Past Surgical History:   Procedure Laterality Date    BREAST SURGERY PROCEDURE UNLISTED Right 5/17/16    RIGHT axillary lymph node dissection and PAC insertion    HX BREAST BIOPSY Right     SEVERAL TIMES    HX BREAST LUMPECTOMY Right 3/31/2016    RIGHT BREAST LUMPECTOMY, RIGHT SENTINEL NODE BIOPSY WITH ULTRASOUND performed by Gisela Ovalles MD at [address] HX CHOLECYSTECTOMY      HX GI      COLONOSCOPY    HX GYN      cesarian x 3    HX HYSTERECTOMY      Still has ovaries    HX ORTHOPAEDIC Left     ARM FX WITH PINS    HX TONSILLECTOMY      HX UROLOGICAL      CYSTOSCOPY    HX UROLOGICAL      BLADDER SLING    KAREN STEREO  BX BREAST RT 1ST LESION W/CLIP AND SPECIMEN Right 2013    benign    KAREN US BX BREAST RT 1ST LESION W/CLIP AND SPECIMEN Right 01/29/2016    Ca in situ     No FH of breast or ovarian cancer.    Recent imaging-  Bilateral diagnostic mammogram on 7/11/17- BIRADS 2    ROS    Physical Exam   Constitutional: She appears well-developed and well-nourished. Pulmonary/Chest: Right breast exhibits tenderness. Right breast exhibits no inverted nipple, no mass, no nipple discharge and no skin change (well healed surgical scar). Left breast exhibits tenderness. Left breast exhibits no inverted nipple, no mass, no nipple discharge and no skin change. Breasts are symmetrical.   Bilateral tenderness throughout breasts   Musculoskeletal: Normal range of motion. UE x 2   Lymphadenopathy:     She has no cervical adenopathy. She has no axillary adenopathy. Right: No supraclavicular adenopathy present. Left: No supraclavicular adenopathy present. Skin: Skin is warm, dry and intact. Chest and breasts examined   Psychiatric: She has a normal mood and affect. Her speech is normal and behavior is normal.     Visit Vitals    BP (!) 138/98    Pulse (!) 110    Ht 5' 3\" (1.6 m)    Wt 240 lb (108.9 kg)    LMP 07/04/2014 (Approximate)    BMI 42.51 kg/m2     ASSESSMENT and PLAN  Encounter Diagnoses   Name Primary?  Malignant neoplasm of right breast, stage 2 ([address] Yes    S/P lumpectomy, right breast      Normal exam and imaging in this patient with a history of right breast cancer. She is not currently taking any endocrine therapy as she tried Arimidex and tamoxifen and could not tolerate the side effects (mainly mood swings) of either. She has had a hysterectomy, but feels she is a in perimenopause due to hot flashes and night sweats. Discussed that her bilateral breast tenderness is likely due to hormonal fluctuations leading to fibrocystic pain. She will plan to have a BDmammogram and RTC here in 6 months. She is comfortable with this plan. All questions answered and she stated understanding.

## 2018-01-16 NOTE — PATIENT INSTRUCTIONS
Breast Self-Exam: Care Instructions  Your Care Instructions    A breast self-exam is when you check your breasts for lumps or changes. This regular exam helps you learn how your breasts normally look and feel. Most breast problems or changes are not because of cancer. Breast self-exam is not a substitute for a mammogram. Having regular breast exams by your doctor and regular mammograms improve your chances of finding any problems with your breasts. Some women set a time each month to do a step-by-step breast self-exam. Other women like a less formal system. They might look at their breasts as they brush their teeth, or feel their breasts once in a while in the shower. If you notice a change in your breast, tell your doctor. Follow-up care is a key part of your treatment and safety. Be sure to make and go to all appointments, and call your doctor if you are having problems. It's also a good idea to know your test results and keep a list of the medicines you take. How do you do a breast self-exam?  · The best time to examine your breasts is usually one week after your menstrual period begins. Your breasts should not be tender then. If you do not have periods, you might do your exam on a day of the month that is easy to remember. · To examine your breasts:  ¨ Remove all your clothes above the waist and lie down. When you are lying down, your breast tissue spreads evenly over your chest wall, which makes it easier to feel all your breast tissue. ¨ Use the pads-not the fingertips-of the 3 middle fingers of your left hand to check your right breast. Move your fingers slowly in small coin-sized circles that overlap. ¨ Use three levels of pressure to feel of all your breast tissue. Use light pressure to feel the tissue close to the skin surface. Use medium pressure to feel a little deeper. Use firm pressure to feel your tissue close to your breastbone and ribs.  Use each pressure level to feel your breast tissue before moving on to the next spot. ¨ Check your entire breast, moving up and down as if following a strip from the collarbone to the bra line, and from the armpit to the ribs. Repeat until you have covered the entire breast.  ¨ Repeat this procedure for your left breast, using the pads of the 3 middle fingers of your right hand. · To examine your breasts while in the shower:  ¨ Place one arm over your head and lightly soap your breast on that side. ¨ Using the pads of your fingers, gently move your hand over your breast (in the strip pattern described above), feeling carefully for any lumps or changes. ¨ Repeat for the other breast.  · Have your doctor inspect anything you notice to see if you need further testing. Where can you learn more? Go to http://pascual-senthil.info/. Enter P148 in the search box to learn more about \"Breast Self-Exam: Care Instructions. \"  Current as of: May 12, 2017  Content Version: 11.4  © 7629-3793 Healthwise, [employer]. Care instructions adapted under license by [employer] (which disclaims liability or warranty for this information). If you have questions about a medical condition or this instruction, always ask your healthcare professional. [place] [address] any warranty or liability for your use of this information.

## 2018-01-16 NOTE — PROGRESS NOTES
HISTORY OF PRESENT ILLNESS  Gladis Noyola is a 49 y.o. female. HPI   ESTABLISHED patient here today for follow up RIGHT breast cancer. Has BILATERAL breast tenderness. Denies any other breast problems at this time. She is no longer taking Tamoxifen.      History of breast cancer-    1/29/2016: RIGHT core bx of IMC with [facility]. ER+100%/OR+90% Her2- ([facility]: ER+80%/OR+20%)  3/31/2016: RIGHT lumpectomy with SNBx of 1.9 cm, gr2 ILC with DCIS and LCIS. 2/2 nodes involved. Clear margins. pT1c pN1a Mx.  5/17/2016: RIGHT ALND. 0/10 nodes involved. 8/18/2016: completed adjuvant chemo (TC x4).  9/23/2016-01/10/2017: completed all but 8 treatments of XRT (stopped midway and started back again). Followed by Dr. Isiah Guthrie. Tried Arimidex, but stopped. Started on Tamoxifen, but has stopped. Followed by Dr. Gina Soni.      No FH of breast or ovarian cancer.      Recent imaging-  Bilateral diagnostic mammogram on 7/11/17- BIRADS 2    ROS    Physical Exam    ASSESSMENT and PLAN  {ASSESSMENT/PLAN:[id number]}

## 2018-10-02 ENCOUNTER — DOCUMENTATION ONLY (OUTPATIENT)
Dept: ONCOLOGY | Age: 48
End: 2018-10-02

## 2018-10-02 ENCOUNTER — OFFICE VISIT (OUTPATIENT)
Dept: ONCOLOGY | Age: 48
End: 2018-10-02

## 2018-10-02 VITALS
TEMPERATURE: 97.7 F | DIASTOLIC BLOOD PRESSURE: 75 MMHG | OXYGEN SATURATION: 95 % | SYSTOLIC BLOOD PRESSURE: 108 MMHG | WEIGHT: 239 LBS | BODY MASS INDEX: 42.35 KG/M2 | RESPIRATION RATE: 16 BRPM | HEIGHT: 63 IN | HEART RATE: 87 BPM

## 2018-10-02 DIAGNOSIS — R41.3 MEMORY LOSS: ICD-10-CM

## 2018-10-02 DIAGNOSIS — Z98.890 HISTORY OF LUMPECTOMY OF RIGHT BREAST: ICD-10-CM

## 2018-10-02 DIAGNOSIS — M79.89 SWELLING OF ARM: ICD-10-CM

## 2018-10-02 DIAGNOSIS — I10 ESSENTIAL HYPERTENSION: ICD-10-CM

## 2018-10-02 DIAGNOSIS — E66.01 OBESITY, MORBID (HCC): ICD-10-CM

## 2018-10-02 DIAGNOSIS — C77.3 CARCINOMA OF RIGHT BREAST METASTATIC TO AXILLARY LYMPH NODE (HCC): Primary | ICD-10-CM

## 2018-10-02 DIAGNOSIS — C50.911 CARCINOMA OF RIGHT BREAST METASTATIC TO AXILLARY LYMPH NODE (HCC): Primary | ICD-10-CM

## 2018-10-02 DIAGNOSIS — R79.89 LOW VITAMIN D LEVEL: ICD-10-CM

## 2018-10-02 DIAGNOSIS — R53.83 FATIGUE, UNSPECIFIED TYPE: ICD-10-CM

## 2018-10-02 DIAGNOSIS — M19.90 ARTHRITIS: ICD-10-CM

## 2018-10-02 RX ORDER — SERTRALINE HYDROCHLORIDE 50 MG/1
TABLET, FILM COATED ORAL
Refills: 1 | COMMUNITY
Start: 2018-06-26 | End: 2019-07-31 | Stop reason: SINTOL

## 2018-10-02 RX ORDER — TRAZODONE HYDROCHLORIDE 50 MG/1
TABLET ORAL
Refills: 0 | COMMUNITY
Start: 2018-09-26 | End: 2019-07-31 | Stop reason: SINTOL

## 2018-10-02 RX ORDER — HYDROCHLOROTHIAZIDE 12.5 MG/1
CAPSULE ORAL
Refills: 1 | COMMUNITY
Start: 2018-09-11 | End: 2021-12-03 | Stop reason: ALTCHOICE

## 2018-10-02 RX ORDER — DULOXETIN HYDROCHLORIDE 30 MG/1
CAPSULE, DELAYED RELEASE ORAL
Refills: 1 | COMMUNITY
Start: 2018-09-13 | End: 2019-07-31 | Stop reason: ALTCHOICE

## 2018-10-02 RX ORDER — ESCITALOPRAM OXALATE 10 MG/1
TABLET ORAL
Refills: 0 | COMMUNITY
Start: 2018-09-11 | End: 2021-10-21

## 2018-10-02 RX ORDER — CETIRIZINE HCL 10 MG
TABLET ORAL
Refills: 5 | COMMUNITY
Start: 2018-09-11 | End: 2020-07-23 | Stop reason: SDUPTHER

## 2018-10-02 RX ORDER — LANOLIN ALCOHOL/MO/W.PET/CERES
CREAM (GRAM) TOPICAL
COMMUNITY

## 2018-10-02 RX ORDER — PYRIDOXINE HCL (VITAMIN B6) 25 MG
25 TABLET ORAL DAILY
COMMUNITY
End: 2019-07-31 | Stop reason: SDUPTHER

## 2018-10-02 NOTE — PROGRESS NOTES
HEME/ONC PROGRESS NOTE       Pb Evans is a 49 y.o. 1970 female and presents with Breast Cancer    CC  Breast cancer 1/16    HPI: Pt is here for f/u breast cancer not on any therapy from here. last seen here 7/17 per pt preference. Saw surgery 1/18 and not on any hormonal therapy. Pt thinks she has chemo brain now and seeing neurology Dr Patria Lacey for this. Pt states she has memory issues/ cannot remember where she is going at times. Seeing neuro psych. Pt reports \"severe memory loss\". Pt states she cannot work due to this. Works in social work. C/o neuropathy in feet. Pt is applying for disability via neurology. C/o right arm LE still. No CP/ SOB. Pt still needs to do mammo. Last 7/17. [place] Pt is asking for B12 rx and iron pills. States got this from PCP. Last visit 7/17:  off adjuvant tamoxifen. Could not tolerate AI. Having bad hot flashes and wants to discuss side effects. C/o moodiness/ sweats/ achiness from tamoxifen. Pt has been off tamoxifen for about 2 weeks. Pt is having trouble working due to side effects. C/o weight gain and not eating much. Willing to meet with nutrition. Wants to have possible surgery for weight loss. Seeing dr julian for this. Last visit:  Fu after stopping AI due to side effects after taking it for a few months. C/o tenderness to both breasts and saw breast surgery for this. C/o mood swings/ joint pains stiffness/ hot flashes bad. Been off AI for 2 weeks and side effects getting better. Sexual side effects better off AI also. Pt not sure if she wants to try another pill. DX   Encounter Diagnoses   Name Primary?  Carcinoma of right breast metastatic to axillary lymph node (HCC) Yes    History of lumpectomy of right breast     Memory loss     Swelling of arm         STAGE:  2 ER+ HEr2 neg  mammoprint low risk     TREATMENT COURSE: arimadex stopped due to side effects.   Tamoxifen stopped 7/17 due to side effects. right lumpectomy/  Axillary dissection. TC chemo completed 8/2016. radiation done 1/16. Past Medical History:   Diagnosis Date    Arthritis     HANDS AND KNEES    Asthma     Breast cancer ([facility] [address] 03/31/2017    Carcinoma in situ    Cancer ([facility] [address] 2016    RIGHT breast cancer    Depression     GERD (gastroesophageal reflux disease)     Heart murmur     Hypertension     Neuropathy     PUD (peptic ulcer disease)     S/P radiation therapy 2016    and chemo finished all 1/2017    Vertigo      Past Surgical History:   Procedure Laterality Date    BREAST SURGERY PROCEDURE UNLISTED Right 5/17/16    RIGHT axillary lymph node dissection and PAC insertion    HX BREAST BIOPSY Right     SEVERAL TIMES    HX BREAST LUMPECTOMY Right 3/31/2016    RIGHT BREAST LUMPECTOMY, RIGHT SENTINEL NODE BIOPSY WITH ULTRASOUND performed by Sophy Clayton MD at [address] HX CHOLECYSTECTOMY      HX GI      COLONOSCOPY    HX GYN      cesarian x 3    HX HYSTERECTOMY      Still has ovaries    HX ORTHOPAEDIC Left     ARM FX WITH PINS    HX TONSILLECTOMY      HX UROLOGICAL      CYSTOSCOPY    HX UROLOGICAL      BLADDER SLING    KAREN STEREO  BX BREAST RT 1ST LESION W/CLIP AND SPECIMEN Right 2013    benign    KAREN US BX BREAST RT 1ST LESION W/CLIP AND SPECIMEN Right 01/29/2016    Ca in situ     Social History     Social History    Marital status:      Spouse name: N/A    Number of children: N/A    Years of education: N/A     Social History Main Topics    Smoking status: Never Smoker    Smokeless tobacco: Never Used    Alcohol use 0.0 oz/week     0 Standard drinks or equivalent per week      Comment: occasionally a glass of wine    Drug use: No    Sexual activity: Yes     Partners: Male     Birth control/ protection: None     Other Topics Concern    None     Social History Narrative    Recently moved from [place], they have been here since April. .       Family History Problem Relation Age of Onset    Hypertension Mother     Kidney Disease Mother      ON DIALYSIS    Heart Disease Mother     No Known Problems Father     Asthma Son     Asthma Daughter     No Known Problems Son     Anesth Problems Neg Hx        Current Outpatient Prescriptions   Medication Sig Dispense Refill    DULoxetine (CYMBALTA) 30 mg capsule TK 1 C PO QD  1    escitalopram oxalate (LEXAPRO) 10 mg tablet TK 1 T PO QD  0    hydroCHLOROthiazide (MICROZIDE) 12.5 mg capsule TK 1 C PO QD IN THE MORNING  1    sertraline (ZOLOFT) 50 mg tablet TK 1 T PO QD  1    traZODone (DESYREL) 50 mg tablet   0    cetirizine (ZYRTEC) 10 mg tablet TK 1 T PO QD  5    pyridoxine, vitamin B6, (VITAMIN B-6) 25 mg tablet Take 25 mg by mouth daily.  ferrous sulfate (FEOSOL) 325 mg (65 mg iron) tablet Take  by mouth Daily (before breakfast). Takes 3 tabs daily      busPIRone (BUSPAR) 5 mg tablet       gabapentin (NEURONTIN) 300 mg capsule daily.  zolpidem (AMBIEN) 10 mg tablet       cyanocobalamin 1,000 mcg tablet Take 1,000 mcg by mouth daily.  VENTOLIN HFA 90 mcg/actuation inhaler       losartan (COZAAR) 25 mg tablet Take 1 Tab by mouth daily. 90 Tab 1    mirtazapine (REMERON) 15 mg tablet       venlafaxine-SR (EFFEXOR-XR) 150 mg capsule       tamoxifen (NOLVADEX) 20 mg tablet Take 1 Tab by mouth daily. Indications: HORMONE RECEPTOR POSITIVE BREAST CANCER 30 Tab 3       Allergies   Allergen Reactions    Adhesive Tape-Silicones Rash and Itching    Darvocet A500 [Propoxyphene N-Acetaminophen] Nausea Only and Other (comments)     headache    Diazepam Other (comments)     Not an allergy, just does not work for her at all.     Hydrocodone Nausea and Vomiting and Vertigo    Percocet [Oxycodone-Acetaminophen] Nausea Only and Other (comments)     headache    Tylenol-Codeine #2 Itching, Nausea Only and Other (comments)     HEADACHES         Review of Systems    A comprehensive review of systems was negative except for: per HPI     Objective:  Visit Vitals    /75    Pulse 87    Temp 97.7 °F (36.5 °C) (Oral)    Resp 16    Ht 5' 3\" (1.6 m)    Wt 239 lb (108.4 kg)    LMP 07/04/2014 (Approximate)    SpO2 95%    BMI 42.34 kg/m2         Physical Exam:   General appearance - alert, well appearing, and in no distress and oriented to person, place, and time  Mental status - alert, oriented to person, place, and time, normal mood, behavior, speech, dress, motor activity, and thought processes  EYE-conj clear  Neck supple  Lungs - Clear to auscultation bilaterally  CV - Regular rate and rhythm  Abdomen - Round, soft, non-tender. Ext-no edema  Skin-Warm and dry. Neuro -alert, oriented, normal speech, no focal findings   Breast - no masses palpable b/l    Diagnostic Imaging   reviewed  Results for orders placed during the hospital encounter of 08/24/16   XR CHEST PA LAT    Narrative Indication:  fever     Exam: PA and lateral views of the chest.    Direct comparison is made to prior CXR dated June 7, 2016. Findings: Cardiomediastinal silhouette is within normal limits. Central venous  port catheter is unchanged in position. Lungs are clear bilaterally. Pleural  spaces are normal. Osseous structures are intact. Impression IMPRESSION: No acute cardiopulmonary disease. Results for orders placed during the hospital encounter of 05/06/16   CT ABDOMEN W WO CONT AND PELVIS W CONT    Narrative **Final Report**       ICD Codes / Adm. Diagnosis: 174.9  174.9 / Malignant neoplasm of breast (    Malignant neoplasm of breast  Examination:  CT ABD W WO CON PELVIS W CON  - [id number] - May  6 2016  2:44PM  Accession No:  [id number]  Reason:        REPORT:  EXAM:  CT THORAX W CON, CT ABD W WO CON PELVIS W CON    INDICATION:   breast cancer staging    COMPARISON: None.     CONTRAST:  90 mL of Isovue-370     TECHNIQUE: Helical CT was performed of the abdomen without IV contrast and   with IV contrast during arterial administration. Multislice helical CT was performed from the thoracic inlet to the symphysis   pubis during uneventful rapid bolus intravenous contrast administration. Contiguous 5 mm axial images were reconstructed and lung and soft tissue   windows were generated. Coronal and sagittal reformations were generated. FINDINGS:  CT abdomen without demonstrates no abnormal calcifications. CT of the abdomen during arterial phase imaging demonstrates vague area of   enhancement in segment 4A most likely hepatic perfusion anomaly. No focal   lesions are identified in the spleen, pancreas, adrenal glands or kidneys. CT CHEST: Postsurgical changes are noted in the right breast. No mediastinal   or hilar adenopathy is identified. There is a tiny pericardial effusion. There is a tiny right pleural effusion    No pulmonary masses are identified. There is a 2 mm subpleural nodule right   middle lobe. CT abdomen and pelvis: No focal lesions are identified in the liver, spleen,   pancreas, kidneys or adrenal glands. No retroperitoneal adenopathy is   identified. Bowel is not obstructed. No pelvic adenopathy is identified. Bony structures are unremarkable. IMPRESSION:  1. CT chest demonstrates no definite evidence of metastatic disease. Post   surgical changes noted right breast. There is a 2 mm subpleural nodule right   middle lobe. 2. CT abdomen and pelvis demonstrates no evidence of metastatic disease.               Signing/Reading Doctor: [facility] ([id number])    [facility][id number][facility]  May  6 2016  3:34PM                                    Lab Results  Per PCP/ no records here  Lab Results   Component Value Date/Time    WBC 5.1 10/27/2016 12:28 PM    HGB 11.3 (L) 10/27/2016 12:28 PM    HCT 36.1 10/27/2016 12:28 PM    PLATELET 433 (H) 74/77/0890 12:28 PM    MCV 81.7 10/27/2016 12:28 PM       Lab Results   Component Value Date/Time    Sodium 142 10/27/2016 12:28 PM Potassium 3.8 10/27/2016 12:28 PM    Chloride 105 10/27/2016 12:28 PM    CO2 28 10/27/2016 12:28 PM    Anion gap 9 10/27/2016 12:28 PM    Glucose 93 10/27/2016 12:28 PM    BUN 10 10/27/2016 12:28 PM    Creatinine 0.71 10/27/2016 12:28 PM    BUN/Creatinine ratio 14 10/27/2016 12:28 PM    GFR est AA >60 10/27/2016 12:28 PM    GFR est non-AA >60 10/27/2016 12:28 PM    Calcium 9.6 10/27/2016 12:28 PM    AST (SGOT) 30 10/27/2016 12:28 PM    Alk. phosphatase 99 10/27/2016 12:28 PM    Protein, total 7.1 10/28/2016 02:48 PM    Albumin 3.5 10/27/2016 12:28 PM    Globulin 4.1 (H) 10/27/2016 12:28 PM    A-G Ratio 0.9 (L) 10/27/2016 12:28 PM    ALT (SGPT) 46 10/27/2016 12:28 PM     Assessment/Plan:     Pb Evans is a 49 y.o. female and presents with Breast Cancer    CC  Breast cancer 1/16    DX   Encounter Diagnoses   Name Primary?  Malignant neoplasm of right breast, stage 2 ([address] Yes    Lobular breast cancer, right ([facility] [address]         STAGE:  2 ER+ HEr2 neg  mammoprint low risk     TREATMENT COURSE: right lumpectomy/ axillary LN dissection. TC chemo then radiation  Tried arimadex and stopped 5/17 due to side effects. 1.  Stage 2 breast cancer ER+ HER2 neg mammoprint low risk luminal A s/p lumpectomy and axillary LN dissection which showed 0/10 LN +. Pt has not been here since 7/17 per her preference. Hx of adjuvant chemotherapy with TC x 4 on 8/18   stopped radiation early 1/17. Pt had side effects to arimadex and stopped it. Did not want to restart this due to joint symptoms. Pt was on tamoxifen and stopped this also due to side effects 7/17. Pt could not tolerate adjuvant hormonal therapy. Pt has multiple issues today including memory loss/ fatigue/ not able to work. Pt wants to get disability. And is seeing neuro. Wants labs with vitamin levels today. Ordered. last mammo 7/17 and has not done this years yet. Encouraged pt to get this done today.    Will order brain MRI for eval as pt states she has not had this done. 2.  Memory loss/ seeing neuro. Check brain MRI. 3.  RUE LE. To LE clinic. 4.  Works as a . Pt cannot work now due to memory issues/ neuropathy/ RUE lymphedema. Trying for disability. We will help if we can. Navigator support as needed. Call if questions. Follow-up in 6 months. ICD-10-CM ICD-9-CM    1. Carcinoma of right breast metastatic to axillary lymph node (HCC) C50.911 174.9 REFERRAL TO LYMPHEDEMA CLINIC    C77.3 196.3 MRI BRAIN W WO CONT   2. History of lumpectomy of right breast Z98.890 V45.89 REFERRAL TO LYMPHEDEMA CLINIC      MRI BRAIN W WO CONT   3. Memory loss R41.3 780.93 MRI BRAIN W WO CONT   4. Swelling of arm M79.89 729.81 MRI BRAIN W WO CONT       Current Outpatient Prescriptions   Medication Sig    DULoxetine (CYMBALTA) 30 mg capsule TK 1 C PO QD    escitalopram oxalate (LEXAPRO) 10 mg tablet TK 1 T PO QD    hydroCHLOROthiazide (MICROZIDE) 12.5 mg capsule TK 1 C PO QD IN THE MORNING    sertraline (ZOLOFT) 50 mg tablet TK 1 T PO QD    traZODone (DESYREL) 50 mg tablet     cetirizine (ZYRTEC) 10 mg tablet TK 1 T PO QD    pyridoxine, vitamin B6, (VITAMIN B-6) 25 mg tablet Take 25 mg by mouth daily.  ferrous sulfate (FEOSOL) 325 mg (65 mg iron) tablet Take  by mouth Daily (before breakfast). Takes 3 tabs daily    busPIRone (BUSPAR) 5 mg tablet     gabapentin (NEURONTIN) 300 mg capsule daily.  zolpidem (AMBIEN) 10 mg tablet     cyanocobalamin 1,000 mcg tablet Take 1,000 mcg by mouth daily.  VENTOLIN HFA 90 mcg/actuation inhaler     losartan (COZAAR) 25 mg tablet Take 1 Tab by mouth daily.  mirtazapine (REMERON) 15 mg tablet     venlafaxine-SR (EFFEXOR-XR) 150 mg capsule     tamoxifen (NOLVADEX) 20 mg tablet Take 1 Tab by mouth daily. Indications: HORMONE RECEPTOR POSITIVE BREAST CANCER     No current facility-administered medications for this visit.         continue present plan, call if any problems  There are no Patient Instructions on file for this visit. Follow-up Disposition:  Return in about 6 months (around 4/2/2019).     Brent Wolfe, DO

## 2018-10-02 NOTE — PROGRESS NOTES
Request for recent office notes be sent to this office faxed complete to office of Dr. Cam Glaser(neuro).

## 2018-10-02 NOTE — PROGRESS NOTES
Brooklyn Bates is a 49 y.o. female here today for follow up of breast cancer. States lymphedema to bilat. Lower legs is worse. States neuropathy to right hand.

## 2018-10-02 NOTE — MR AVS SNAPSHOT
[address] [facility] [address] 
[phone] Patient: Addy Babin MRN: [id number] :1970 Visit Information Date & Time Provider Department Dept. Phone Encounter #  
 10/2/2018  1:30 PM [address]  Oncology at [facility] [phone] Your Appointments 10/2/2018  1:30 PM  
[facility] Appt Note: fup (breast ca) [address] [facility] [address] [id number]  
[phone]  
  
   
 [address] [facility] [address] Upcoming Health Maintenance Date Due Pneumococcal 19-64 Highest Risk (1 of 3 - PCV13) 10/21/1988 DTaP/Tdap/Td series (1 - Tdap) 10/21/1990 PAP AKA CERVICAL CYTOLOGY 10/21/1990 Influenza Age 5 to Adult 2018 Allergies as of 10/2/2018  Review Complete On: 2018 By: Cordelia Yoo NP Severity Noted Reaction Type Reactions Adhesive Tape-silicones      Rash, Itching Darvocet A500 [Propoxyphene N-acetaminophen]  2016    Nausea Only, Other (comments)  
 headache Diazepam  2016    Other (comments) Not an allergy, just does not work for her at all. Hydrocodone  2016    Nausea and Vomiting, Vertigo Percocet [Oxycodone-acetaminophen]  2016    Nausea Only, Other (comments)  
 headache Tylenol-codeine #2  2016    Itching, Nausea Only, Other (comments) HEADACHES Current Immunizations  Reviewed on 2017 No immunizations on file. Not reviewed this visit Vitals LMP OB Status Smoking Status 2014 (Approximate) Hysterectomy Never Smoker Your Updated Medication List  
  
   
This list is accurate as of 10/2/18 12:41 PM.  Always use your most recent med list.  
  
  
  
  
 busPIRone 5 mg tablet Commonly known as:  BUSPAR  
 cyanocobalamin 1,000 mcg tablet Take 1,000 mcg by mouth daily. gabapentin 300 mg capsule Commonly known as:  NEURONTIN  
daily. losartan 25 mg tablet Commonly known as:  COZAAR Take 1 Tab by mouth daily. mirtazapine 15 mg tablet Commonly known as:  REMERON  
  
 tamoxifen 20 mg tablet Commonly known as:  NOLVADEX Take 1 Tab by mouth daily. Indications: HORMONE RECEPTOR POSITIVE BREAST CANCER  
  
 venlafaxine- mg capsule Commonly known as:  EFFEXOR-XR VENTOLIN HFA 90 mcg/actuation inhaler Generic drug:  albuterol  
  
 zolpidem 10 mg tablet Commonly known as:  AMBIEN [facility]! Dear Watson Apley: Thank you for requesting a [employer] account. Our records indicate that you already have an active [employer] account. You can access your account anytime at [email or web address] Did you know that you can access your hospital and ER discharge instructions at any time in [employer]? You can also review all of your test results from your hospital stay or ER visit. Additional Information If you have questions, please visit the Frequently Asked Questions section of the [employer] website at [email or web address]/. Remember, [employer] is NOT to be used for urgent needs. For medical emergencies, dial 911. Now available from your iPhone and Android! Please provide this summary of care documentation to your next provider. Your primary care clinician is listed as Phys Other. If you have any questions after today's visit, please call [phone].

## 2018-10-03 ENCOUNTER — HOSPITAL ENCOUNTER (OUTPATIENT)
Dept: MAMMOGRAPHY | Age: 48
Discharge: HOME | End: 2018-10-03
Attending: NURSE PRACTITIONER
Payer: SELF-PAY

## 2018-10-03 DIAGNOSIS — C50.911 MALIGNANT NEOPLASM OF RIGHT BREAST, STAGE 2 (HCC): ICD-10-CM

## 2018-10-03 DIAGNOSIS — Z98.890 S/P LUMPECTOMY, RIGHT BREAST: ICD-10-CM

## 2018-10-03 LAB
25(OH)D3+25(OH)D2 SERPL-MCNC: 31.9 NG/ML (ref 30–100)
ALBUMIN SERPL-MCNC: 4.4 G/DL (ref 3.5–5.5)
ALBUMIN/GLOB SERPL: 1.3 {RATIO} (ref 1.2–2.2)
ALP SERPL-CCNC: 114 IU/L (ref 39–117)
ALT SERPL-CCNC: 38 IU/L (ref 0–32)
AST SERPL-CCNC: 32 IU/L (ref 0–40)
BASOPHILS # BLD AUTO: 0 X10E3/UL (ref 0–0.2)
BASOPHILS NFR BLD AUTO: 0 %
BILIRUB SERPL-MCNC: 0.2 MG/DL (ref 0–1.2)
BUN SERPL-MCNC: 9 MG/DL (ref 6–24)
BUN/CREAT SERPL: 11 (ref 9–23)
CALCIUM SERPL-MCNC: 10.2 MG/DL (ref 8.7–10.2)
CHLORIDE SERPL-SCNC: 99 MMOL/L (ref 96–106)
CO2 SERPL-SCNC: 23 MMOL/L (ref 20–29)
CREAT SERPL-MCNC: 0.83 MG/DL (ref 0.57–1)
EOSINOPHIL # BLD AUTO: 0.2 X10E3/UL (ref 0–0.4)
EOSINOPHIL NFR BLD AUTO: 4 %
ERYTHROCYTE [DISTWIDTH] IN BLOOD BY AUTOMATED COUNT: 15.3 % (ref 12.3–15.4)
FERRITIN SERPL-MCNC: 190 NG/ML (ref 15–150)
FOLATE SERPL-MCNC: >20 NG/ML
GLOBULIN SER CALC-MCNC: 3.4 G/DL (ref 1.5–4.5)
GLUCOSE SERPL-MCNC: 108 MG/DL (ref 65–99)
HCT VFR BLD AUTO: 37.5 % (ref 34–46.6)
HGB BLD-MCNC: 12.1 G/DL (ref 11.1–15.9)
IMM GRANULOCYTES # BLD: 0 X10E3/UL (ref 0–0.1)
IMM GRANULOCYTES NFR BLD: 0 %
IRON SATN MFR SERPL: 16 % (ref 15–55)
IRON SERPL-MCNC: 56 UG/DL (ref 27–159)
LYMPHOCYTES # BLD AUTO: 2 X10E3/UL (ref 0.7–3.1)
LYMPHOCYTES NFR BLD AUTO: 34 %
MCH RBC QN AUTO: 27.2 PG (ref 26.6–33)
MCHC RBC AUTO-ENTMCNC: 32.3 G/DL (ref 31.5–35.7)
MCV RBC AUTO: 84 FL (ref 79–97)
MONOCYTES # BLD AUTO: 0.3 X10E3/UL (ref 0.1–0.9)
MONOCYTES NFR BLD AUTO: 4 %
NEUTROPHILS # BLD AUTO: 3.5 X10E3/UL (ref 1.4–7)
NEUTROPHILS NFR BLD AUTO: 58 %
PLATELET # BLD AUTO: 369 X10E3/UL (ref 150–379)
POTASSIUM SERPL-SCNC: 4 MMOL/L (ref 3.5–5.2)
PROT SERPL-MCNC: 7.8 G/DL (ref 6–8.5)
RBC # BLD AUTO: 4.45 X10E6/UL (ref 3.77–5.28)
SODIUM SERPL-SCNC: 142 MMOL/L (ref 134–144)
TIBC SERPL-MCNC: 357 UG/DL (ref 250–450)
TSH SERPL DL<=0.005 MIU/L-ACNC: 2.23 UIU/ML (ref 0.45–4.5)
UIBC SERPL-MCNC: 301 UG/DL (ref 131–425)
VIT B12 SERPL-MCNC: 1253 PG/ML (ref 232–1245)
WBC # BLD AUTO: 6.1 X10E3/UL (ref 3.4–10.8)

## 2018-10-03 PROCEDURE — 77066 DX MAMMO INCL CAD BI: CPT

## 2018-10-05 ENCOUNTER — HOSPITAL ENCOUNTER (OUTPATIENT)
Dept: PHYSICAL THERAPY | Age: 48
Discharge: HOME | End: 2018-10-05
Payer: SELF-PAY

## 2018-10-05 VITALS — BODY MASS INDEX: 42.59 KG/M2 | WEIGHT: 240.4 LBS | HEIGHT: 63 IN

## 2018-10-05 PROCEDURE — 97110 THERAPEUTIC EXERCISES: CPT

## 2018-10-05 PROCEDURE — 97140 MANUAL THERAPY 1/> REGIONS: CPT

## 2018-10-05 PROCEDURE — 97161 PT EVAL LOW COMPLEX 20 MIN: CPT

## 2018-10-05 NOTE — PROGRESS NOTES
[facility]  Physical Therapy Lymphedema Clinic  [address], [address]  [facility], [address]    INITIAL EVALUATION    NAME: Marychuy Rubin AGE: 49 y.o. 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 She reports that it is difficult for her to make early morning appointments. 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 Patient states that she continues to have R UE/chest and B LE swelling which tends to fluctuate but appears to be worsening. She reports a recent onset of bilateral thigh and abdominal swelling which makes getting dressed and walking more difficult. She reports that she is unable to work at this time due o issues with swelling and her memory. She has been seen by Neurology and a brain MRI has been ordered per patient. Lymphedema Life Impact Scale: Score of 59 and impairment percentage of 87%. See scanned document.   Past Medical History:   Past Medical History:   Diagnosis Date    Arthritis     HANDS AND KNEES    Asthma     Breast cancer ([facility] [address] 03/31/2017    Carcinoma in situ    Cancer ([facility] [address] 2016    RIGHT breast cancer    Depression     GERD (gastroesophageal reflux disease)     Heart murmur     Hypertension     Neuropathy     PUD (peptic ulcer disease)     S/P radiation therapy 2016 and chemo finished all 1/2017    Vertigo      Past Surgical History:   Procedure Laterality Date    BREAST SURGERY PROCEDURE UNLISTED Right 5/17/16    RIGHT axillary lymph node dissection and PAC insertion    HX BREAST BIOPSY Right     SEVERAL TIMES    HX BREAST LUMPECTOMY Right 3/31/2016    RIGHT BREAST LUMPECTOMY, RIGHT SENTINEL NODE BIOPSY WITH ULTRASOUND performed by Shagufta Whittaker MD at [address] HX CHOLECYSTECTOMY      HX GI      COLONOSCOPY    HX GYN      cesarian x 3    HX HYSTERECTOMY      Still has ovaries    HX ORTHOPAEDIC Left     ARM FX WITH PINS    HX TONSILLECTOMY      HX UROLOGICAL      CYSTOSCOPY    HX UROLOGICAL      BLADDER SLING    KAREN STEREO  BX BREAST RT 1ST LESION W/CLIP AND SPECIMEN Right 2013    benign    KAREN US BX BREAST RT 1ST LESION W/CLIP AND SPECIMEN Right 01/29/2016    Ca in situ     Current Medications:    Current Outpatient Prescriptions   Medication Sig    hydroCHLOROthiazide (MICROZIDE) 12.5 mg capsule TK 1 C PO QD IN THE MORNING    pyridoxine, vitamin B6, (VITAMIN B-6) 25 mg tablet Take 25 mg by mouth daily.  Vitamin B 12      Iron       No current facility-administered medications for this encounter. Patient did not bring an updated medication list to the appointment today. She is unable to recall her other daily medications and will bring an updated list next visit. Allergies: Allergies   Allergen Reactions    Adhesive Tape-Silicones Rash and Itching    Darvocet A500 [Propoxyphene N-Acetaminophen] Nausea Only and Other (comments)     headache    Diazepam Other (comments)     Not an allergy, just does not work for her at all.     Hydrocodone Nausea and Vomiting and Vertigo    Percocet [Oxycodone-Acetaminophen] Nausea Only and Other (comments)     headache    Tylenol-Codeine #2 Itching, Nausea Only and Other (comments)     HEADACHES        Social/Work History and Prior Level of Function:   Living Situation: , lives with 6year old son and  in a 2 story home. Currently the caregiver for her 3year old grandson. Trainable Caregiver?:  and son as needed   Self-care/ADLs: modified independent with occasional assistance for donning and doffing pants and shoes due to swelling. Mobility: Independent with patient reporting at least 2 falls within the past 3 months. Sleeping Arrangement:  Sleeps in a bed    Adaptive Equipment Owned: none   Other: n/a  Previous Therapy:  Patient treated in the [facility] Lymphedema Clinic from 9/16/2016 until 11/3/2016  Compression/Lymphedema Equipment:  Ordered patient for a R UE Juzo Soft 2001 arm sleeve and gauntlet, 20 to 30 mmHg, and B LE [place] Callie knee high stockings, 20 to 30 mmHg, in 2016 but patient never returned to the clinic for garment fitting. SUBJECTIVE:     Patients goals for therapy: \"to eliminate swelling and pain\" of the R UE and B LE's    EVALUATION AND OBJECTIVE DATA SUMMARY:   Pain:  Pain Intensity 1: 7   Patient reports that the R UE and hand are tender to touch but no pain currently. Pain Location 1:  Leg     Pain Orientation 1: Left, Right      Pain increases with movement and decreases with rest.        Self-Care and ADLs:  Grooming: mod I Bathing: showers   UB Dressing: mod I LB Dressing: mod I with assistance PRN for donning pants due to LE swelling and heaviness. Don/Doff Shoes/Socks: Mod I with assistance PRN for shoes and socks due to LE swelling and heaviness.   Toileting: I   Other: pt able to drive to her appointment today    Skin and Tissue Assessment:  Dermal Status:  (x )  Intact ( )  Dry   ( )  Tenuous ( )  Flaky   ( )  Wound/lesion present (x )  Scars-R lateral chest and axilla   ( )  Dermatitis    Texture/Consistency:  (x )  Boggy- upper arm and lateral chest wall on the R, bilateral thighs, and right ankle ( )  Pitting Edema   ( )  Brawny ( )  Combination   ( )  Fibrotic/Woody    Pigmentation/Color Change:  (x )  Normal ( )  Hemosiderin ( )  Red ( )  Erythematous   ( )  Hyperpigmented ( )  Hyperlipodermatosclerosis   Anomalies:  ( )  Lymphorrhea ( )  Vesicles   ( )  Petechiae ( )  Warty Vercusis   ( )  Bullae ( )  Papilloma   Circulatory:  ( )  ИВАН ( )  Varicosities:   ( )  Pulses ( x)  Vascular studies ruled out DVT in past (2016)   ( )  DVT History    Nails:  ( x)  Normal  ( )  Fungus  Stemmers Sign: negative R dorsal hand and feet  Height:  Height: 5' 3\" (160 cm)  Weight:  Weight: 109 kg (240 lb 6.4 oz)   BMI:  BMI (calculated): 42.6  (36 or greater: adversely affecting lymphedema)  Wound/Ulcer:  none      Wound Pain:   n/a  Volumetric Measurements: UEs  Right: 3977.02 ml this visit. 4,322 mL (9/16/2016) Left:  3878.80 ml this visit. 3,840 mL (9/16/2016)   % Difference: 2.53% this visit. 13%(9/16/2016) Dominance: R   (See scanned graph)    B LE girth measurements:   Left fifth tuberosity: 24.1 cm  Right fifth tuberosity:  23.7 cm  Left ankle:  23.7 cm   Left ankle:  24.5 cm  Left calf:  44.8 cm   Right calf:  46.7 cm  Left Mid knee: 41.3 cm   Right Mid knee: 41.9 cm  Left thigh:  70.1 cm   Right thigh: 69.1 cm    Range of Motion: within functional limits all extremities   Strength: WFL   Sensation:  Intact   Mobility:    Bed/Chair Mobility:  I Transfers: I   Sitting Balance:  normal Standing Balance:  Normal. No loss of balance in clinic. Patient reports recent falls. Gait:  I gait without any assistive devices for community distances Wheelchair Mobility:  n/a   Endurance:  good Stairs:  Manages steps in her home with a railing without any difficulty       Safety:  Patient is alert and oriented:  yes   Safety awareness:  good   Fall Risk?:  Yes - patient reports 2 falls within the last 3 months. No balance deficits noted in the clinic.     Patient given written fall prevention handout: Yes   Precautions:  Standard lymphedema precautions to include avoiding blood pressure readings, injections and IVs or other procedures/acts that could lead to broken skin on affected area, and avoiding excessive heat, resistive activity or altitude without compression garment    Evaluation Time: 10:15 - 10:30 am     TREATMENT PROVIDED:   1. Treatment description:  Therapeutic Exercise and Procedure: Patient instructed in activity restrictions and exercise guidelines. Therapist demonstrated deep abdominal breathing and a remedial lymphedema range of motion exercise program to be done in sitting. Patient was able to complete the routine times 5 reps and deep abdominal breathing with fair performance. Patient has been asked to complete breathing exercises and the decongestive exercise routine daily. Provided patient with a written HEP to follow at home. Encouraged patient to initiate a walking program up to 25 minutes per day as tolerated with  if needed for safety. Patient reports 2 recent falls. Treatment time:  11:15 - 11:30 am  Minutes: 15    2. Treatment description:  Manual Therapy: Patient instructed in skin care principles and anatomy of the lymphatic system. Therapist able to demonstrate manual lymphatic drainage techniques for the drainage of R UE/ chest and B LE's and skin care protocol. Continued education in prevention and treatment of skin injuries and signs and symptoms of cellulitis. Continued education in daily skin care with a low Ph lotion using MLD techniques. Patient voiced interest in ordering compression for the R UE and possibly B LE's due to increase in swelling and will need to secure a vendor and check benefits prior to patient's next treatment. Provided education in resting in bed for 15 minute intervals several times per day as able. Discussed treatment components and goals of the CDT program and patient is interested in pursuing treatment at this time. Treatment time:  10:30 - 11:15 am  Minutes: 45    ASSESSMENT:   Pb Evans is a 49 y.o. female who presents with R UE and chest secondary lymphedema due to breast CA. She also states that she has LE swelling with girth measurements taken today. Patient will benefit from complete decongestive therapy (CDT) including manual lymphatic drainage (MLD) technique, short-stretch textile bandages/compression system to decongest limb, and kinesiotaping as appropriate. Patient will receive instruction in proper skin care to recognize signs/symptoms of and prevent infection, therapeutic exercise, and self-MLD for independent home program and restorative lymphatic performance. This care is medically necessary due to the infection risk with lymphedema and to improve functional activities. CDT is necessary to resolve swelling to allow patient to return to wearing normal clothes/footwear and prevent worsening of symptoms, such as venous stasis ulcerations, infections, or hospitalizations. Patient will be independent with home program strategies to allow improved ADL ability and mobility and to allow patient to return to greatest functional independence. Rehabilitation potential is considered to be fair. Factors which may influence rehabilitation potential include questionable attendance as patient  was a \"no show\" for first scheduled appointment and 2 hours late for today's visit and has a history of several \"no shows\" when treated in the Lymphedema Clinic in 2016. Patient will benefit from 4 to 8 physical therapy visits over 12 weeks to optimize improvement in these areas.     PLAN OF CARE:   Recommendations and Planned Interventions:  Manual lymph drainage/complete decongestive therapy  Multi-layer compression bandaging (short-stretch)  Compression garment fitting/provision  Lymphedema therapeutic exercise  AROM/PROM/Strength/Coordination  Self-care training  Functional mobility training  Education in skin care and lymphedema precautions  Self-MLD education per home program  Self-bandaging education per home program  Caregiver education as needed  Wound care as needed     GOALS  Short term goals  Time frame: to be met by 11/9/2018  1. Patient will demonstrate knowledge of signs/symptoms of infections/cellulitis and be independent in skin care to prevent cellulitis. 2.  Patient will demonstrate independence in lymphedema home program of therapeutic exercises to improve circulation and decongest limb to improve ADLs. 3.  Patient will participate in the selection process to allow ordering of home compression system (daytime, nighttime garments and pump as needed). Long term goals  Time frame: to be met by 12/31/2018  1. Pt will show improvement in Lymphedema Life Impact Scale by decreasing impairment percentage to under 65% and thus allow improvement in patient's quality of life. 2.  Patient will be independent with don/doff of compression system and use in order to prevent reaccumulation of fluid at discharge. 3.  Pt will be independent in self-MLD and show stable limb volumes showing decongestion and pt. ready for transition to independent restorative phase of lymphedema therapy. Patient has participated in goal setting and agrees to work toward plan of care. Patient was instructed to call if questions or concerns arise. Thank you for this referral.  Aly Angelo, PT, CLT   Time Calculation: 75 mins    TREATMENT PLAN EFFECTIVE DATES:   10/5/2018 TO 12/31/2018  I have read the above plan of care for Gi Jiang. I certify the above prescribed services are required by this patient and are medically necessary.   The above plan of care has been developed in conjunction with the lymphedema/physical therapist.       Physician Signature: ____________________________________Date:______________                                           Carmina Contreras MD

## 2018-10-16 ENCOUNTER — HOSPITAL ENCOUNTER (OUTPATIENT)
Dept: PHYSICAL THERAPY | Age: 48
Discharge: HOME | End: 2018-10-16
Payer: SELF-PAY

## 2018-10-16 VITALS — HEIGHT: 63 IN | BODY MASS INDEX: 41.75 KG/M2 | WEIGHT: 235.6 LBS

## 2018-10-16 PROCEDURE — 97140 MANUAL THERAPY 1/> REGIONS: CPT

## 2018-10-16 NOTE — PROGRESS NOTES
[facility]  Physical Therapy Lymphedema Clinic  [address], [address], [place]  [address]    LYmphedema Therapy  Visit: 2    [x]                  Daily note               []                 30 day/10th visit progress note    NAME: Adalgisa Dee  DATE: 10/16/2018    GOALS  Short term goals  Time frame: to be met by 11/9/2018  1. Patient will demonstrate knowledge of signs/symptoms of infections/cellulitis and be independent in skin care to prevent cellulitis. Continued education in daily skin care and signs and symptoms of cellulitis. 2.  Patient will demonstrate independence in lymphedema home program of therapeutic exercises to improve circulation and decongest limb to improve ADLs. Patient has been educated in the Active ROM routine in a previous visit and has the written instructions to follow. Patient has been instructed to perform a complete routine daily. 3.  Patient will participate in the selection process to allow ordering of home compression system (daytime, nighttime garments and pump as needed). Patient was measured for day time garments this visit, including: One Juzo Soft 2001 arm sleeve, 20 to 30 mmHg, size 4 max/regular length in the color black, one Juzo 2301 ACFS basic seamless glove, 20 to 30 mmHg, size large/black, and one pair of Juzo Soft 2001 knee high stockings without silicone, size 3/short, closed toe color black. Patient is agreeable to pay for the garments as she is aware that her insurance does not provide coverage of the garments.      Long term goals  Time frame: to be met by 12/31/2018  1. Pt will show improvement in Lymphedema Life Impact Scale by decreasing impairment percentage to under 65% and thus allow improvement in patient's quality of life. 2.  Patient will be independent with don/doff of compression system and use in order to prevent reaccumulation of fluid at discharge.   3.  Pt will be independent in self-MLD and show stable limb volumes showing decongestion and pt. ready for transition to independent restorative phase of lymphedema therapy. Full volumetric measurements deferred today. R UE and B LE girth measurements taken today. SUBJECTIVE REPORT:  Patient reports that she called her PCP and requested a [de-identified] Order so that she can be evaluated and treated for B LE swelling (along with R UE treatment) in the Lymphedema Clinic. Patient reports that she is interested in ordering day time garments for the R UE and also for B LE's. Pain:  Pain Scale 1: Numeric (0 - 10)     Pain Intensity 1: 0           Gait:    [x]  Independent gait without any assistive device for community distances. Patient does have a history of falls. ADLs:  Modified independence with occasional assistance for LE body dressing and donning and doffing shoes due to LE swelling. Treatment Response:  Patient is performing an UE decongestive exercise routine a few times per week and daily skin care. Patient is trying to be more active and has lost 5 lbs since her last visit. She is interested in ordering compression garments for the R UE and the LE's. [x]   Patient reviewed packet received at evaluation  []   Patient completed home program as prescribed   [x]   Patient not fully compliant with home program to date - encouraged a daily exercise routine. [x]   Patient waiting on compression garment arrival - garment order placed today. Function: Patient is not currently working. She is independent with ambulation and modified independent with ADL's except occasional assistance for LE dressing and donning shoes/socks. []   Patient requiring less assistance with completion of home program  []   ADLs are requiring less assistance   []   Patient able to return to work/leisure activities  Lymphedema Life Impact Scale: Deferred  Weight: 235.6 this visit. Weight was 240.4 lb on 10/5/2018.    TREATMENT AND OBJECTIVE DATA SUMMARY: Patient/Family Education:      Educated in skin care: [x]   Skin care products  [x]   Hygiene  [x]  Prevention of cellulitis  []   Wound care     Educated in exercise: [x]   Walking program  []   Alice ball routine  []   Stick routine  [x]   ROM routine     Instructed in self MLD:   Continued education in daily skin care using MLD techniques and patient was educated in R UE MLD sequence with Right AI omitted due to LE swelling. Will provide instruction in B LE MLD in a future visit. Instructed in don/doff of compression system:   []   Multi layer bandage (MLB) donning principles and wear precautions  [x]   Day garments - provided patient with samples of UE and LE day time garments. Patient has purchased and worn day time garments for the R UE and B LE's in the past and was independent with donning and doffing the garments. Will assess in a future visit. [x]   Night garments - deferred at this time. Therapeutic Activity 0 minutes   Treatment time: N/A  Functional Mobility: Patient is not currently working. She is independent with ambulation and modified independent with ADL's with occasional assistance for LE dressing and donning/doffing shoes and socks due to LE swelling. Drives. Fall risk: Moderate due to recent history of falls. Therapeutic Exercise/Procedure 0 minutes   Treatment time: N/A  Alice ball exercise program: Deferred   Stick exercise program: Deferred   Free exercises/ROM: Patient has been educated in the Active ROM routine and has the written HEP to follow. Encouraged patient to complete the full routine daily.     Home program: Patient to perform daily to BID:  [x]   Skin care  [x]   Deep abdominal breathing  [x]   Exercise routine  [x]   Walking program  [x]   Rest in supine   []   Compression bandage  [x]   Compression garments - bring to the clinic for fitting   []   Vasopneumatic device  []   Wound care  [x]   Self MLD   [x]   Bring supplies to each therapy visit  [] Purchase necessary supplies  []   Weight loss program  []   Follow up with       Rationale: Exercise will increase the lymph angiomotoricity and tissue pressure of the skin and thus decrease swelling. Modalities 0 minutes   Treatment time: N/A  Vasopneumatic pump: Will contact the vendor, [employer], to check insurance benefits. Manual Lymphatic Drainage (MLD) 75 minutes   Treatment time: 1:15 - 2:45 pm   Area to decongest:    [x] UE          [x]  Right     []  Left      [x] Trunk      [x] LE  (Will address in a future visit)           [x]  Right     [x]  Left      [x] Trunk         Sequence used and effectiveness: [x] Secondary sequence for R upper extremity with / without trunk involvement with right AI omitted due to LE swelling. [] Secondary/Primary sequence for lower extremities with / without trunk involvement     [] Modifications were made to manual lymph drainage sequence to exclude cervical techniques secondary to patient's age    [x] Self MLD sequence/techniques/hand strokes - self MLD reviewed with patient this visit. Patient instructed to perform self MLD daily. Skin/wound care/debridement: Skin is intact. Boggy proximal R UE and B LE's thigh to ankles. Upper/Lower extremity compression: Patient was provided with samples of compression garments in the clinic and is aware that her insurance will not provide coverage of the day time garments. The patient wishes to pursue ordering the following garments:  One Juzo Soft 2001 arm sleeve, 20 to 30 mmHg, size 4max/regular length, color: black, and one Juzo 2301 ACFS Basic Seamless Glove, 20 to 30 mmHg, size large black, and one pair of closed toe Juzo Soft 2001 knee high stockings,size 3/short, color black from the vendor, [employer]. Patient was instructed to bring the garments to the clinic for fitting. Initiated education in the care of, precautions, and wear schedule of the garments.         Kinesiotaping: N/a Girth/Volume measurement: Affected Side: R UE    Right (cm)   Base 3rd finger 7.0      Wrist 17.8      Elbow 31.7   Axilla 41.8            RLE girth measurements: Ankle: 24.1cm  Calf: 46.7 cm    L LE girth measurements: Ankle: 23.4 cm  Calf: 44.5 cm         TOTAL TREATMENT 75 mins     Circumferential Limb Measurements:  R UE and B LE girth measurements taken today. See measurements above. ASSESSMENT:   Treatment effectiveness and tolerance: Patient is attempting to be more active and has lost 5 lbs since her last visit. Patient has also obtained a Physicians Order from her PCP for treatment of  B LE swelling (along with R UE swelling) in the Lymphedema Clinic and she is ready to order daytime garments for both the R UE and B LE's. Patient is aware that her insurance coverage will not cover the garments and is agreeable to purchase the garments from the vendor, [employer], and bring them to the clinic for fitting. Progress toward goals: See goal section of note. PLAN OF CARE:   Changes to the plan of care: Continue per plan of care established on evaluation. Frequency: []  2 times a week  []  Weekly  [x]  Biweekly  []  Monthly   Other: Vendor:  [employer]       Lizzie Klinefelter, PT, CLT

## 2018-11-25 ENCOUNTER — HOSPITAL ENCOUNTER (EMERGENCY)
Age: 48
Discharge: HOME | End: 2018-11-25
Attending: EMERGENCY MEDICINE | Admitting: EMERGENCY MEDICINE
Payer: COMMERCIAL

## 2018-11-25 ENCOUNTER — APPOINTMENT (OUTPATIENT)
Dept: GENERAL RADIOLOGY | Age: 48
End: 2018-11-25
Attending: EMERGENCY MEDICINE
Payer: COMMERCIAL

## 2018-11-25 VITALS
RESPIRATION RATE: 18 BRPM | SYSTOLIC BLOOD PRESSURE: 173 MMHG | OXYGEN SATURATION: 97 % | HEART RATE: 111 BPM | TEMPERATURE: 99 F | DIASTOLIC BLOOD PRESSURE: 106 MMHG

## 2018-11-25 DIAGNOSIS — T07.XXXA ABRASIONS OF MULTIPLE SITES: ICD-10-CM

## 2018-11-25 DIAGNOSIS — S40.011A CONTUSION OF RIGHT SHOULDER, INITIAL ENCOUNTER: ICD-10-CM

## 2018-11-25 DIAGNOSIS — S00.83XA CONTUSION OF FACE, INITIAL ENCOUNTER: ICD-10-CM
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
## 2018-11-25 PROCEDURE — 99284 EMERGENCY DEPT VISIT MOD MDM: CPT

## 2018-11-25 PROCEDURE — 75810000275 HC EMERGENCY DEPT VISIT NO LEVEL OF CARE

## 2018-11-25 PROCEDURE — 74011250637 HC RX REV CODE- 250/637: Performed by: EMERGENCY MEDICINE

## 2018-11-25 PROCEDURE — 73030 X-RAY EXAM OF SHOULDER: CPT

## 2018-11-25 PROCEDURE — 77030018846 HC SOL IRR STRL H20 ICUM -A

## 2018-11-25 PROCEDURE — 74011000250 HC RX REV CODE- 250: Performed by: EMERGENCY MEDICINE

## 2018-11-25 RX ORDER — IBUPROFEN 800 MG/1
800 TABLET ORAL
Status: COMPLETED | OUTPATIENT
Start: 2018-11-25 | End: 2018-11-25

## 2018-11-25 RX ORDER — IBUPROFEN 800 MG/1
800 TABLET ORAL
Qty: 30 TAB | Refills: 0 | Status: SHIPPED | OUTPATIENT
Start: 2018-11-25

## 2018-11-25 RX ORDER — BACITRACIN 500 UNIT/G
1 PACKET (EA) TOPICAL
Status: COMPLETED | OUTPATIENT
Start: 2018-11-25 | End: 2018-11-25

## 2018-11-25 RX ORDER — METHOCARBAMOL 500 MG/1
500 TABLET, FILM COATED ORAL 3 TIMES DAILY
Qty: 20 TAB | Refills: 0 | Status: SHIPPED | OUTPATIENT
Start: 2018-11-25 | End: 2019-07-31

## 2018-11-25 RX ADMIN — BACITRACIN 1 PACKET: 500 OINTMENT TOPICAL at 03:11

## 2018-11-25 RX ADMIN — IBUPROFEN 800 MG: 800 TABLET ORAL at 03:11

## 2018-11-25 NOTE — FORENSIC NURSE
Forensic exam complete with photographs and evidence collection. [facility] responded to see patient. Exam authorized by law enforcement. Patient denies safety concerns upon discharge from emergency department. Patient was provided information on obtaining protective order. SBAR report given to Eugenia Beatty RN; care of patient relinquished to RN.

## 2018-11-25 NOTE — DISCHARGE INSTRUCTIONS
Head Injury: Care Instructions  Your Care Instructions    Most injuries to the head are minor. Bumps, cuts, and scrapes on the head and face usually heal well and can be treated the same as injuries to other parts of the body. Although it's rare, once in a while a more serious problem shows up after you are home. So it's good to be on the lookout for symptoms for a day or two. Follow-up care is a key part of your treatment and safety. Be sure to make and go to all appointments, and call your doctor if you are having problems. It's also a good idea to know your test results and keep a list of the medicines you take. How can you care for yourself at home? · Follow your doctor's instructions. He or she will tell you if you need someone to watch you closely for the next 24 hours or longer. · Take it easy for the next few days or more if you are not feeling well. · Ask your doctor when it's okay for you to go back to activities like driving a car, riding a bike, or operating machinery. When should you call for help? Call 911 anytime you think you may need emergency care. For example, call if:    · You have a seizure.     · You passed out (lost consciousness).     · You are confused or can't stay awake.    Call your doctor now or seek immediate medical care if:    · You have new or worse vomiting.     · You feel less alert.     · You have new weakness or numbness in any part of your body.    Watch closely for changes in your health, and be sure to contact your doctor if:    · You do not get better as expected.     · You have new symptoms, such as headaches, trouble concentrating, or changes in mood. Where can you learn more? Go to http://pascual-senthil.info/. Enter N006 in the search box to learn more about \"Head Injury: Care Instructions. \"  Current as of: June 4, 2018  Content Version: 11.8  © 4486-7440 Healthwise, Incorporated.  Care instructions adapted under license by [facility] (which disclaims liability or warranty for this information). If you have questions about a medical condition or this instruction, always ask your [id number]     Contusion: Care Instructions  Your Care Instructions    Contusion is the medical term for a bruise. It is the result of a direct blow or an impact, such as a fall. Contusions are common sports injuries. Most people think of a bruise as a black-and-blue spot. This happens when small blood vessels get torn and leak blood under the skin. But bones, muscles, and organs can also get bruised. This may damage deep tissues but not cause a bruise you can see. The doctor will do a physical exam to find the location of your contusion. You may also have tests to make sure you do not have a more serious injury, such as a broken bone or nerve damage. These may include X-rays or other imaging tests like a CT scan or MRI. Deep-tissue contusions may cause pain and swelling. But if there is no serious damage, they will often get better in a few weeks with home treatment. The doctor has checked you carefully, but problems can develop later. If you notice any problems or new symptoms, get medical treatment right away. Follow-up care is a key part of your treatment and safety. Be sure to make and go to all appointments, and call your doctor if you are having problems. It's also a good idea to know your test results and keep a list of the medicines you take. How can you care for yourself at home? · Put ice or a cold pack on the sore area for 10 to 20 minutes at a time to stop swelling. Put a thin cloth between the ice pack and your skin. · Be safe with medicines. Read and follow all instructions on the label. ? If the doctor gave you a prescription medicine for pain, take it as prescribed. ? If you are not taking a prescription pain medicine, ask your doctor if you can take an over-the-counter medicine.   · If you can, prop up the sore area on pillows as much as possible for the next few days. Try to keep the sore area above the level of your heart. When should you call for help? Call your doctor now or seek immediate medical care if:    · Your pain gets worse.     · You have new or worse swelling.     · You have tingling, weakness, or numbness in the area near the contusion.     · The area near the contusion is cold or pale.    Watch closely for changes in your health, and be sure to contact your doctor if:    · You do not get better as expected. Where can you learn more? Go to http://pascual-senthil.info/. Enter Z799 in the search box to learn more about \"Contusion: Care Instructions. \"  Current as of: November 20, 2017  Content Version: 11.8  © 7886-8588 [employer]. Care instructions adapted under license by [employer] (which disclaims liability or warranty for this information). If you have questions about a medical condition or this instruction, always ask your healthcare professional. [place] [address] any warranty or liability for your use of this information. Scrapes (Abrasions): Care Instructions  Your Care Instructions  Scrapes (abrasions) are wounds where your skin has been rubbed or torn off. Most scrapes do not go deep into the skin, but some may remove several layers of skin. Scrapes usually don't bleed much, but they may ooze pinkish fluid. Scrapes on the head or face may appear worse than they are. They may bleed a lot because of the good blood supply to this area. Most scrapes heal well and may not need a bandage. They usually heal within 3 to 7 days. A large, deep scrape may take 1 to 2 weeks or longer to heal. A scab may form on some scrapes. Follow-up care is a key part of your treatment and safety. Be sure to make and go to all appointments, and call your doctor if you are having problems.  It's also a good idea to know your test results and keep a list of the medicines you take.  How can you care for yourself at home? · If your doctor told you how to care for your wound, follow your doctor's instructions. If you did not get instructions, follow this general advice:  ? Wash the scrape with clean water 2 times a day. Don't use hydrogen peroxide or alcohol, which can slow healing. ? You may cover the scrape with a thin layer of petroleum jelly, such as Vaseline, and a nonstick bandage. ? Apply more petroleum jelly and replace the bandage as needed. · Prop up the injured area on a pillow anytime you sit or lie down during the next 3 days. Try to keep it above the level of your heart. This will help reduce swelling. · Be safe with medicines. Take pain medicines exactly as directed. ? If the doctor gave you a prescription medicine for pain, take it as prescribed. ? If you are not taking a prescription pain medicine, ask your doctor if you can take an over-the-counter medicine. When should you call for help? Call your doctor now or seek immediate medical care if:    · You have signs of infection, such as:  ? Increased pain, swelling, warmth, or redness around the scrape. ? Red streaks leading from the scrape. ? Pus draining from the scrape. ? A fever.     · The scrape starts to bleed, and blood soaks through the bandage. Oozing small amounts of blood is normal.    Watch closely for changes in your health, and be sure to contact your doctor if the scrape is not getting better each day. Where can you learn more? Go to http://pascual-senthil.info/. Enter A374 in the search box to learn more about \"Scrapes (Abrasions): Care Instructions. \"  Current as of: November 20, 2017  Content Version: 11.8  © 4957-9065 [employer]. Care instructions adapted under license by [employer] (which disclaims liability or warranty for this information).  If you have questions about a medical condition or this instruction, always ask your healthcare professional. [address] any warranty or liability for your use of this information. thcare professional. [employer] disclaims any warranty or liability for your use of this information. 1  Scrapes (Abrasions): Care Instructions  Your Care Instructions  Scrapes (abrasions) are wounds where your skin has been rubbed or torn off. Most scrapes do not go deep into the skin, but some may remove several layers of skin. Scrapes usually don't bleed much, but they may ooze pinkish fluid. Scrapes on the head or face may appear worse than they are. They may bleed a lot because of the good blood supply to this area. Most scrapes heal well and may not need a bandage. They usually heal within 3 to 7 days. A large, deep scrape may take 1 to 2 weeks or longer to heal. A scab may form on some scrapes. Follow-up care is a key part of your treatment and safety. Be sure to make and go to all appointments, and call your doctor if you are having problems. It's also a good idea to know your test results and keep a list of the medicines you take. How can you care for yourself at home? · If your doctor told you how to care for your wound, follow your doctor's instructions. If you did not get instructions, follow this general advice:  ? Wash the scrape with clean water 2 times a day. Don't use hydrogen peroxide or alcohol, which can slow healing. ? You may cover the scrape with a thin layer of petroleum jelly, such as Vaseline, and a nonstick bandage. ? Apply more petroleum jelly and replace the bandage as needed. · Prop up the injured area on a pillow anytime you sit or lie down during the next 3 days. Try to keep it above the level of your heart. This will help reduce swelling. · Be safe with medicines. Take pain medicines exactly as directed. ? If the doctor gave you a prescription medicine for pain, take it as prescribed.   ? If you are not taking a prescription pain medicine, ask your doctor if you can take an over-the-counter medicine. When should you call for help? Call your doctor now or seek immediate medical care if:    · You have signs of infection, such as:  ? Increased pain, swelling, warmth, or redness around the scrape. ? Red streaks leading from the scrape. ? Pus draining from the scrape. ? A fever.     · The scrape starts to bleed, and blood soaks through the bandage. Oozing small amounts of blood is normal.    Watch closely for changes in your health, and be sure to contact your doctor if the scrape is not getting better each day. Where can you learn more? Go to http://pascual-senthil.info/. Enter A374 in the search box to learn more about \"Scrapes (Abrasions): Care Instructions. \"  Current as of: November 20, 2017  Content Version: 11.8  © 8513-3313 Healthwise, Incorporated. Care instructions adapted under license by [employer] (which disclaims liability or warranty for this information). If you have questions about a medical condition or this instruction, always ask your healthcare professional. [place] [address] any warranty or liability for your use of this information.
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
## 2018-11-28 ENCOUNTER — HOSPITAL ENCOUNTER (EMERGENCY)
Age: 48
Discharge: HOME | End: 2018-11-28
Attending: STUDENT IN AN ORGANIZED HEALTH CARE EDUCATION/TRAINING PROGRAM
Payer: SELF-PAY

## 2018-11-28 VITALS
TEMPERATURE: 97.8 F | BODY MASS INDEX: 44.37 KG/M2 | WEIGHT: 235 LBS | OXYGEN SATURATION: 96 % | HEIGHT: 61 IN | SYSTOLIC BLOOD PRESSURE: 116 MMHG | DIASTOLIC BLOOD PRESSURE: 79 MMHG | RESPIRATION RATE: 16 BRPM | HEART RATE: 91 BPM
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
## 2018-11-28 PROCEDURE — 75810000275 HC EMERGENCY DEPT VISIT NO LEVEL OF CARE

## 2018-11-28 PROCEDURE — 99284 EMERGENCY DEPT VISIT MOD MDM: CPT
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
## 2018-11-28 NOTE — DISCHARGE INSTRUCTIONS
Strangulation Injury: Care Instructions  Your Care Instructions  Strangulation happens when something wraps so tightly around your neck that it causes harm. Injuries may include:  · Cuts in the skin around the neck. · Damage to your voice box or windpipe. · Damage to the main arteries in the neck. · Brain damage. Your doctor can provide resources for getting help if your injury was caused by domestic abuse, depression, or other mental health issues. It may not be safe to take home information like this handout if your injury was a result of domestic abuse. Some people ask a trusted friend to keep it for them. It's also important to plan ahead and to memorize the phone numbers of places you can go for help. If you are concerned about your safety, do not use your computer, smartphone, or tablet to read about domestic abuse. Follow-up care is a key part of your treatment and safety. Be sure to make and go to all appointments, and call your doctor if you are having problems. It's also a good idea to know your test results and keep a list of the medicines you take. How can you care for yourself at home? · Put ice or a cold pack on the area for 10 to 20 minutes at a time. Put a thin cloth between the ice and your skin. · Ask your doctor if you can take an over-the-counter pain medicine, such as acetaminophen (Tylenol), ibuprofen (Advil, Motrin), or naproxen (Aleve). Be safe with medicines. Read and follow all instructions on the label. · If your doctor told you how to care for any cuts on your neck, follow your doctor's instructions. If you did not get instructions, follow this general advice:  ? Wash the cut with clean water 2 times a day. Don't use hydrogen peroxide or alcohol, which can slow healing. ? You may cover it with a thin layer of petroleum jelly, such as Vaseline, and a nonstick bandage. ? Apply more petroleum jelly and replace the bandage as needed. When should you call for help?   Call 911 anytime you think you may need emergency care. For example, call if:    · You passed out (lost consciousness).     · You have a seizure.     · You have symptoms of a brain injury, such as:  ? Changes in thinking. ? Changes in movement or feeling.     · You think that you or someone you know is in danger of being abused.     · You feel you cannot stop from hurting yourself.    Call your doctor now or seek immediate medical care if:    · You have new or worse trouble breathing.     · You have new or worse trouble swallowing.     · You cough up blood.     · You have new or increased weakness or numbness in your arms.    Watch closely for changes in your health, and be sure to contact your doctor if:    · You have problems with depression or other mental health issues.     · You do not get better as expected. Where can you learn more? Go to http://pascual-senthil.info/. Enter T614 in the search box to learn more about \"Strangulation Injury: Care Instructions. \"  Current as of: November 20, 2017  Content Version: 11.8  © 1421-1517 [employer]. Care instructions adapted under license by [employer] (which disclaims liability or warranty for this information). If you have questions about a medical condition or this instruction, always ask your healthcare professional. [address] any warranty or liability for your use of this information.

## 2018-12-03 ENCOUNTER — TELEPHONE (OUTPATIENT)
Dept: ONCOLOGY | Age: 48
End: 2018-12-03
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
## 2018-12-03 NOTE — TELEPHONE ENCOUNTER
Pt called and stated she has a lump on her shoulder and it is sore to the touch. She would like a callback.       [phone]

## 2018-12-05 NOTE — TELEPHONE ENCOUNTER
HIPAA verified. Patient state lump on top of right shoulder. Stated xray was done in ED over weekend. Advised to follow with PCP/ortho for eval.  Reviewed sx to seek breast eval (ie lump axilla or breast changes. Patient verbalized understanding and thanked for call.

## 2019-01-02 NOTE — PROGRESS NOTES
Patient was seen in the Lymphedema Clinic on 10/5/2018 and 10/16/2018 but never returned for follow up visits. Since patient's Plan of Care  on 2018, patient will be discharged from the Lymphedema Clinic at this time.

## 2019-07-31 ENCOUNTER — OFFICE VISIT (OUTPATIENT)
Dept: INTERNAL MEDICINE CLINIC | Age: 49
End: 2019-07-31

## 2019-07-31 VITALS
TEMPERATURE: 98 F | OXYGEN SATURATION: 96 % | SYSTOLIC BLOOD PRESSURE: 134 MMHG | HEART RATE: 87 BPM | RESPIRATION RATE: 16 BRPM | DIASTOLIC BLOOD PRESSURE: 85 MMHG | HEIGHT: 61 IN | WEIGHT: 237 LBS | BODY MASS INDEX: 44.75 KG/M2

## 2019-07-31 DIAGNOSIS — M17.11 OSTEOARTHRITIS OF RIGHT KNEE, UNSPECIFIED OSTEOARTHRITIS TYPE: Primary | ICD-10-CM

## 2019-07-31 DIAGNOSIS — Z01.818 PREOP EXAMINATION: ICD-10-CM

## 2019-07-31 DIAGNOSIS — Z12.11 COLON CANCER SCREENING: ICD-10-CM

## 2019-07-31 DIAGNOSIS — R73.03 PREDIABETES: ICD-10-CM

## 2019-07-31 DIAGNOSIS — D53.8 ANEMIA DUE TO VITAMIN B6 DEFICIENCY: ICD-10-CM

## 2019-07-31 DIAGNOSIS — I10 HTN (HYPERTENSION), BENIGN: ICD-10-CM

## 2019-07-31 DIAGNOSIS — E53.1 ANEMIA DUE TO VITAMIN B6 DEFICIENCY: ICD-10-CM

## 2019-07-31 DIAGNOSIS — E53.8 B12 DEFICIENCY: ICD-10-CM

## 2019-07-31 RX ORDER — LANOLIN ALCOHOL/MO/W.PET/CERES
1000 CREAM (GRAM) TOPICAL DAILY
Qty: 90 TAB | Refills: 3 | Status: SHIPPED | OUTPATIENT
Start: 2019-07-31

## 2019-07-31 RX ORDER — DICLOFENAC SODIUM 10 MG/G
GEL TOPICAL 4 TIMES DAILY
Qty: 100 G | Refills: 1 | Status: SHIPPED | OUTPATIENT
Start: 2019-07-31

## 2019-07-31 RX ORDER — PYRIDOXINE HCL (VITAMIN B6) 25 MG
25 TABLET ORAL DAILY
Qty: 90 TAB | Refills: 3 | Status: SHIPPED | OUTPATIENT
Start: 2019-07-31

## 2019-07-31 NOTE — PROGRESS NOTES
Chief Complaint   Patient presents with    Knee Pain     1. Have you been to the ER, urgent care clinic since your last visit? Hospitalized since your last visit? No    2. Have you seen or consulted any other health care providers outside of the [address] [place] [address] since your last visit? Include any pap smears or colon screening.  No

## 2019-07-31 NOTE — PROGRESS NOTES
[facility]TS [facility]ICINE [facility] PR[facility]ARE  Johnny Slade MD  [address] [address]      Chief Complaint   Patient presents with    Knee Pain       SUBJECTIVE:    Greyson Shaikh is a 51 y.o. female with osteoarthritis of the right knee here for an evaluation. She was hoping to get an injection. She was connected to an orthopedist but will not be returning to that office. She has had progressive stiffness, aching, swelling, buckling, for past 10 yrs. Had PT. Ibuprofen helps some. Patient needs referrals to cardiology and endocrinology prior to upcoming gastric sleeve procedure. She has an undiagnosed cardiac murmur and she is pre-diabetic. She  postponed bariatric surgery in 2016 when a breast carcinoma was diagnosed. Wants to withhold mammography that is now due until after bariatric surgery fearing surgery postponement again. Patient needs a screening colonoscopy. HTN is uncomplicated and stable. Current Outpatient Medications   Medication Sig Dispense Refill    diclofenac (VOLTAREN) 1 % gel Apply  to affected area four (4) times daily. 100 g 1    pyridoxine, vitamin B6, (VITAMIN B-6) 25 mg tablet Take 1 Tab by mouth daily. 90 Tab 3    cyanocobalamin 1,000 mcg tablet Take 1 Tab by mouth daily. 90 Tab 3    ibuprofen (MOTRIN) 800 mg tablet Take 1 Tab by mouth every six (6) hours as needed for Pain. 30 Tab 0    escitalopram oxalate (LEXAPRO) 10 mg tablet TK 1 T PO QD  0    hydroCHLOROthiazide (MICROZIDE) 12.5 mg capsule TK 1 C PO QD IN THE MORNING  1    cetirizine (ZYRTEC) 10 mg tablet TK 1 T PO QD  5    ferrous sulfate (FEOSOL) 325 mg (65 mg iron) tablet Take  by mouth Daily (before breakfast). Takes 3 tabs daily      busPIRone (BUSPAR) 5 mg tablet       gabapentin (NEURONTIN) 300 mg capsule daily.       zolpidem (AMBIEN) 10 mg tablet       VENTOLIN HFA 90 mcg/actuation inhaler       venlafaxine-SR (EFFEXOR-XR) 150 mg capsule        Past Medical History:   Diagnosis Date    Arthritis     HANDS AND KNEES    Asthma     Breast cancer ([facility] 03/31/2017    Carcinoma in situ    Cancer ([facility] 2016    RIGHT breast cancer    Depression     GERD (gastroesophageal reflux disease)     Heart murmur     Hypertension     Neuropathy     PUD (peptic ulcer disease)     S/P radiation therapy 2016    and chemo finished all 1/2017    Vertigo      Past Surgical History:   Procedure Laterality Date    BREAST SURGERY PROCEDURE UNLISTED Right 5/17/16    RIGHT axillary lymph node dissection and PAC insertion    HX BREAST BIOPSY Right     SEVERAL TIMES    HX BREAST LUMPECTOMY Right 3/31/2016    RIGHT BREAST LUMPECTOMY, RIGHT SENTINEL NODE BIOPSY WITH ULTRASOUND performed by Ismael Sunshine MD at [address] HX CHOLECYSTECTOMY      HX GI      COLONOSCOPY    HX GYN      cesarian x 3    HX HYSTERECTOMY      Still has ovaries    HX ORTHOPAEDIC Left     ARM FX WITH PINS    HX TONSILLECTOMY      HX UROLOGICAL      CYSTOSCOPY    HX UROLOGICAL      BLADDER SLING    KAREN STEREO  BX BREAST RT 1ST LESION W/CLIP AND SPECIMEN Right 2013    benign    KAREN US BX BREAST RT 1ST LESION W/CLIP AND SPECIMEN Right 01/29/2016    Ca in situ     Allergies   Allergen Reactions    Adhesive Tape-Silicones Rash and Itching    Darvocet A500 [Propoxyphene N-Acetaminophen] Nausea Only and Other (comments)     headache    Diazepam Other (comments)     Not an allergy, just does not work for her at all.  Hydrocodone Nausea and Vomiting and Vertigo    Percocet [Oxycodone-Acetaminophen] Nausea Only and Other (comments)     headache    Tylenol-Codeine #2 Itching, Nausea Only and Other (comments)     HEADACHES         REVIEW OF SYSTEMS:  Review of Systems   Constitutional: Positive for malaise/fatigue. HENT: Negative. Eyes: Negative. Respiratory: Negative. Cardiovascular: Positive for leg swelling. Gastrointestinal: Negative. Genitourinary: Negative.     Musculoskeletal: Positive for joint pain and myalgias. Skin: Negative. Neurological: Negative. Endo/Heme/Allergies:        Prediabetic   Psychiatric/Behavioral: Positive for depression. Social History     Socioeconomic History    Marital status:      Spouse name: Not on file    Number of children: Not on file    Years of education: Not on file    Highest education level: Not on file   Tobacco Use    Smoking status: Never Smoker    Smokeless tobacco: Never Used   Substance and Sexual Activity    Alcohol use: Yes     Alcohol/week: 0.0 standard drinks     Comment: occasionally a glass of wine    Drug use: No    Sexual activity: Yes     Partners: Male     Birth control/protection: None   Social History Narrative    Recently moved from [place], they have been here since April. . Family History   Problem Relation Age of Onset    Hypertension Mother     Kidney Disease Mother         ON DIALYSIS    Heart Disease Mother     No Known Problems Father     Asthma Son     Asthma Daughter     No Known Problems Son     Anesth Problems Neg Hx        OBJECTIVE:     Visit Vitals  /85   Pulse 87   Temp 98 °F (36.7 °C) (Oral)   Resp 16   Ht 5' 1\" (1.549 m)   Wt 237 lb (107.5 kg)   LMP 07/04/2014 (Approximate)   SpO2 96%   BMI 44.78 kg/m²     CONSTITUTIONAL: obese, no acute distress, appears age appropriate  EYES: perrla, eom intact  ENMT:moist mucous membranes, pharynx clear  NECK: supple. Thyroid normal  RESPIRATORY: Chest: clear bilaterally  CARDIOVASCULAR: Heart: regular rate and rhythm  GASTROINTESTINAL: Abdomen: soft, bowel sounds active  MUSCULOSKELETAL: Extremities: no edema, FROM at all extremities  INTEGUMENT: No unusual rashes or suspicious skin lesions noted. Nails appear normal.  NEUROLOGIC: non-focal exam   MENTAL STATUS: alert and oriented, appropriate affect     No visits with results within 3 Month(s) from this visit.    Latest known visit with results is:   Office Visit on 10/02/2018   Component Date Value Ref Range Status    WBC 10/02/2018 6.1  3.4 - 10.8 x10E3/uL Final    RBC 10/02/2018 4.45  3.77 - 5.28 x10E6/uL Final    HGB 10/02/2018 12.1  11.1 - 15.9 g/dL Final    HCT 10/02/2018 37.5  34.0 - 46.6 % Final    MCV 10/02/2018 84  79 - 97 fL Final    MCH 10/02/2018 27.2  26.6 - 33.0 pg Final    MCHC 10/02/2018 32.3  31.5 - 35.7 g/dL Final    RDW 10/02/2018 15.3  12.3 - 15.4 % Final    PLATELET 85/23/7385 017  150 - 379 x10E3/uL Final    NEUTROPHILS 10/02/2018 58  Not Estab. % Final    Lymphocytes 10/02/2018 34  Not Estab. % Final    MONOCYTES 10/02/2018 4  Not Estab. % Final    EOSINOPHILS 10/02/2018 4  Not Estab. % Final    BASOPHILS 10/02/2018 0  Not Estab. % Final    ABS. NEUTROPHILS 10/02/2018 3.5  1.4 - 7.0 x10E3/uL Final    Abs Lymphocytes 10/02/2018 2.0  0.7 - 3.1 x10E3/uL Final    ABS. MONOCYTES 10/02/2018 0.3  0.1 - 0.9 x10E3/uL Final    ABS. EOSINOPHILS 10/02/2018 0.2  0.0 - 0.4 x10E3/uL Final    ABS. BASOPHILS 10/02/2018 0.0  0.0 - 0.2 x10E3/uL Final    IMMATURE GRANULOCYTES 10/02/2018 0  Not Estab. % Final    ABS. IMM. GRANS. 10/02/2018 0.0  0.0 - 0.1 x10E3/uL Final    TIBC 10/02/2018 357  250 - 450 ug/dL Final    UIBC 10/02/2018 301  131 - 425 ug/dL Final    Iron 10/02/2018 56  27 - 159 ug/dL Final    Iron % saturation 10/02/2018 16  15 - 55 % Final    Ferritin 10/02/2018 190* 15 - 150 ng/mL Final    Vitamin B12 10/02/2018 1,253* 232 - 1,245 pg/mL Final    Folate 10/02/2018 >20.0  >3.0 ng/mL Final    Comment: A serum folate concentration of less than 3.1 ng/mL is  considered to represent clinical deficiency.       Glucose 10/02/2018 108* 65 - 99 mg/dL Final    BUN 10/02/2018 9  6 - 24 mg/dL Final    Creatinine 10/02/2018 0.83  0.57 - 1.00 mg/dL Final    GFR est non-AA 10/02/2018 84  >59 mL/min/1.73 Final    GFR est AA 10/02/2018 96  >59 mL/min/1.73 Final    BUN/Creatinine ratio 10/02/2018 11  9 - 23 Final    Sodium 10/02/2018 142  134 - 144 mmol/L Final    Potassium 10/02/2018 4.0  3.5 - 5.2 mmol/L Final    Chloride 10/02/2018 99  96 - 106 mmol/L Final    CO2 10/02/2018 23  20 - 29 mmol/L Final    Calcium 10/02/2018 10.2  8.7 - 10.2 mg/dL Final    Protein, total 10/02/2018 7.8  6.0 - 8.5 g/dL Final    Albumin 10/02/2018 4.4  3.5 - 5.5 g/dL Final    GLOBULIN, TOTAL 10/02/2018 3.4  1.5 - 4.5 g/dL Final    A-G Ratio 10/02/2018 1.3  1.2 - 2.2 Final    Bilirubin, total 10/02/2018 0.2  0.0 - 1.2 mg/dL Final    Alk. phosphatase 10/02/2018 114  39 - 117 IU/L Final    AST (SGOT) 10/02/2018 32  0 - 40 IU/L Final    ALT (SGPT) 10/02/2018 38* 0 - 32 IU/L Final    TSH 10/02/2018 2.230  0.450 - 4.500 uIU/mL Final    VITAMIN D, 25-HYDROXY 10/02/2018 31.9  30.0 - 100.0 ng/mL Final    Comment: Vitamin D deficiency has been defined by the [facility] practice guideline as a  level of serum 25-OH vitamin D less than 20 ng/mL (1,2). The Endocrine Society went on to further define vitamin D  insufficiency as a level between 21 and 29 ng/mL (2). 1. IOM ([place] of Medicine). 2010. Dietary reference     intakes for calcium and D. [facility]: The     [employer]. 2. Michael MF, Sophie BINGHAM, Sb CUNNINGHAM, et al.     Evaluation, treatment, and prevention of vitamin D     deficiency: an Endocrine Society clinical practice     guideline. JCEM. 2011 Jul; 96(7):1911-30. ASSESSMENT:   1. Osteoarthritis of right knee, unspecified osteoarthritis type -agrees to see sports medicine   2. HTN at goal   3. Preop gastric sleeve   4. Cardiac murmur   5. Prediabetes  Colon cancer screening    6. BMI 40.0-44.9, adult ([address]            Vitamin B6 deficiency            Vitamin B12 deficiency     I have discussed the diagnosis with the patient and the intended plan as seen in the  orders above. The patient understands and agees with the plan.   The patient has   received an after visit summary and questions were answered concerning  future plans  Patient labs and/or xrays were reviewed  Past records were reviewed. PLAN:  .  Orders Placed This Encounter    REFERRAL  [facility] Endocrinology ref [facility]    [facility]    Deborrah Hasten Card [facility]    diclofenac (VOLTAREN) 1 % gel    pyridoxine, vitamin B6, (VITAMIN B-6) 25 mg tablet    cyanocobalamin 1,000 mcg tablet       Follow-up and Dispositions    · Return in about 5 months (around 12/31/2019). A total of at least 45 min was spent during this evaluation of which half was spent in counseling and care coordination.

## 2019-08-14 ENCOUNTER — OFFICE VISIT (OUTPATIENT)
Dept: CARDIOLOGY CLINIC | Age: 49
End: 2019-08-14

## 2019-08-14 VITALS
HEIGHT: 61 IN | DIASTOLIC BLOOD PRESSURE: 70 MMHG | BODY MASS INDEX: 45.69 KG/M2 | WEIGHT: 242 LBS | RESPIRATION RATE: 18 BRPM | OXYGEN SATURATION: 98 % | SYSTOLIC BLOOD PRESSURE: 110 MMHG | HEART RATE: 82 BPM

## 2019-08-14 DIAGNOSIS — R01.1 MURMUR, CARDIAC: ICD-10-CM

## 2019-08-14 DIAGNOSIS — I10 ESSENTIAL HYPERTENSION: Primary | ICD-10-CM

## 2019-08-14 NOTE — PROGRESS NOTES
CardioVascular Consult    Patient: Karina Mendosa MRN: [id number]  SSN: [id number]    YOB: 1970  Age: 51 y.o. Sex: female       Subjective:      Primary Care Provider: Chely Aldana MD      Karina Mendosa is a 51 y.o.  female who presents for assessment of cardiac murmur prior to gastric surgery. This consultation was requested by Dr. Chely Aldana. The patient reports progressive right knee pain and swelling for years. Getting worse to the point surgery has been recommended. Cortisone shots have been buying time. She has had weight gain and obesity for some time. She is developing diabetes, HTN, and lymphedema and would like to have gastric sleeve to lose weight (Dr. Prosper Hendrix at [facility]). She needs pre-op cardiac assessment; however, functional assessment is somewhat limited by knee pain. She does try to walk and perform calisthenics. She tried to have gastric sleeve in 2016, but this was deferred after diagnosis of breast cancer. She had lymph node resection, radiation and chemotherapy. Blood sugars have been elevated for past 1.5 years. She couldn't tolerate metformin as she was going through menopause. No shortness of breath at rest or with exertion. She has recently been diagnosed with sleep apnea, but has not picked up machine. No chest pain. No syncope or pre-syncope. No bleeding episodes. Menopausal symptoms. No fevers or chills. Mild dependent edema.      Past Medical History:   Diagnosis Date    Arthritis     HANDS AND KNEES    Asthma     Breast cancer ([facility] [address] 03/31/2017    Carcinoma in situ    [facility]    RIGHT [facility] cancer    Depression     GERD (gastroesophageal reflux disease)     Heart murmur     Hypertension     Neuropathy     PUD (peptic ulcer disease)     S/P radiation therapy 2016    and chemo finished all 1/2017    Vertigo       Past Surgical History:   Procedure Laterality Date    BREAST SURGERY PROCEDURE UNLISTED Right 5/17/16    RIGHT axillary lymph node dissection and PAC insertion    HX BREAST BIOPSY Right     SEVERAL TIMES    HX BREAST LUMPECTOMY Right 3/31/2016    RIGHT BREAST LUMPECTOMY, RIGHT SENTINEL NODE BIOPSY WITH ULTRASOUND performed by Olamide Ríos MD at [address] HX CHOLECYSTECTOMY      HX GI      COLONOSCOPY    HX GYN      cesarian x 3    HX HYSTERECTOMY      Still has ovaries    HX ORTHOPAEDIC Left     ARM FX WITH PINS    HX TONSILLECTOMY      HX UROLOGICAL      CYSTOSCOPY    HX UROLOGICAL      BLADDER SLING    KAREN STEREO  BX BREAST RT 1ST LESION W/CLIP AND SPECIMEN Right 2013    benign    KAREN US BX BREAST RT 1ST LESION W/CLIP AND SPECIMEN Right 01/29/2016    Ca in situ     Family History   Problem Relation Age of Onset    Hypertension Mother     Kidney Disease Mother         ON DIALYSIS    Heart Disease Mother     No Known Problems Father     Asthma Son     Asthma Daughter     No Known Problems Son     Anesth Problems Neg Hx       Social History     Tobacco Use    Smoking status: Never Smoker    Smokeless tobacco: Never Used    Tobacco comment:  smokes   Substance Use Topics    Alcohol use: Yes     Alcohol/week: 0.0 standard drinks     Frequency: 2-3 times a week     Comment: occasionally a glass of wine      Current Outpatient Medications   Medication Sig    cyanocobalamin 1,000 mcg tablet Take 1 Tab by mouth daily.  ibuprofen (MOTRIN) 800 mg tablet Take 1 Tab by mouth every six (6) hours as needed for Pain.  escitalopram oxalate (LEXAPRO) 10 mg tablet TK 1 T PO QD    cetirizine (ZYRTEC) 10 mg tablet TK 1 T PO QD    ferrous sulfate (FEOSOL) 325 mg (65 mg iron) tablet Take  by mouth Daily (before breakfast). Takes 3 tabs daily    busPIRone (BUSPAR) 5 mg tablet     gabapentin (NEURONTIN) 300 mg capsule daily.     zolpidem (AMBIEN) 10 mg tablet     VENTOLIN HFA 90 mcg/actuation inhaler     diclofenac (VOLTAREN) 1 % gel Apply  to affected area four (4) times daily.  pyridoxine, vitamin B6, (VITAMIN B-6) 25 mg tablet Take 1 Tab by mouth daily.  hydroCHLOROthiazide (MICROZIDE) 12.5 mg capsule TK 1 C PO QD IN THE MORNING    venlafaxine-SR (EFFEXOR-XR) 150 mg capsule      No current facility-administered medications for this visit. Allergies   Allergen Reactions    Adhesive Tape-Silicones Rash and Itching    Darvocet A500 [Propoxyphene N-Acetaminophen] Nausea Only and Other (comments)     headache    Diazepam Other (comments)     Not an allergy, just does not work for her at all.  Hydrocodone Nausea and Vomiting and Vertigo    Percocet [Oxycodone-Acetaminophen] Nausea Only and Other (comments)     headache    Tylenol-Codeine #2 Itching, Nausea Only and Other (comments)     HEADACHES          Review of Symptoms:  Constitutional: No fevers or chills. HEET: No trauma. No visual changes. No hearing loss. No sore throat. Neck: No adenopathy or swelling. Heart: No chest pain or palpitations. Lungs: No shortness of breath. No cough. Abdomen: No pain. No nausea of vomiting. No BRBPR or melena. No diarrhea. Urology: No dysuria or hematuria. Ext: No edema. Right knee pain. Skin: No acute rashes or ulcers. Endocrine: No heat or cold intolerance. Neuro: No transient neurologic deficits. Neuropathy in hands. Clumsy hands. Subjective:     Visit Vitals  /70 (BP 1 Location: Left arm, BP Patient Position: Sitting)   Pulse 82   Resp 18   Ht 5' 1\" (1.549 m)   Wt 242 lb (109.8 kg)   SpO2 98%   BMI 45.73 kg/m²     Physical Exam:  Head: Normocephalic, atraumatic. Eyes: Pupils equal, round, reactive to light and accomodation. , Extra ocular muscles intact. Sclera anicteric. Ears: Grossly responsive to sound. Neck: No adenopathy. No bruits. Throat: No sores or erythema. Heart: Regular rate and rhythm. Normal S1 and S2. soft diastolic murmur RUSB  Lungs: Clear to auscultation bilaterally. No wheezing, rales, or rhonchi.   Abdomen: Soft, non-tender. No guarding or rebound. No hepatosplenomegaly. Bowel sounds active. Ext: No edema. No ulceration. Skin: Normal coloration. Warm and dry. No rash. Neuro: Cranial nerves II through XII intact. Motor and sensory grossly intact. Affect: Appropriate. Alert and interactive. Assessment/Plan        ICD-10-CM ICD-9-CM    1. Essential hypertension I10 401.9 AMB POC EKG ROUTINE W/ 12 LEADS, INTER & REP       Ms. Tammy Berman is a pleasant 51year old  female who presents for assessment of cardiac murmur, and pre-op assessment prior to gastric sleeve. The patient seems to be doing well in her activities of daily living, and is trying to exercise to lose weight. She denies and functional limitations at this time. No signs or symptoms of coronary insufficieny of congestive heart failure. She does, however, have exposure to XRT and CTX for breast cancer that increases her risk for structural heart disease. We will proceed with echocardiogram. I will then consider whether further cardiac evaluation is indicated. If not, she should be at average risk for surgery. I have encourage Ms. Tammy Berman to exercise and attend to diet as she is doing. Hopefully gastric sleeve and weight loss will help with control of her blood sugars, blood pressure, edema, and knee pain. Echocardiogram  Lipid panel from [facility]- Dr. Candido Boswell  Review echo results by phone  F/U 6 months to consider pre-op stress test if indicated.       Violet Hale MD  8/14/2019, 12:19 PM    [facility] Office:   [employer] Office:  [address] [place] [address] Dr    [place] [address]  P: [phone]    P: [phone]  F: [phone]    F: [phone]

## 2019-09-03 ENCOUNTER — TELEPHONE (OUTPATIENT)
Dept: CARDIOLOGY CLINIC | Age: 49
End: 2019-09-03

## 2019-09-03 NOTE — TELEPHONE ENCOUNTER
MD Tyrone Moreno, DILLAN Arriola,   Can you let her know echo shows mild aortic valve sclerosis and normal LV function. Overall looks good, and we will see at follow up. Gricel Ha      Unable to leave a message, VM box is full      Patient returned call, 2 pt identifiers used Above echo results given. Patient would like to be cleared for her surgery with Dr. Meme Yoon. (Fax # [phone] attn Harmony) She is requesting a letter be sent now prior to surgery being scheduled. Please advise    Clearance faxed.

## 2019-09-16 ENCOUNTER — TELEPHONE (OUTPATIENT)
Dept: CARDIOLOGY CLINIC | Age: 49
End: 2019-09-16

## 2019-09-16 NOTE — TELEPHONE ENCOUNTER
Please advise if patient is cleared to proceed with surgery with Dr. Bryanna Donnelly. Echo showed mild AS and normal LV function. Thanks    MD Dwight Walden RN   Caller: Unspecified (Today,  1:01 PM)             Yes. Average CV risk. Clearance letter faxed to Dr. Kenneth Piedra. My chart message sent to patient.

## 2019-09-16 NOTE — LETTER
9/16/2019 1:37 PM 
 
Ms. Ruiz Shoulder [address] [address] [address] Dear Dr. Jah Payton: Ms Star Carr is currently under the care of [address] She is average risk for cardiac complications during non-cardiac surgery. No further cardiac evaluation is indicated at this time. Please do not hesitate to contact me with questions. Sincerely, Delane Collet, MD

## 2020-07-09 ENCOUNTER — VIRTUAL VISIT (OUTPATIENT)
Dept: FAMILY MEDICINE CLINIC | Age: 50
End: 2020-07-09

## 2020-07-09 RX ORDER — TRAZODONE HYDROCHLORIDE 100 MG/1
TABLET ORAL
COMMUNITY
Start: 2020-07-07

## 2020-07-09 RX ORDER — VORTIOXETINE 20 MG/1
TABLET, FILM COATED ORAL
COMMUNITY
Start: 2020-07-07

## 2020-07-09 RX ORDER — MECLIZINE HYDROCHLORIDE 25 MG/1
TABLET ORAL
COMMUNITY
Start: 2020-04-26 | End: 2020-07-23 | Stop reason: SDUPTHER
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
## 2020-07-09 NOTE — PROGRESS NOTES
Patient was unable to establish video chat and was advised to reschedule. Felecia Luna MD  This encounter was created in error - please disregard.

## 2020-07-23 ENCOUNTER — OFFICE VISIT (OUTPATIENT)
Dept: FAMILY MEDICINE CLINIC | Age: 50
End: 2020-07-23

## 2020-07-23 VITALS
TEMPERATURE: 97.6 F | DIASTOLIC BLOOD PRESSURE: 75 MMHG | BODY MASS INDEX: 42.88 KG/M2 | HEIGHT: 63 IN | RESPIRATION RATE: 16 BRPM | OXYGEN SATURATION: 99 % | WEIGHT: 242 LBS | SYSTOLIC BLOOD PRESSURE: 113 MMHG | HEART RATE: 72 BPM

## 2020-07-23 DIAGNOSIS — T78.40XA ALLERGIC STATE, INITIAL ENCOUNTER: ICD-10-CM

## 2020-07-23 DIAGNOSIS — G43.109 MIGRAINE WITH VERTIGO: ICD-10-CM

## 2020-07-23 DIAGNOSIS — Z72.820 SLEEP DEFICIENT: ICD-10-CM

## 2020-07-23 DIAGNOSIS — Z12.31 SCREENING MAMMOGRAM, ENCOUNTER FOR: ICD-10-CM

## 2020-07-23 DIAGNOSIS — Z00.00 WELLNESS EXAMINATION: Primary | ICD-10-CM

## 2020-07-23 DIAGNOSIS — M54.2 NECK PAIN: ICD-10-CM

## 2020-07-23 RX ORDER — CETIRIZINE HCL 10 MG
TABLET ORAL
Qty: 30 TAB | Refills: 5 | Status: SHIPPED | OUTPATIENT
Start: 2020-07-23

## 2020-07-23 RX ORDER — ZOLPIDEM TARTRATE 10 MG/1
10 TABLET ORAL
Qty: 30 TAB | Refills: 0 | Status: SHIPPED | OUTPATIENT
Start: 2020-07-23 | End: 2020-08-22

## 2020-07-23 RX ORDER — MECLIZINE HYDROCHLORIDE 25 MG/1
TABLET ORAL
Qty: 10 TAB | Refills: 0 | Status: SHIPPED | OUTPATIENT
Start: 2020-07-23

## 2020-07-23 NOTE — PATIENT INSTRUCTIONS
Please return for your well labs,make sure you are fasting 12 hours. Please schedule your PAP exam with a provider in the near future. Be sure to have your mammogram and consult with GI for Colonoscopy. Call to get scheduled with PT. Learning About How to Have a Healthy Back  What causes back pain? Back pain is often caused by overuse, strain, or injury. For example, people often hurt their backs playing sports or working in the yard, being jolted in a car accident, or lifting something too heavy. Aging plays a part too. Your bones and muscles tend to lose strength as you age, which makes injury more likely. The spongy discs between the bones of the spine (vertebrae) may suffer from wear and tear and no longer provide enough cushion between the bones. A disc that bulges or breaks open (herniated disc) can press on nerves, causing back pain. In some people, back pain is the result of arthritis, broken vertebrae caused by bone loss (osteoporosis), illness, or a spine problem. Although most people have back pain at one time or another, there are steps you can take to make it less likely. How can you have a healthy back? Reduce stress on your back through good posture   Slumping or slouching alone may not cause low back pain. But after the back has been strained or injured, bad posture can make pain worse. · Sleep in a position that maintains your back's normal curves and on a mattress that feels comfortable. Sleep on your side with a pillow between your knees, or sleep on your back with a pillow under your knees. These positions can reduce strain on your back. · Stand and sit up straight. \"Good posture\" generally means your ears, shoulders, and hips are in a straight line. · If you must stand for a long time, put one foot on a stool, ledge, or box. Switch feet every now and then.   · Sit in a chair that is low enough to let you place both feet flat on the floor with both knees nearly level with your hips. If your chair or desk is too high, use a footrest to raise your knees. Place a small pillow, a rolled-up towel, or a lumbar roll in the curve of your back if you need extra support. · Try a kneeling chair, which helps tilt your hips forward. This takes pressure off your lower back. · Try sitting on an exercise ball. It can rock from side to side, which helps keep your back loose. · When driving, keep your knees nearly level with your hips. Sit straight, and drive with both hands on the steering wheel. Your arms should be in a slightly bent position. Reduce stress on your back through careful lifting   · Squat down, bending at the hips and knees only. If you need to, put one knee to the floor and extend your other knee in front of you, bent at a right angle (half kneeling). · Press your chest straight forward. This helps keep your upper back straight while keeping a slight arch in your low back. · Hold the load as close to your body as possible, at the level of your belly button (navel). · Use your feet to change direction, taking small steps. · Lead with your hips as you change direction. Keep your shoulders in line with your hips as you move. · Set down your load carefully, squatting with your knees and hips only. Exercise and stretch your back   · Do some exercise on most days of the week, if your doctor says it is okay. You can walk, run, swim, or cycle. · Stretch your back muscles. Here are a few exercises to try:  ? Lie on your back, and gently pull one bent knee to your chest. Put that foot back on the floor, and then pull the other knee to your chest.  ? Do pelvic tilts. Lie on your back with your knees bent. Tighten your stomach muscles. Pull your belly button (navel) in and up toward your ribs. You should feel like your back is pressing to the floor and your hips and pelvis are slightly lifting off the floor. Hold for 6 seconds while breathing smoothly. ?  Sit with your back flat against a wall.  · Keep your core muscles strong. The muscles of your back, belly (abdomen), and buttocks support your spine. ? Pull in your belly and imagine pulling your navel toward your spine. Hold this for 6 seconds, then relax. Remember to keep breathing normally as you tense your muscles. ? Do curl-ups. Always do them with your knees bent. Keep your low back on the floor, and curl your shoulders toward your knees using a smooth, slow motion. Keep your arms folded across your chest. If this bothers your neck, try putting your hands behind your neck (not your head), with your elbows spread apart. ? Lie on your back with your knees bent and your feet flat on the floor. Tighten your belly muscles, and then push with your feet and raise your buttocks up a few inches. Hold this position 6 seconds as you continue to breathe normally, then lower yourself slowly to the floor. Repeat 8 to 12 times. ? If you like group exercise, try Pilates or yoga. These classes have poses that strengthen the core muscles. Lead a healthy lifestyle   · Stay at a healthy weight to avoid strain on your back. · Do not smoke. Smoking increases the risk of osteoporosis, which weakens the spine. If you need help quitting, talk to your doctor about stop-smoking programs and medicines. These can increase your chances of quitting for good. Where can you learn more? Go to http://pascual-senthil.info/  Enter L315 in the search box to learn more about \"Learning About How to Have a Healthy Back. \"  Current as of: March 2, 2020               Content Version: 12.5  © 5131-4935 Healthwise, Incorporated. Care instructions adapted under license by [employer] (which disclaims liability or warranty for this information). If you have questions about a medical condition or this instruction, always ask your healthcare professional. [address] any warranty or liability for your use of this information. Back Pain in Children: Care Instructions  Your Care Instructions  Back pain has many possible causes. It is often related to problems with muscles and ligaments of the back. It may also be related to problems with the nerves, discs, or bones of the back. Moving, lifting, standing, sitting, or sleeping in an awkward way can strain the back. Sometimes children do not notice the injury until later. Although it may hurt a lot, back pain usually improves on its own within several weeks. Most children recover in 12 weeks or less. Using good home treatment and being careful not to stress the back can help your child feel better sooner. Follow-up care is a key part of your child's treatment and safety. Be sure to make and go to all appointments, and call your doctor if your child is having problems. It's also a good idea to know your child's test results and keep a list of the medicines your child takes. How can you care for your child at home? · Have your child sit or lie in positions that are most comfortable and reduce your child's pain. Your child can try one of these positions when he or she lies down. Have your child:  ? Lie on his or her back with knees bent and supported by large pillows. ? Lie on the floor with his or her legs on the seat of a sofa or chair. ? Lie on his or her side with knees and hips bent and a pillow between the legs. ? Lie on his or her stomach if it does not make pain worse. · Do not let your child sit up in bed. Your child should also avoid soft couches and twisted positions. Bed rest can help relieve pain at first, but it delays healing. Avoid bed rest after the first day. · Have your child change positions every 30 minutes. If your child must sit for long periods of time, have him or her take breaks from sitting. Have your child get up and walk around or lie in a comfortable position. · Try using a hot water bottle for 15 to 20 minutes every 2 or 3 hours.  Keep a cloth between the hot water bottle and your child's skin. · Try a warm shower in place of one session with the hot water bottle. · You can also try an ice pack on your child's back for 10 to 15 minutes at a time. Put a thin cloth between the ice pack and your child's skin. · Be safe with medicines. Give pain medicines exactly as directed. ? If the doctor gave your child a prescription medicine for pain, give it as prescribed. ? If your child is not taking a prescription pain medicine, ask your doctor if your child can take an over-the-counter medicine. · Have your child take short walks several times a day. Your child can start with 5 to 10 minutes, 3 to 4 times a day, and work up to longer walks. Your child should stick to level surfaces and avoid hills and stairs until his or her back is better. · Have your child return to activities as soon as he or she can. Continued rest without activity is usually not good for your child's back. · To prevent future back pain, ask your doctor about exercises your child can do to stretch and strengthen his or her back and stomach. Teach your child how to use good posture, safe lifting techniques, and proper body mechanics. When should you call for help? [id number] anytime you think your child may need emergency care. For example, call if:  · Your child is unable to move a leg at all. Call your doctor now or seek immediate medical care if:  · Your child has new or worse symptoms in his or her legs, belly, or buttocks. Symptoms may include:  ? Numbness or tingling. ? Weakness. ? Pain. · Your child loses bladder or bowel control. Watch closely for changes in your child's health, and be sure to contact your doctor if:  · Your child has a fever, loses weight, or doesn't feel well. · Your child is not getting better as expected. Where can you learn more?   Go to http://pascual-senthil.info/  Enter G758 in the search box to learn more about \"Back Pain in Children: Care Instructions. \"  Current as of: March 2, 2020               Content Version: 12.5  © 6542-9605 Healthwise, [employer]. Care instructions adapted under license by [employer] (which disclaims liability or warranty for this information). If you have questions about a medical condition or this instruction, always ask your healthcare professional. [address] any warranty or liability for your use of this information. Well Visit, Women 48 to 72: Care Instructions  Your Care Instructions     Physical exams can help you stay healthy. Your doctor has checked your overall health and may have suggested ways to take good care of yourself. He or she also may have recommended tests. At home, you can help prevent illness with healthy eating, regular exercise, and other steps. Follow-up care is a key part of your treatment and safety. Be sure to make and go to all appointments, and call your doctor if you are having problems. It's also a good idea to know your test results and keep a list of the medicines you take. How can you care for yourself at home? · Reach and stay at a healthy weight. This will lower your risk for many problems, such as obesity, diabetes, heart disease, and high blood pressure. · Get at least 30 minutes of exercise on most days of the week. Walking is a good choice. You also may want to do other activities, such as running, swimming, cycling, or playing tennis or team sports. · Do not smoke. Smoking can make health problems worse. If you need help quitting, talk to your doctor about stop-smoking programs and medicines. These can increase your chances of quitting for good. · Protect your skin from too much sun. When you're outdoors from 10 a.m. to 4 p.m., stay in the shade or cover up with clothing and a hat with a wide brim. Wear sunglasses that block UV rays. Even when it's cloudy, put broad-spectrum sunscreen (SPF 30 or higher) on any exposed skin.   · See a dentist one or two times a year for checkups and to have your teeth cleaned. · Wear a seat belt in the car. Follow your doctor's advice about when to have certain tests. These tests can spot problems early. · Cholesterol. Your doctor will tell you how often to have this done based on your age, family history, or other things that can increase your risk for heart attack and stroke. · Blood pressure. Have your blood pressure checked during a routine doctor visit. Your doctor will tell you how often to check your blood pressure based on your age, your blood pressure results, and other factors. · Mammogram. Ask your doctor how often you should have a mammogram, which is an X-ray of your breasts. A mammogram can spot breast cancer before it can be felt and when it is easiest to treat. · Pap test and pelvic exam. Ask your doctor how often you should have a Pap test. You may not need to have a Pap test as often as you used to. · Vision. Have your eyes checked every year or two or as often as your doctor suggests. Some experts recommend that you have yearly exams for glaucoma and other age-related eye problems starting at age 48. · Hearing. Tell your doctor if you notice any change in your hearing. You can have tests to find out how well you hear. · Diabetes. Ask your doctor whether you should have tests for diabetes. · Colorectal cancer. Your risk for colorectal cancer gets higher as you get older. Some experts say that adults should start regular screening at age 48 and stop at age 76. Others say to start before age 48 or continue after age 76. Talk with your doctor about your risk and when to start and stop screening. · Thyroid disease. Talk to your doctor about whether to have your thyroid checked as part of a regular physical exam. Women have an increased chance of a thyroid problem. · Osteoporosis. You should begin tests for bone density at age 72.  If you are younger than 72, ask your doctor whether you have factors that may increase your risk for this disease. You may want to have this test before age 72. · Heart attack and stroke risk. At least every 4 to 6 years, you should have your risk for heart attack and stroke assessed. Your doctor uses factors such as your age, blood pressure, cholesterol, and whether you smoke or have diabetes to show what your risk for a heart attack or stroke is over the next 10 years. When should you call for help? Watch closely for changes in your health, and be sure to contact your doctor if you have any problems or symptoms that concern you. Where can you learn more? Go to http://www.gray.com/  Enter [id number] in the search box to learn more about \"Well Visit, Women 50 to 72: Care Instructions. \"  Current as of: August 22, 2019               Content Version: 12.5  © 8531-5433 Healthwise, [employer]. Care instructions adapted under license by [employer] (which disclaims liability or warranty for this information). If you have questions about a medical condition or this instruction, always ask your healthcare professional. [place] [address] any warranty or liability for your use of this information. Learning About Breast Cancer Screening  What is breast cancer screening? Breast cancer occurs when cells that are not normal grow in one or both of your breasts. Screening tests can help find breast cancer early. Cancer is easier to treat when it's found early. Having concerns about breast cancer is common. That's why it's important to talk with your doctor about when to start and how often to get screened for breast cancer. How is breast cancer screening done? Several screening tests can be used to check for breast cancer. Mammograms. These tests check for signs of cancer using X-rays. They can show tumors that are too small for you or your doctor to feel.  During a mammogram, a machine squeezes your breasts to make them flatter and easier to X-ray. At least two pictures are taken of each breast. One is taken from the top and one from the side. 3-D mammograms. These tests are also called digital breast tomosynthesis. Your breast is positioned on a flat plate. A top plate is pressed against your breast to keep it in position. The X-ray arm then moves in an arc above the breast and takes many pictures. A computer uses these X-rays to create a three-dimensional image. Clinical breast exam.   In this exam, your doctor carefully feels your breasts and under your arms to check for lumps or other changes. When should you get screened? Talk with your doctor about when you should start being tested for breast cancer. How often you get tested and the kind of tests you get will depend on your age and your risk. The guidelines that follow are for women who have an average risk for breast cancer. If you have a higher risk, such as having a family history of breast cancer in multiple relatives or at a young age, your doctor may recommend different screening for you. · Ages 21 to 44: Some experts recommend that women have a clinical breast exam every 1 to 3 years, starting at age 22. Ask your doctor how often you should have this test. If you have a high risk for breast cancer, talk with your doctor about the best schedule and tests for you. · Ages 36 and older: Talk with your doctor about how often you should have mammograms and clinical breast exams. What is your risk for breast cancer? If you don't already know your risk of breast cancer, you can ask your doctor about it. You can also look it up at www.cancer.gov/bcrisktool/. If your doctor says that you have a high or very high risk, ask about ways to reduce your risk. These could include getting extra screening, taking medicine, or having surgery. If you have a strong family history of breast cancer, ask your doctor about genetic testing.   What steps can you take to stay healthy? Some things that increase your risk of breast cancer, such as your age and being female, cannot be controlled. But you can do some things to stay as healthy as you can. · Learn what your breasts normally look and feel like. If you notice any changes, tell your doctor. · If you drink alcohol, limit how much you drink. Any amount of alcohol may increase your risk for some types of cancer. · If you smoke, quit. When you quit smoking, you lower your chances of getting many types of cancer. You can also do your best to eat well, be active, and stay at a healthy weight. Eating healthy foods and being active every day, as well as staying at a healthy weight, may help prevent cancer. Where can you learn more? Go to http://pascual-senthil.info/  Enter H706 in the search box to learn more about \"Learning About Breast Cancer Screening. \"  Current as of: August 22, 2019               Content Version: 12.5  © 1406-6890 Healthwise, Incorporated. Care instructions adapted under license by [employer] (which disclaims liability or warranty for this information). If you have questions about a medical condition or this instruction, always ask your healthcare professional. [address] any warranty or liability for your use of this information.
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
## 2020-07-23 NOTE — PROGRESS NOTES
Identified pt with two pt identifiers(name and ). Reviewed record in preparation for visit and have obtained necessary documentation. Chief Complaint   Patient presents with    Letter for School/Work      pt would like a letter to be excused from going back to work. Sophie Parks Trending Taste    Medication Refill        Health Maintenance Due   Topic    Pneumococcal 0-64 years (1 of 3 - PCV13)    DTaP/Tdap/Td series (1 - Tdap)    PAP AKA CERVICAL CYTOLOGY     A1C test (Diabetic or Prediabetic)     Breast Cancer Screen Mammogram     Shingrix Vaccine Age 50> (1 of 2)    FOBT Q1Y Age 54-65        Visit Vitals  Blood Pressure 113/75 (BP 1 Location: Left arm, BP Patient Position: Sitting)   Pulse 72   Temperature 97.6 °F (36.4 °C) (Oral)   Respiration 16   Height 5' 3\" (1.6 m)   Weight 242 lb (109.8 kg)   Oxygen Saturation 99%   Body Mass Index 42.87 kg/m²         Coordination of Care Questionnaire:  :   1) Have you been to an emergency room, urgent care, or hospitalized since your last visit? NO      2. Have seen or consulted any other health care provider since your last visit? NO        Patient is accompanied by  I have received verbal consent from Zainab Ford to discuss any/all medical information while they are present in the room. Edd Henderson
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
## 2020-07-27 ENCOUNTER — TELEPHONE (OUTPATIENT)
Dept: FAMILY MEDICINE CLINIC | Age: 50
End: 2020-07-27

## 2020-07-27 NOTE — TELEPHONE ENCOUNTER
Batool Davison from [address] [place] [address],[address] of care called stating pt has history of breast cancer and mammogram order needs to be a diagnostic mammogram.  Batool Davison Phone: [phone]

## 2020-08-03 ENCOUNTER — TELEPHONE (OUTPATIENT)
Dept: FAMILY MEDICINE CLINIC | Age: 50
End: 2020-08-03

## 2020-08-07 ENCOUNTER — TELEPHONE (OUTPATIENT)
Dept: FAMILY MEDICINE CLINIC | Age: 50
End: 2020-08-07

## 2020-08-07 DIAGNOSIS — C50.911 LOBULAR CARCINOMA OF RIGHT BREAST (HCC): Primary | ICD-10-CM

## 2020-08-07 NOTE — TELEPHONE ENCOUNTER
Kathy Sanchez from [facility] called stating the order for 3d diagnostic bilateral mammogram that was placed by TOSHIA richards cannot say same day and they need code for her breast cancer.       Kathy Sanchez [phone]

## 2020-08-18 ENCOUNTER — HOSPITAL ENCOUNTER (OUTPATIENT)
Dept: MAMMOGRAPHY | Age: 50
Discharge: HOME | End: 2020-08-18
Attending: NURSE PRACTITIONER
Payer: COMMERCIAL

## 2020-08-18 DIAGNOSIS — C50.911 LOBULAR CARCINOMA OF RIGHT BREAST (HCC): ICD-10-CM

## 2020-08-18 PROCEDURE — 77062 BREAST TOMOSYNTHESIS BI: CPT

## 2020-08-19 NOTE — TELEPHONE ENCOUNTER
Left VM for pt in regards to [facility] results. Appears these have already been called to her.

## 2020-10-26 ENCOUNTER — VIRTUAL VISIT (OUTPATIENT)
Dept: ONCOLOGY | Age: 50
End: 2020-10-26
Payer: COMMERCIAL

## 2020-10-26 DIAGNOSIS — C77.3 CARCINOMA OF RIGHT BREAST METASTATIC TO AXILLARY LYMPH NODE (HCC): Primary | ICD-10-CM

## 2020-10-26 DIAGNOSIS — C50.911 CARCINOMA OF RIGHT BREAST METASTATIC TO AXILLARY LYMPH NODE (HCC): Primary | ICD-10-CM

## 2020-10-26 DIAGNOSIS — E66.01 OBESITY, MORBID (HCC): ICD-10-CM

## 2020-10-26 DIAGNOSIS — I10 ESSENTIAL HYPERTENSION: ICD-10-CM

## 2020-10-26 DIAGNOSIS — R23.2 HOT FLASHES: ICD-10-CM

## 2020-10-26 PROCEDURE — 99214 OFFICE O/P EST MOD 30 MIN: CPT | Performed by: INTERNAL MEDICINE
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
## 2020-10-26 NOTE — LETTER
10/26/2020 3:58 PM 
 
Patient:  Jerome Paredes YOB: 1970 Date of Visit: 10/26/2020 Dear No Recipients: Thank you for referring Ms. Cristian Goodman to me for evaluation/treatment. Below are the relevant portions of my assessment and plan of care. If you have questions, please do not hesitate to call me. I look forward to following Ms. Sergio Umana along with you. Sincerely, Kaylee Gonzlaez, DO

## 2021-05-25 ENCOUNTER — OFFICE VISIT (OUTPATIENT)
Dept: NEUROLOGY | Age: 51
End: 2021-05-25
Payer: COMMERCIAL

## 2021-05-25 VITALS
OXYGEN SATURATION: 98 % | RESPIRATION RATE: 16 BRPM | HEIGHT: 64 IN | WEIGHT: 190 LBS | DIASTOLIC BLOOD PRESSURE: 84 MMHG | BODY MASS INDEX: 32.44 KG/M2 | SYSTOLIC BLOOD PRESSURE: 128 MMHG | HEART RATE: 80 BPM

## 2021-05-25 DIAGNOSIS — M79.642 PAIN IN BOTH HANDS: Primary | ICD-10-CM

## 2021-05-25 DIAGNOSIS — M79.641 PAIN IN BOTH HANDS: Primary | ICD-10-CM

## 2021-05-25 DIAGNOSIS — R29.2 HOFFMAN SIGN PRESENT: ICD-10-CM

## 2021-05-25 DIAGNOSIS — R29.898 HAND WEAKNESS: ICD-10-CM

## 2021-05-25 PROCEDURE — 99204 OFFICE O/P NEW MOD 45 MIN: CPT | Performed by: PSYCHIATRY & NEUROLOGY

## 2021-05-25 NOTE — PROGRESS NOTES
[facility] and [address] at [facility] at [address] [facility], [address] [facility] [address] [facility], [address] [place] [address]  [phone] Office  [phone] Facsimile           Referring: Self    Chief Complaint   Patient presents with    New Patient    Hand Pain     pain and cramping    Toe Pain     burning    Dizziness     spinning comes and goes over the last 3wks   50-year-old lady presents for neurologic consultation regarding bilateral hand pain, cramping and weakness. She notes that her symptoms started around the time she had her cancer treatment and have been progressive. She had an EMG at [facility] she believes that demonstrated carpal tunnel syndrome bilaterally. She had carpal tunnel release last year. Still had same symptoms. She notes that she has locking of the joints and pain in the joints. She says she cannot type. She is a  and types throughout the day. She has been out of work because of this. She saw Dr. Lalita Camilo who referred her to [facility] but she was never contacted about an appointment. She is unsure as to what especially to which she was being referred. She saw a rheumatology several years ago but nothing recent. Notes the hands will swell and she has joint pain. She has difficulty holding objects. She drops things and has broken several coffee cups over the last few weeks. Description is not that of a dystonic type posturing. Record review finds patient saw Dr. Cordell Osei 10/28/2016 for complaints of painful numbness and tingling in her both hands distally of 4 weeks duration. She was said to be stage II breast cancer status post right axillary lymph node resection. She also had similar symptoms in her legs were less intense. No weakness. History of rheumatoid arthritis. I chemotherapy over the summer and was to start radiation.   Her examination was unremarkable with intact sensory exam and reflexes. Impression at that time was that of painful paresthesias in her extremities. Laboratory analyses and EMG were ordered. Patient was also going to see rheumatology. I do not see where the EMG was done. Most recent note with Dr. Tyrone Burkett dated 10/26/2020 following up for breast cancer. This was her 2-year follow-up. She was not on any therapy. No hormonal therapy. She was noted to be seeing Dr. Patti Yang and at that time having memory issues. She was reporting severe memory loss. She was complaining of neuropathy in her feet. She was applying for disability. Laboratory analysis from July 2019 ordered by Dr. Marcio Porter with normal B1, normal prealbumin, normal zinc, normal vitamin A, normal copper unremarkable CBC, hemoglobin A1c 6.6, H. pylori IgM antibody normal, normal iron studies. Normal metabolic profile was also done except for glucose at 130, vitamin D normal, parathyroid hormone normal, iron studies unremarkable.   Folate and B12 levels normal.  TSH normal.    Past Medical History:   Diagnosis Date    Arthritis     HANDS AND KNEES    Asthma     Breast cancer ([facility] [address] 03/31/2017    Carcinoma in situ    Cancer ([facility] [address] 2016    RIGHT breast cancer    Depression     GERD (gastroesophageal reflux disease)     Heart murmur     Hypertension     Neuropathy     PUD (peptic ulcer disease)     S/P radiation therapy 2016    and chemo finished all 1/2017    Vertigo        Past Surgical History:   Procedure Laterality Date    HX BREAST BIOPSY Right     SEVERAL TIMES    HX BREAST LUMPECTOMY Right 3/31/2016    RIGHT BREAST LUMPECTOMY, RIGHT SENTINEL NODE BIOPSY WITH ULTRASOUND performed by Louann Coto MD at [address] HX CHOLECYSTECTOMY      HX GI      COLONOSCOPY    HX GYN      cesarian x 3    HX HYSTERECTOMY      Still has ovaries    HX ORTHOPAEDIC Left     ARM FX WITH PINS    HX TONSILLECTOMY      HX UROLOGICAL      CYSTOSCOPY    HX UROLOGICAL      BLADDER SLING    KAREN STEREO  BX BREAST RT 1ST LESION W/CLIP AND SPECIMEN Right 2013    benign    KAREN US BX BREAST RT 1ST LESION W/CLIP AND SPECIMEN Right 01/29/2016    Ca in situ    WA BREAST SURGERY PROCEDURE UNLISTED Right 5/17/16    RIGHT axillary lymph node dissection and PAC insertion       Current Outpatient Medications   Medication Sig Dispense Refill    meclizine (ANTIVERT) 25 mg tablet TK 1 T PO UP TO TID PRN 10 Tab 0    cetirizine (ZYRTEC) 10 mg tablet TK 1 T PO QD  Indications: seasonal runny nose 30 Tab 5    traZODone (DESYREL) 100 mg tablet TK 2 TS PO QHS      Trintellix 20 mg tablet TK 1 T PO QD      diclofenac (VOLTAREN) 1 % gel Apply  to affected area four (4) times daily. 100 g 1    ibuprofen (MOTRIN) 800 mg tablet Take 1 Tab by mouth every six (6) hours as needed for Pain. 30 Tab 0    busPIRone (BUSPAR) 5 mg tablet Take  by mouth three (3) times daily (with meals).  gabapentin (NEURONTIN) 300 mg capsule Take 300 mg by mouth three (3) times daily.  pyridoxine, vitamin B6, (VITAMIN B-6) 25 mg tablet Take 1 Tab by mouth daily. (Patient not taking: Reported on 5/25/2021) 90 Tab 3    cyanocobalamin 1,000 mcg tablet Take 1 Tab by mouth daily. (Patient not taking: Reported on 5/25/2021) 90 Tab 3    escitalopram oxalate (LEXAPRO) 10 mg tablet TK 1 T PO QD (Patient not taking: Reported on 5/25/2021)  0    hydroCHLOROthiazide (MICROZIDE) 12.5 mg capsule TK 1 C PO QD IN THE MORNING (Patient not taking: Reported on 5/25/2021)  1    ferrous sulfate (FEOSOL) 325 mg (65 mg iron) tablet Take  by mouth Daily (before breakfast).  Takes 3 tabs daily (Patient not taking: Reported on 5/25/2021)      venlafaxine-SR [facility] 150 mg capsule  (Patient not taking: Reported on 5/25/2021)      VENTOLIN HFA 90 mcg/actuation inhaler  (Patient not taking: Reported on 5/25/2021)          Allergies   Allergen Reactions    Adhesive Tape-Silicones Rash and Itching    Darvocet A500 [Propoxyphene N-Acetaminophen] Nausea Only and Other (comments)     headache    Diazepam Other (comments)     Not an allergy, just does not work for her at all.  Hydrocodone Nausea and Vomiting and Vertigo    Percocet [Oxycodone-Acetaminophen] Nausea Only and Other (comments)     headache    Tylenol-Codeine #2 Itching, Nausea Only and Other (comments)     HEADACHES         Social History     Tobacco Use    Smoking status: Never Smoker    Smokeless tobacco: Never Used    Tobacco comment:  smokes   Vaping Use    Vaping Use: Never used   Substance Use Topics    Alcohol use: Yes     Alcohol/week: 0.0 standard drinks     Comment: occasionally a glass of wine    Drug use: No       Family History   Problem Relation Age of Onset    Hypertension Mother     Kidney Disease Mother         ON DIALYSIS    Heart Disease Mother     No Known Problems Father     Asthma Son     Asthma Daughter     No Known Problems Son     Anesth Problems Neg Hx        Review of Systems  Pertinent positives and negatives as noted with remainder of comprehensive review negative      Examination  Visit Vitals  /84 (BP 1 Location: Left upper arm, BP Patient Position: Sitting, BP Cuff Size: Adult)   Pulse 80   Resp 16   Ht 5' 4\" (1.626 m)   Wt 86.2 kg (190 lb)   LMP 07/04/2014 (Approximate)   SpO2 98%   BMI 32.61 kg/m²     Neurologically, she is awake, alert, and oriented with normal speech and language. Her cognition is normal.    Intact cranial nerves 2-12. No nystagmus. Disk margins are flat bilaterally. She has normal bulk and tone. She has no abnormal movement. She has no pronation or drift. She generates full strength in the upper and lower extremities to direct confrontational testing. Reflexes are symmetrical in the upper and lower extremities bilaterally. She has bilateral Marck present. Finger nose finger and rapid alternating movements are normal.  Steady gait. Impression/Plan  58-year-old lady with progressive complaints of weakness pain and swelling and locking joints in her hands with a neurologic evaluation demonstrating carpal tunnel in the past and she status post carpal tunnel surgery. Discussed that the unremarkable EMG would take out of play any peripheral type process. In any regard she has bilateral Marck so it potentially could be a cervical issue i.e. myelopathy although she does not look overtly myelopathic. MRI of the cervical spine to evaluate for myelopathy  Rheumatology evaluation  If the MRI of the cervical spine is unremarkable then no further neurologic evaluation needed    Jorge Henriquez MD          This note was created using voice recognition software. Despite editing, there may be syntax errors.

## 2021-05-25 NOTE — PATIENT INSTRUCTIONS
RESULT POLICY      If we have ordered testing for you, know that; \"NO NEWS IS GOOD NEWS! \"     It is our policy that we no longer call patients with results, nor do we  give test results over the phone. We schedule follow up appointments so that your results can be discussed in person. This allows you to address any questions you have regarding the results. If you choose to go to an imaging center outside of [facility], it is your responsibility to bring imaging report and disc to follow up appointment. If something of concern is revealed on your test, we will contact you to discuss the matter and if needed schedule a sooner follow up appointment. Additionally, results may be found by using the My Chart feature and one of our patient service representatives at the  can give you instructions on how to access this feature to utilize our electronic medical record system. Thank you for your understanding. [facility]   Statement to Patients  April 1, 2014      In an effort to ensure the large volume of patient prescription refills is processed in the most efficient and expeditious manner, we are asking our patients to assist us by calling your Pharmacy for all prescription refills, this will include also your  Mail Order Pharmacy. The pharmacy will contact our office electronically to continue the refill process. Please do not wait until the last minute to call your pharmacy. We need at least 48 hours (2days) to fill prescriptions. We also encourage you to call your pharmacy before going to  your prescription to make sure it is ready. With regard to controlled substance prescription refill requests (narcotic refills) that need to be picked up at our office, we ask your cooperation by providing us with at least 72 hours (3days) notice that you will need a refill.     We will not refill narcotic prescription refill requests after 4:00pm on any weekday, Monday through Thursday, or after 2:00pm on Fridays, or on the weekends. We encourage everyone to explore another way of getting your prescription refill request processed using [employer], our patient web portal through our electronic medical record system. [employer] is an efficient and effective way to communicate your medication request directly to the office and  downloadable as an sussy on your smart phone . [employer] also features a review functionality that allows you to view your medication list as well as leave messages for your physician. Are you ready to get connected? If so please review the attatched instructions or speak to any of our staff to get you set up right away! Thank you so much for your cooperation. Should you have any questions please contact our Practice Administrator.

## 2021-05-26 ENCOUNTER — TELEPHONE (OUTPATIENT)
Dept: NEUROLOGY | Age: 51
End: 2021-05-26

## 2021-05-26 NOTE — TELEPHONE ENCOUNTER
----- Message from Radha Live sent at 5/25/2021  3:24 PM EDT -----  Regarding: /telephone  General Message/Vendor Calls    Caller's first and last name:      Reason for call: The pt stated she was referred to Dr. Hamida Ball at the [facility], but that they are requiring a referral first.       Callback required yes/no and why:Yes.       Best contact number(s):[phone]

## 2021-06-10 ENCOUNTER — HOSPITAL ENCOUNTER (OUTPATIENT)
Dept: MRI IMAGING | Age: 51
Discharge: HOME | End: 2021-06-10
Attending: PSYCHIATRY & NEUROLOGY

## 2021-06-10 DIAGNOSIS — M79.641 PAIN IN BOTH HANDS: ICD-10-CM

## 2021-06-10 DIAGNOSIS — R29.898 HAND WEAKNESS: ICD-10-CM

## 2021-06-10 DIAGNOSIS — M79.642 PAIN IN BOTH HANDS: ICD-10-CM

## 2021-06-10 DIAGNOSIS — R29.2 HOFFMAN SIGN PRESENT: ICD-10-CM

## 2021-06-16 ENCOUNTER — TELEPHONE (OUTPATIENT)
Dept: NEUROLOGY | Age: 51
End: 2021-06-16

## 2021-06-16 DIAGNOSIS — F40.240 CLAUSTROPHOBIA: Primary | ICD-10-CM

## 2021-06-16 NOTE — LETTER
NOTIFICATION RETURN TO WORK 
 
6/19/2021 11:40 AM 
 
Ms. Luz Aguilar [address] [address] [address] To Whom It May Concern: 
 
Luz Aguilar is currently under the care of [facility]. She has been out of work since 4/22/21. Expected return to work will be around 12/15/21. If there are questions or concerns please have the patient contact our office. Sincerely, Mecca Cano MD

## 2021-06-16 NOTE — TELEPHONE ENCOUNTER
----- Message from Dick Sterling sent at 6/16/2021 11:11 AM EDT -----  Regarding: TOSHIA Lutz/ Telephone    Caller's first and last name: N/A  Reason for call: Update   Best contact number(s): [phone]  Details to clarify the request: Pt stated that she needs a call back in regards to MRI and paper work . Pt stated that she is claustrophobic and needs to speak with the nurse as soon as possible.

## 2021-06-20 RX ORDER — DIAZEPAM 10 MG/1
TABLET ORAL
Qty: 1 TABLET | Refills: 0 | Status: SHIPPED | OUTPATIENT
Start: 2021-06-20 | End: 2021-10-21

## 2021-06-24 ENCOUNTER — TELEPHONE (OUTPATIENT)
Dept: NEUROLOGY | Age: 51
End: 2021-06-24

## 2021-06-24 NOTE — TELEPHONE ENCOUNTER
Called patient to reschedule appointment for today however patient states that she was supposed to have some important papers filled out today at appointment. Patient requested a return call from Elizabeth today.

## 2021-06-25 NOTE — TELEPHONE ENCOUNTER
----- Message from Maximiliano Gross sent at 6/25/2021  2:48 PM EDT -----  Regarding: Dr. Solis Getting Message/Vendor Calls    Caller's first and last name: Patient       Reason for call: pt would like a return call concerning the Disability paperwork, she has called several times with no response      Callback required yes/no and why: Yes      Best contact number(s): [phone]      Details to clarify the request:      Maximiliano Gross

## 2021-06-25 NOTE — TELEPHONE ENCOUNTER
----- Message from Ortega Ellis sent at 6/25/2021  9:28 AM EDT -----  Regarding: [employer]/Telephone  General Message/Vendor Calls    Caller's first and last name: Patient      Reason for call: A new disability form need to be completed correctly , patient would like to speak with nurse so that form can be completed       Callback required yes/no and why: yes      Best contact number(s): [phone]      Details to clarify the request:      Ortega Ellis

## 2021-06-28 NOTE — TELEPHONE ENCOUNTER
----- Message from Betty Mcfarland sent at 6/28/2021 10:26 AM EDT -----  Regarding: TOSHIA Lutz/ Telephone    Caller's first and last name: N/A  Reason for call: Disability form   Best contact number(s): [phone]  Details to clarify the request: Pt stated that she has called several times in regards to her disability forms . Pt stated that she has not received a call back yet and she is very upset and is heading to the practice today 06/28/2021. Pt needs a call back as soon as possible.

## 2021-06-30 ENCOUNTER — TELEPHONE (OUTPATIENT)
Dept: NEUROLOGY | Age: 51
End: 2021-06-30

## 2021-06-30 ENCOUNTER — HOSPITAL ENCOUNTER (OUTPATIENT)
Dept: MRI IMAGING | Age: 51
Discharge: HOME | End: 2021-06-30
Attending: PSYCHIATRY & NEUROLOGY
Payer: COMMERCIAL

## 2021-06-30 PROCEDURE — 72141 MRI NECK SPINE W/O DYE: CPT

## 2021-06-30 NOTE — TELEPHONE ENCOUNTER
----- Message from Sand Sign Senters sent at 6/30/2021 10:41 AM EDT -----  Regarding: TOSHIA Ríos  General Message/Vendor Calls    Caller's first and last name: [employer]      Reason for call: clarify appt dates      Callback required yes/no and why: Yes      Best contact number(s): [phone]      Details to clarify the request: [employer] calling to clarify the dates of pt's appointments. Please advise.        Sand Sign Senters

## 2021-06-30 NOTE — TELEPHONE ENCOUNTER
Wanted to verify if patient was seen in March 2020. I stated that the patient was first seen in this office on 5/25/2021.

## 2021-07-02 ENCOUNTER — TELEPHONE (OUTPATIENT)
Dept: NEUROLOGY | Age: 51
End: 2021-07-02

## 2021-07-02 NOTE — TELEPHONE ENCOUNTER
Patient called and wants to make sure Samra received paperwork from [address] [place] [address] that was faxed on Tuesday 6/30/21 attention to her. She's like a call back to know if it was received or not.

## 2021-07-02 NOTE — TELEPHONE ENCOUNTER
----- Message from Namrata Trent sent at 7/2/2021  8:37 AM EDT -----  Regarding: Dr. Dalila Ye first and last name: [place] eliza Khushboo    Reason for call: Letter request    Callback required yes/no and why: Yes, to verify that letter request was received    Best contact number(s): [phone]    Details to clarify the request: Saravanan Castellon is wanting to verify if letter requesting appt dates and treatment notes has been received.

## 2021-07-19 ENCOUNTER — OFFICE VISIT (OUTPATIENT)
Dept: NEUROLOGY | Age: 51
End: 2021-07-19
Payer: COMMERCIAL

## 2021-07-19 VITALS
HEIGHT: 63 IN | RESPIRATION RATE: 14 BRPM | WEIGHT: 188 LBS | DIASTOLIC BLOOD PRESSURE: 78 MMHG | HEART RATE: 86 BPM | BODY MASS INDEX: 33.31 KG/M2 | OXYGEN SATURATION: 98 % | SYSTOLIC BLOOD PRESSURE: 124 MMHG

## 2021-07-19 DIAGNOSIS — M79.641 PAIN IN BOTH HANDS: Primary | ICD-10-CM

## 2021-07-19 DIAGNOSIS — M79.642 PAIN IN BOTH HANDS: Primary | ICD-10-CM

## 2021-07-19 DIAGNOSIS — R29.898 HAND WEAKNESS: ICD-10-CM

## 2021-07-19 DIAGNOSIS — R20.0 SENSORY LOSS: ICD-10-CM

## 2021-07-19 DIAGNOSIS — Z85.3 HX: BREAST CANCER: ICD-10-CM

## 2021-07-19 DIAGNOSIS — R79.89 LOW VITAMIN D LEVEL: ICD-10-CM

## 2021-07-19 PROCEDURE — 99214 OFFICE O/P EST MOD 30 MIN: CPT | Performed by: NURSE PRACTITIONER

## 2021-07-19 NOTE — PROGRESS NOTES
[address] [facility],5Th [address]  [address]   [place] [address] [place] [address] [address]   [phone] Office   [phone] Fax           Date:  21     Name:  Romi Knowles  :  1970  MRN:  [id number]     PCP:  Adalberto Laurent MD    Chief Complaint   Patient presents with    Results     MRi     Tingling     tingling sharp sporadic pain that shoots up both hands and feet( feet-mainly when driving)     Procedure     HISTORY OF PRESENT ILLNESS: Following for complaints of weakness and pain in her hands. To rule out any possible myelopathic process or radicular issue, she was sent for MRI of the cervical spine. There was no obvious areas of cord signal abnormality. She does have mild to moderate right neural foraminal stenosis at C5-C6 but otherwise has mild degenerative changes throughout the cervical spine. There is no evidence of any central canal stenosis at any level. Recap from her initial office visit with Dr. Alcira Sumner:  69-year-old lady with progressive complaints of weakness pain and swelling and locking joints in her hands with a neurologic evaluation demonstrating carpal tunnel in the past and she status post carpal tunnel surgery. Discussed that the unremarkable EMG would take out of play any peripheral type process. In any regard she has bilateral Marck so it potentially could be a cervical issue i.e. myelopathy although she does not look overtly myelopathic.   MRI of the cervical spine to evaluate for myelopathy  Rheumatology evaluation  If the MRI of the cervical spine is unremarkable then no further neurologic evaluation needed     Current Outpatient Medications   Medication Sig    meclizine (ANTIVERT) 25 mg tablet TK 1 T PO UP TO TID PRN    cetirizine (ZYRTEC) 10 mg tablet TK 1 T PO QD  Indications: seasonal runny nose    traZODone (DESYREL) 100 mg tablet TK 2 TS PO QHS    diclofenac (VOLTAREN) 1 % gel Apply  to affected area four (4) times daily.  cyanocobalamin 1,000 mcg tablet Take 1 Tab by mouth daily.  busPIRone (BUSPAR) 5 mg tablet Take  by mouth three (3) times daily (with meals). Indications: taking 15 mg BID    gabapentin (NEURONTIN) 300 mg capsule Take 300 mg by mouth three (3) times daily.  diazePAM (VALIUM) 10 mg tablet Take one tablet po 45 min prior to procedure for one dose. Must have transportation. (Patient not taking: Reported on 7/19/2021)    Trintellix 20 mg tablet TK 1 T PO QD    pyridoxine, vitamin B6, (VITAMIN B-6) 25 mg tablet Take 1 Tab by mouth daily. (Patient not taking: Reported on 5/25/2021)    ibuprofen (MOTRIN) 800 mg tablet Take 1 Tab by mouth every six (6) hours as needed for Pain. (Patient not taking: Reported on 7/19/2021)    escitalopram oxalate (LEXAPRO) 10 mg tablet TK 1 T PO QD (Patient not taking: Reported on 5/25/2021)    hydroCHLOROthiazide (MICROZIDE) 12.5 mg capsule TK 1 C PO QD IN THE MORNING (Patient not taking: Reported on 5/25/2021)    ferrous sulfate (FEOSOL) 325 mg (65 mg iron) tablet Take  by mouth Daily (before breakfast). Takes 3 tabs daily (Patient not taking: Reported on 5/25/2021)    venlafaxine-SR [facility] 150 mg capsule  (Patient not taking: Reported on 5/25/2021)    VENTOLIN HFA 90 mcg/actuation inhaler  (Patient not taking: Reported on 5/25/2021)     No current facility-administered medications for this visit. Allergies   Allergen Reactions    Adhesive Tape-Silicones Rash and Itching    Darvocet A500 [Propoxyphene N-Acetaminophen] Nausea Only and Other (comments)     headache    Diazepam Other (comments)     Not an allergy, just does not work for her at all.     Hydrocodone Nausea and Vomiting and Vertigo    Percocet [Oxycodone-Acetaminophen] Nausea Only and Other (comments)     headache    Tylenol-Codeine #2 Itching, Nausea Only and Other (comments)     HEADACHES       Past Medical History:   Diagnosis Date    Arthritis HANDS AND KNEES    Asthma     Breast cancer ([facility] [address] 03/31/2017    Carcinoma in situ    Cancer ([facility] [address] 2016    RIGHT breast cancer    Depression     GERD (gastroesophageal reflux disease)     Heart murmur     Hypertension     Neuropathy     PUD (peptic ulcer disease)     S/P radiation therapy 2016    and chemo finished all 1/2017    Vertigo      Past Surgical History:   Procedure Laterality Date    HX BREAST BIOPSY Right     SEVERAL TIMES    HX BREAST LUMPECTOMY Right 3/31/2016    RIGHT BREAST LUMPECTOMY, RIGHT SENTINEL NODE BIOPSY WITH ULTRASOUND performed by Anderson Tavarez MD at [address] HX CHOLECYSTECTOMY      HX GI      COLONOSCOPY    HX GYN      cesarian x 3    HX HYSTERECTOMY      Still has ovaries    HX ORTHOPAEDIC Left     ARM FX WITH PINS    HX TONSILLECTOMY      HX UROLOGICAL      CYSTOSCOPY    HX UROLOGICAL      BLADDER SLING    KAREN STEREO  BX BREAST RT 1ST LESION W/CLIP AND SPECIMEN Right 2013    benign    KAREN US BX BREAST RT 1ST LESION W/CLIP AND SPECIMEN Right 01/29/2016    Ca in situ    NV BREAST SURGERY PROCEDURE UNLISTED Right 5/17/16    RIGHT axillary lymph node dissection and PAC insertion     Social History     Socioeconomic History    Marital status: LEGALLY      Spouse name: Not on file    Number of children: Not on file    Years of education: Not on file    Highest education level: Not on file   Occupational History    Not on file   Tobacco Use    Smoking status: Never Smoker    Smokeless tobacco: Never Used    Tobacco comment:  smokes   Vaping Use    Vaping Use: Never used   Substance and Sexual Activity    Alcohol use: Yes     Alcohol/week: 0.0 standard drinks     Comment: occasionally a glass of wine    Drug use: No    Sexual activity: Yes     Partners: Male     Birth control/protection: None   Other Topics Concern    Not on file   Social History Narrative    Recently moved from [address] [place] [address], they have been here since April. . Social Determinants of Health     Financial Resource Strain:     Difficulty of Paying Living Expenses:    Food Insecurity:     Worried About Running Out of Food in the Last Year:     [address] in the Last Year:    Transportation Needs:     Lack of Transportation (Medical):  Lack of Transportation (Non-Medical):    Physical Activity:     Days of Exercise per Week:     Minutes of Exercise per Session:    Stress:     Feeling of Stress :    Social Connections:     Frequency of Communication with Friends and Family:     Frequency of Social Gatherings with Friends and Family:     Attends [employer] Services:     Active Member of Clubs or Organizations:     Attends Club or Organization Meetings:     Marital Status:    Intimate Partner Violence:     Fear of Current or Ex-Partner:     Emotionally Abused:     Physically Abused:     Sexually Abused:      Family History   Problem Relation Age of Onset    Hypertension Mother     Kidney Disease Mother         ON DIALYSIS    Heart Disease Mother     No Known Problems Father     Asthma Son     Asthma Daughter     No Known Problems Son     Anesth Problems Neg Hx        PHYSICAL EXAMINATION:    Visit Vitals  /78   Pulse 86   Resp 14   Ht 5' 3\" (1.6 m)   Wt 85.3 kg (188 lb)   SpO2 98%   BMI 33.30 kg/m²     General:  Well defined, nourished, and groomed individual in no acute distress. Neck: Supple, nontender, no bruits, no pain with resistance to active range of motion. Heart: Regular rate and rhythm, no murmurs, rub, or gallop. Normal S1S2. Lungs:  Clear to auscultation bilaterally with equal chest expansion, no cough, no wheeze  Musculoskeletal:  Extremities revealed no edema and had full range of motion of joints. Psych:  Good mood and bright affect    NEUROLOGICAL EXAMINATION:     Mental Status:   Alert and oriented to person, place, and time with recent and remote memory intact.   Attention span and concentration are normal. Speech is fluent with a full fund of knowledge. Cranial Nerves:  I: smell Not tested   II: visual fields Full to confrontation   II: pupils Equal, round, reactive to light   II: optic disc No papilledema   III,VII: ptosis none   III,IV,VI: extraocular muscles  Full ROM   V: mastication normal   V: facial light touch sensation  normal   VII: facial muscle function   symmetric   VIII: hearing symmetric   IX: soft palate elevation  normal   XI: trapezius strength  5/5   XI: sternocleidomastoid strength 5/5   XI: neck flexion strength  5/5   XII: tongue  midline     Motor Examination: Normal tone, bulk, and strength, 5/5 muscle strength throughout except in the hands bilaterally, right greater than left. Sensory exam:  Decreased sensation to temperature and vibration in the lower extremities bilaterally in a stocking glove distribution to the ankles. Coordination:  Finger to nose testing was normal.   No resting or intention tremor    Gait and Station:  Steady while walking. Normal arm swing. No pronator drift. No muscle wasting or fasiculations noted. Reflexes:  DTRs 2+ throughout. ASSESSMENT AND PLAN    ICD-10-CM ICD-9-CM    1. Pain in both hands  M79.641 729.5     M79.642     2. Hand weakness  R29.898 728.87    3. Low vitamin D level  R79.89 790.6 VITAMIN D, 25 HYDROXY      VITAMIN D, 25 HYDROXY   4. HX: breast cancer  Z85.3 V10.3 SPEP AND EDDIE, SERUM      TSH 3RD GENERATION      T4, FREE      SPEP AND EDDIE, SERUM      TSH 3RD GENERATION      T4, FREE   5. Sensory loss  R20.0 782.0 VITAMIN B12 & FOLATE      SPEP AND EDDIE, SERUM      TSH 3RD GENERATION      T4, FREE      VITAMIN B12 & FOLATE      SPEP AND EDDIE, SERUM      TSH 3RD GENERATION      T4, FREE     Ongoing complaints of disturbance of sensation in both hands primarily that is associated with some pain as well. On exam, she does have a diminished bilateral hand , right greater than left.   There is also noted a decrease in sensation in a stocking glove distribution of both lower extremities. For further evaluation, she will be set up for EMG. She was also sent for metabolic studies to include TFT, B12, and vitamin D given some of her complaints of cramping. She was also sent for SPEP given her previous history of breast cancer and potential for neoplastic progression. Follow-up after completion of her testing. Tita Gann

## 2021-07-19 NOTE — PROGRESS NOTES
Chief Complaint   Patient presents with    Results     MRi     Tingling     tingling sharp sporadic pain that shoots up both hands and feet( feet-mainly when driving)     Procedure     Visit Vitals  /78   Pulse 86   Resp 14   Ht 5' 3\" (1.6 m)   Wt 85.3 kg (188 lb)   SpO2 98%   BMI 33.30 kg/m²

## 2021-09-02 ENCOUNTER — OFFICE VISIT (OUTPATIENT)
Dept: NEUROLOGY | Age: 51
End: 2021-09-02
Payer: COMMERCIAL

## 2021-09-02 DIAGNOSIS — R20.2 PARESTHESIA: Primary | ICD-10-CM

## 2021-09-02 PROCEDURE — 95885 MUSC TST DONE W/NERV TST LIM: CPT | Performed by: PSYCHIATRY & NEUROLOGY

## 2021-09-02 PROCEDURE — 95912 NRV CNDJ TEST 11-12 STUDIES: CPT | Performed by: PSYCHIATRY & NEUROLOGY

## 2021-09-02 NOTE — LETTER
2021 12:55 PM    Patient:  Tali Ibarra   YOB: 1970  Date of Visit: 2021      Dear Dhaval Pickering MD  [address] [address]  Via Fax: [phone]: Thank you for referring Ms. Osiris Gannon to me for EMG/NCS. EMG/ NCS Report  [facility]  - [facility]  [address] [place] [address] [address], [address]   Ph: [phone]   FAX: [phone]/ [phone]  Test Date:  2019      Test Date:  2021    Patient: Anum Howard : 1970 Physician: Ashlyn Montes MD   Sex: Female Height: ' \" Ref PhysFlavio Smith   ID#: [id number] Weight:  lbs. Technician: Bernice Royal     Patient History / Exam:    Patient is with stage II breast cancer status post right axillary lymph node resection who since 2016 noted  painful numbness and tingling in all extremities  Occasional radiation proximally. Sometimes symmetrical unilateral.  Similar symptoms in both legs but less intense. In the past 2 weeks she has noticed more swelling of the hands with difficulty flexing her thumb bilaterally. Having joint pain bilaterally distally. No acute weakness. She has a history of rheumatoid arthritis. Sed rate is elevated. Completed chemotherapy in 2016. (+) bilateral CTS surgery. Patient is coming for neuropathy evaluation. EMG & NCV Findings:  Evaluation of the left Fibular motor nerve showed normal distal onset latency (3.9 ms), normal amplitude (4.8 mV), normal conduction velocity (B Fib-Ankle, 48 m/s), and normal conduction velocity (Poplt-B Fib, 42 m/s). The left median motor nerve showed normal distal onset latency (4.1 ms), normal amplitude (14.5 mV), and normal conduction velocity (Elbow-Wrist, 53 m/s). The left tibial motor nerve showed normal distal onset latency (4.5 ms), normal amplitude (9.7 mV), and normal conduction velocity (Knee-Ankle, 50 m/s).   The left ulnar motor nerve showed normal distal onset latency (2.6 ms), normal amplitude (7.1 mV), normal conduction velocity (B Elbow-Wrist, 67 m/s), and normal conduction velocity (A Elbow-B Elbow, 59 m/s). The left median sensory, the left radial sensory, the left sural sensory, and the left ulnar sensory nerves showed normal distal peak latency (L3.7, L2.1, L3.6, L3.3 ms) and normal amplitude (L36.4, L44.0, L6.1, L36.0 µV). The left Sup Fibular sensory nerve showed normal distal peak latency (2.5 ms), normal amplitude (29.1 µV), and normal conduction velocity (Lower leg-Lat ankle, 53 m/s). The left median/ulnar (palm) comparison nerve showed normal distal onset latency (Median Palm, 1.9 ms), prolonged distal peak latency (Median Palm, 2.5 ms), normal amplitude (Median Palm, 11.3 µV), normal distal onset latency (Ulnar Palm, 1.4 ms), normal distal peak latency (Ulnar Palm, 1.9 ms), and normal amplitude (Ulnar Palm, 11.2 µV). All F Wave latencies were within normal limits. All examined muscles (as indicated in the following table) showed no evidence of electrical instability. Impression:  ABNORMAL    Nerve conduction study of the left upper and left lower extremities, including palmar study, shows slowing of the peak latency of the median palmar compared to the ulnar palmar response which may represent residual findings related to prior left carpal tunnel syndrome s/p surgery. Otherwise, no evidence of a generalized sensorimotor polyneuropathy of the large fiber type is noted. Needle examination of the left upper extremity was prematurely stopped due to patient's low tolerance to pain. Needle examination of the first dorsal interosseous, Extensor indicis and Abductor pollicis brevis muscles are within normal limits.           Katrina Kang MD  Diplomate, American Board of Psychiatry and Neurology  Diplomate, Neuromuscular Medicine  Diplomate, American Board of Electrodiagnostic Medicine  Director, [facility] Accredited Laboratory with Exemplary Status              Nerve Conduction Studies  Anti Sensory Summary Table     Stim Site NR Peak (ms) Norm Peak (ms) P-T Amp (µV) Norm P-T Amp Site1 Site2 Dist (cm)   Left Median Anti Sensory (2nd Digit)  31°C   Wrist    3.7 <4 36.4 >13 Wrist 2nd Digit 14.0   Left Radial Anti Sensory (Base 1st Digit)  31.4°C   Wrist    2.1 <2.8 44.0 >11 Wrist Base 1st Digit 10.0   Left Sup Fibular Anti Sensory (Lat ankle)  28.6°C   Lower leg    2.5 <4.5 29.1 >5 Lower leg Lat ankle 10.0   Left Sural Anti Sensory (Lat Mall)  28.8°C   Calf    3.6 <4.5 6.1 >4.0 Calf Lat Mall 14.0   Left Ulnar Anti Sensory (5th Digit)  31.3°C   Wrist    3.3 <4.0 36.0 >9 Wrist 5th Digit 14.0     Motor Summary Table     Stim Site NR Onset (ms) Norm Onset (ms) O-P Amp (mV) Norm O-P Amp Amp (Prev) (%) Site1 Site2 Dist (cm) Taiwo (m/s) Norm Taiwo (m/s)   Left Fibular Motor (Ext Dig Brev)  28.6°C   Ankle    3.9 <6.5 4.8 >2.6 100.0 Ankle Ext Dig Brev 8.0     B Fib    10.4  3.0  62.5 B Fib Ankle 31.0 48 >38   Poplt    12.8  4.1  136.7 Poplt B Fib 10.0 42 >42   Left Median Motor (Abd Poll Brev)  31.5°C   Wrist    4.1 <4.5 14.5 >4.1 100.0 Wrist Abd Poll Brev 8.0     Elbow    7.7  13.4  92.4 Elbow Wrist 19.0 53 >49   Left Tibial Motor (Abd Ross Brev)  29.6°C   Ankle    4.5 <6.1 9.7 >5.3 100.0 Ankle Abd Ross Brev 8.0     Knee    11.3  6.3  64.9 Knee Ankle 34.0 50 >39   Left Ulnar Motor (Abd Dig Minimi)  24.2°C   Wrist    2.6 <3.1 7.1 >7.0 100.0 Wrist Abd Dig Minimi 8.0  >50   B Elbow    5.6  6.1  85.9 B Elbow Wrist 20.0 67 >50   A Elbow    7.3  6.4  104.9 A Elbow B Elbow 10.0 59 >50     Comparison Summary Table     Stim Site NR Peak (ms) P-T Amp (µV) Site1 Site2 Dist (cm) Delta-0 (ms)   Left Median/Ulnar Palm Comparison (Wrist)  31.3°C   Median Palm    2.5 13.7 Median Palm Ulnar Palm 8.0 0.5   Ulnar Palm    1.9 11.5         F Wave Studies     NR F-Lat (ms) Lat Norm (ms) L-R F-Lat (ms) L-R Lat Norm   Left Tibial (Mrkrs) (Abd Hallucis)  29.1°C      45.51 <56 <5.7   Left Ulnar (Mrkrs) (Abd Dig Min)  31.3°C      24.87 <32  <2.5     H Reflex Studies     NR H-Lat (ms) L-R H-Lat (ms) L-R Lat Norm   Left Tibial (Gastroc)  29°C      30.15  <2.0     EMG     Side Muscle Nerve Root Ins Act Fibs Psw Recrt Duration Amp Poly Comment   Left 1stDorInt Ulnar C8-T1 Nml Nml Nml Nml Nml Nml Nml    Left ExtIndicis Radial (Post Int) C7-8 Nml Nml Nml Nml Nml Nml Nml    Left Abd Poll Brev Median C8-T1 Nml Nml Nml Nml Nml Nml Nml                Nerve Conduction Studies  Anti Sensory Left/Right Comparison     Stim Site L Lat (ms) R Lat (ms) L-R Lat (ms) L Amp (µV) R Amp (µV) L-R Amp (%) Site1 Site2 L Taiwo (m/s) R Taiwo (m/s) L-R Taiwo (m/s)   Median Anti Sensory (2nd Digit)  31°C   Wrist 2.8   36.4   Wrist 2nd Digit 50     Radial Anti Sensory (Base 1st Digit)  31.4°C   Wrist 1.5   44.0   Wrist Base 1st Digit 67     Sup Fibular Anti Sensory (Lat ankle)  28.6°C   Lower leg 1.9   29.1   Lower leg Lat ankle 53     Sural Anti Sensory (Lat Mall)  28.8°C   Calf 2.8   6.1   Calf Lat Mall 50     Ulnar Anti Sensory (5th Digit)  31.3°C   Wrist 2.3   36.0   Wrist 5th Digit 61       Motor Left/Right Comparison     Stim Site L Lat (ms) R Lat (ms) L-R Lat (ms) L Amp (mV) R Amp (mV) L-R Amp (%) Site1 Site2 L Taiwo (m/s) R Taiwo (m/s) L-R Taiwo (m/s)   Fibular Motor (Ext Dig Brev)  28.6°C   Ankle 3.9   4.8   Ankle Ext Dig Brev      B Fib 10.4   3.0   B Fib Ankle 48     Poplt 12.8   4.1   Poplt B Fib 42     Median Motor (Abd Poll Brev)  31.5°C   Wrist 4.1   14.5   Wrist Abd Poll Brev      Elbow 7.7   13.4   Elbow Wrist 53     Tibial Motor (Abd Ross Brev)  29.6°C   Ankle 4.5   9.7   Ankle Abd Ross Brev      Knee 11.3   6.3   Knee Ankle 50     Ulnar Motor (Abd Dig Minimi)  24.2°C   Wrist 2.6   7.1   Wrist Abd Dig Minimi      B Elbow 5.6   6.1   B Elbow Wrist 67     A Elbow 7.3   6.4   A Elbow B Elbow 59       Comparison Left/Right Comparison     Stim Site L Lat (ms) R Lat (ms) L-R Lat (ms) L Amp (µV) R Amp (µV) L-R Amp (%) Median/Ulnar Palm Comparison (Wrist)  31.3°C   Median Palm 1.9   11.3     Ulnar Palm 1.4   11.2           Waveforms:

## 2021-09-02 NOTE — PROCEDURES
EMG/ NCS Report  [facility] - INPATIENT  [address] [place], [address] [place], [address]   Ph: [phone]   FAX: [phone]/ [phone]  Test Date:  2019      Test Date:  2021    Patient: Jessie Hogue : 1970 Physician: Serjio Murrell MD   Sex: Female Height: ' \" Ref Trista Cope   ID#: [id number] Weight:  lbs. Technician: Demario Camarillo     Patient History / Exam:    Patient is with stage II breast cancer status post right axillary lymph node resection who since 2016 noted  painful numbness and tingling in all extremities  Occasional radiation proximally. Sometimes symmetrical unilateral.  Similar symptoms in both legs but less intense. In the past 2 weeks she has noticed more swelling of the hands with difficulty flexing her thumb bilaterally. Having joint pain bilaterally distally. No acute weakness. She has a history of rheumatoid arthritis. Sed rate is elevated. Completed chemotherapy in 2016. (+) bilateral CTS surgery. Patient is coming for neuropathy evaluation. EMG & NCV Findings:  Evaluation of the left Fibular motor nerve showed normal distal onset latency (3.9 ms), normal amplitude (4.8 mV), normal conduction velocity (B Fib-Ankle, 48 m/s), and normal conduction velocity (Poplt-B Fib, 42 m/s). The left median motor nerve showed normal distal onset latency (4.1 ms), normal amplitude (14.5 mV), and normal conduction velocity (Elbow-Wrist, 53 m/s). The left tibial motor nerve showed normal distal onset latency (4.5 ms), normal amplitude (9.7 mV), and normal conduction velocity (Knee-Ankle, 50 m/s). The left ulnar motor nerve showed normal distal onset latency (2.6 ms), normal amplitude (7.1 mV), normal conduction velocity (B Elbow-Wrist, 67 m/s), and normal conduction velocity (A Elbow-B Elbow, 59 m/s).   The left median sensory, the left radial sensory, the left sural sensory, and the left ulnar sensory nerves showed normal distal peak latency (L3.7, L2.1, L3.6, L3.3 ms) and normal amplitude (L36.4, L44.0, L6.1, L36.0 µV). The left Sup Fibular sensory nerve showed normal distal peak latency (2.5 ms), normal amplitude (29.1 µV), and normal conduction velocity (Lower leg-Lat ankle, 53 m/s). The left median/ulnar (palm) comparison nerve showed normal distal onset latency (Median Palm, 1.9 ms), prolonged distal peak latency (Median Palm, 2.5 ms), normal amplitude (Median Palm, 11.3 µV), normal distal onset latency (Ulnar Palm, 1.4 ms), normal distal peak latency (Ulnar Palm, 1.9 ms), and normal amplitude (Ulnar Palm, 11.2 µV). All F Wave latencies were within normal limits. All examined muscles (as indicated in the following table) showed no evidence of electrical instability. Impression:  ABNORMAL    Nerve conduction study of the left upper and left lower extremities, including palmar study, shows slowing of the peak latency of the median palmar compared to the ulnar palmar response which may represent residual findings related to prior left carpal tunnel syndrome s/p surgery. Otherwise, no evidence of a generalized sensorimotor polyneuropathy of the large fiber type is noted. Needle examination of the left upper extremity was prematurely stopped due to patient's low tolerance to pain. Needle examination of the first dorsal interosseous, Extensor indicis and Abductor pollicis brevis muscles are within normal limits.           Seun Chavarria MD  Diplomate, American Board of Psychiatry and Neurology  Diplomate, Neuromuscular Medicine  Diplomate, American Board of Electrodiagnostic Medicine  Director, [address] [place] [address] Accredited Laboratory with Exemplary Status              Nerve Conduction Studies  Anti Sensory Summary Table     Stim Site NR Peak (ms) Norm Peak (ms) P-T Amp (µV) Norm P-T Amp Site1 Site2 Dist (cm)   Left Median Anti Sensory (2nd Digit)  31°C   Wrist    3.7 <4 36.4 >13 Wrist 2nd Digit 14.0   Left Radial Anti Sensory (Base 1st Digit)  31.4°C   Wrist    2.1 <2.8 44.0 >11 Wrist Base 1st Digit 10.0   Left Sup Fibular Anti Sensory (Lat ankle)  28.6°C   Lower leg    2.5 <4.5 29.1 >5 Lower leg Lat ankle 10.0   Left Sural Anti Sensory (Lat Mall)  28.8°C   Calf    3.6 <4.5 6.1 >4.0 Calf Lat Mall 14.0   Left Ulnar Anti Sensory (5th Digit)  31.3°C   Wrist    3.3 <4.0 36.0 >9 Wrist 5th Digit 14.0     Motor Summary Table     Stim Site NR Onset (ms) Norm Onset (ms) O-P Amp (mV) Norm O-P Amp Amp (Prev) (%) Site1 Site2 Dist (cm) Taiwo (m/s) Norm Taiwo (m/s)   Left Fibular Motor (Ext Dig Brev)  28.6°C   Ankle    3.9 <6.5 4.8 >2.6 100.0 Ankle Ext Dig Brev 8.0     B Fib    10.4  3.0  62.5 B Fib Ankle 31.0 48 >38   Poplt    12.8  4.1  136.7 Poplt B Fib 10.0 42 >42   Left Median Motor (Abd Poll Brev)  31.5°C   Wrist    4.1 <4.5 14.5 >4.1 100.0 Wrist Abd Poll Brev 8.0     Elbow    7.7  13.4  92.4 Elbow Wrist 19.0 53 >49   Left Tibial Motor (Abd Ross Brev)  29.6°C   Ankle    4.5 <6.1 9.7 >5.3 100.0 Ankle Abd Ross Brev 8.0     Knee    11.3  6.3  64.9 Knee Ankle 34.0 50 >39   Left Ulnar Motor (Abd Dig Minimi)  24.2°C   Wrist    2.6 <3.1 7.1 >7.0 100.0 Wrist Abd Dig Minimi 8.0  >50   B Elbow    5.6  6.1  85.9 B Elbow Wrist 20.0 67 >50   A Elbow    7.3  6.4  104.9 A Elbow B Elbow 10.0 59 >50     Comparison Summary Table     Stim Site NR Peak (ms) P-T Amp (µV) Site1 Site2 Dist (cm) Delta-0 (ms)   Left Median/Ulnar Palm Comparison (Wrist)  31.3°C   Median Palm    2.5 13.7 Median Palm Ulnar Palm 8.0 0.5   Ulnar Palm    1.9 11.5         F Wave Studies     NR F-Lat (ms) Lat Norm (ms) L-R F-Lat (ms) L-R Lat Norm   Left Tibial (Mrkrs) (Abd Hallucis)  29.1°C      45.51 <56  <5.7   Left Ulnar (Mrkrs) (Abd Dig Min)  31.3°C      24.87 <32  <2.5     H Reflex Studies     NR H-Lat (ms) L-R H-Lat (ms) L-R Lat Norm   Left Tibial (Gastroc)  29°C      30.15  <2.0     EMG     Side Muscle Nerve Root Ins Act Fibs Psw Recrt Duration Amp Poly Comment   Left 1stDorInt Ulnar C8-T1 Nml Nml Nml Nml Nml Nml Nml    Left ExtIndicis Radial (Post Int) C7-8 Nml Nml Nml Nml Nml Nml Nml    Left Abd Poll Brev Median C8-T1 Nml Nml Nml Nml Nml Nml Nml                Nerve Conduction Studies  Anti Sensory Left/Right Comparison     Stim Site L Lat (ms) R Lat (ms) L-R Lat (ms) L Amp (µV) R Amp (µV) L-R Amp (%) Site1 Site2 L Taiwo (m/s) R Taiwo (m/s) L-R Taiwo (m/s)   Median Anti Sensory (2nd Digit)  31°C   Wrist 2.8   36.4   Wrist 2nd Digit 50     Radial Anti Sensory (Base 1st Digit)  31.4°C   Wrist 1.5   44.0   Wrist Base 1st Digit 67     Sup Fibular Anti Sensory (Lat ankle)  28.6°C   Lower leg 1.9   29.1   Lower leg Lat ankle 53     Sural Anti Sensory (Lat Mall)  28.8°C   Calf 2.8   6.1   Calf Lat Mall 50     Ulnar Anti Sensory (5th Digit)  31.3°C   Wrist 2.3   36.0   Wrist 5th Digit 61       Motor Left/Right Comparison     Stim Site L Lat (ms) R Lat (ms) L-R Lat (ms) L Amp (mV) R Amp (mV) L-R Amp (%) Site1 Site2 L Taiwo (m/s) R Taiwo (m/s) L-R Taiwo (m/s)   Fibular Motor (Ext Dig Brev)  28.6°C   Ankle 3.9   4.8   Ankle Ext Dig Brev      B Fib 10.4   3.0   B Fib Ankle 48     Poplt 12.8   4.1   Poplt B Fib 42     Median Motor (Abd Poll Brev)  31.5°C   Wrist 4.1   14.5   Wrist Abd Poll Brev      Elbow 7.7   13.4   Elbow Wrist 53     Tibial Motor (Abd Ross Brev)  29.6°C   Ankle 4.5   9.7   Ankle Abd Ross Brev      Knee 11.3   6.3   Knee Ankle 50     Ulnar Motor (Abd Dig Minimi)  24.2°C   Wrist 2.6   7.1   Wrist Abd Dig Minimi      B Elbow 5.6   6.1   B Elbow Wrist 67     A Elbow 7.3   6.4   A Elbow B Elbow 59       Comparison Left/Right Comparison     Stim Site L Lat (ms) R Lat (ms) L-R Lat (ms) L Amp (µV) R Amp (µV) L-R Amp (%)   Median/Ulnar Palm Comparison (Wrist)  31.3°C   Median Palm 1.9   11.3     Ulnar Palm 1.4   11.2           Waveforms:

## 2021-09-03 ENCOUNTER — TELEPHONE (OUTPATIENT)
Dept: NEUROLOGY | Age: 51
End: 2021-09-03

## 2021-09-03 NOTE — TELEPHONE ENCOUNTER
----- Message from Conception Aw sent at 9/3/2021  3:08 PM EDT -----  Regarding: /Telephone  General Message/Vendor Calls    Caller's first and last name: Dr. Buck Mendoza ([facility])      Reason for call: Test Results      Callback required yes/no and why: Yes      Best contact number(s):[phone]      Details to clarify the request: The doctor needs to talk to  about the test results and needs to get any restrictions for the patient.        Conception Aw

## 2021-09-08 ENCOUNTER — TELEPHONE (OUTPATIENT)
Dept: NEUROLOGY | Age: 51
End: 2021-09-08

## 2021-09-08 NOTE — TELEPHONE ENCOUNTER
This is a Dr Brit Garcia calling, not the pt. I tried to return the call. It is a Dr Crystal Walker direct # and she stated she needed to talk to a provider directly after you see pt on the 13th.

## 2021-09-08 NOTE — TELEPHONE ENCOUNTER
----- Message from Nicole Wagner sent at 9/7/2021  3:54 PM EDT -----  Regarding: Dr. Yenni Craig first and last name: Dr. Jose Speaks       Reason for call: discuss for carpal tunnel syndrome. Callback required yes/no and why: yes/ discussion       Best contact number(s): [phone]      Details to clarify the request:   call from [employer] discuss for carpal tunnel syndrome and if she has EMG.        Nicole Wagner

## 2021-10-21 ENCOUNTER — OFFICE VISIT (OUTPATIENT)
Dept: NEUROLOGY | Age: 51
End: 2021-10-21
Payer: COMMERCIAL

## 2021-10-21 VITALS
SYSTOLIC BLOOD PRESSURE: 118 MMHG | HEIGHT: 63 IN | DIASTOLIC BLOOD PRESSURE: 80 MMHG | WEIGHT: 196.8 LBS | RESPIRATION RATE: 14 BRPM | HEART RATE: 100 BPM | BODY MASS INDEX: 34.87 KG/M2 | TEMPERATURE: 97.8 F | OXYGEN SATURATION: 98 %

## 2021-10-21 DIAGNOSIS — M79.641 PAIN IN BOTH HANDS: ICD-10-CM

## 2021-10-21 DIAGNOSIS — R20.0 SENSORY LOSS: ICD-10-CM

## 2021-10-21 DIAGNOSIS — M79.642 PAIN IN BOTH HANDS: ICD-10-CM

## 2021-10-21 DIAGNOSIS — R29.898 HAND WEAKNESS: ICD-10-CM

## 2021-10-21 DIAGNOSIS — M65.4 DE QUERVAIN'S DISEASE (TENOSYNOVITIS): ICD-10-CM

## 2021-10-21 DIAGNOSIS — G56.03 BILATERAL CARPAL TUNNEL SYNDROME: Primary | ICD-10-CM

## 2021-10-21 PROCEDURE — 99214 OFFICE O/P EST MOD 30 MIN: CPT | Performed by: NURSE PRACTITIONER

## 2021-10-21 NOTE — PROGRESS NOTES
[address] [facility],[address]Th [address]  [address]   [place] [address] [facility] [address] [place] [address] [address]   [phone] Office   [phone] Fax           Date:  10/21/21     Name:  Jill Tomlinson  :  1970  MRN:  [id number]     PCP:  Tamara Clarke MD    Chief Complaint   Patient presents with    Follow-up     EMG results     HISTORY OF PRESENT ILLNESS: Following for complaints of weakness and pain in her hands. She continues to have bilateral hand and wrist pain. EMG demonstrated residual from her CTS release surgery. She has pain and weakness in the hands with joint pain in the fingers. Notes that there are times when her fingers will lock up on her and she will have to pry her fingers open. She will frequently drop things. Recap from LOV:  Ongoing complaints of disturbance of sensation in both hands primarily that is associated with some pain as well. On exam, she does have a diminished bilateral hand , right greater than left. There is also noted a decrease in sensation in a stocking glove distribution of both lower extremities. For further evaluation, she will be set up for EMG. She was also sent for metabolic studies to include TFT, B12, and vitamin D given some of her complaints of cramping. She was also sent for SPEP given her previous history of breast cancer and potential for neoplastic progression. Follow-up after completion of her testing. EMG Impression:  ABNORMAL    Nerve conduction study of the left upper and left lower extremities, including palmar study, shows slowing of the peak latency of the median palmar compared to the ulnar palmar response which may represent residual findings related to prior left carpal tunnel syndrome s/p surgery. Otherwise, no evidence of a generalized sensorimotor polyneuropathy of the large fiber type is noted.     Needle examination of the left upper extremity was prematurely stopped due to patient's low tolerance to pain. Needle examination of the first dorsal interosseous, Extensor indicis and Abductor pollicis brevis muscles are within normal limits. Current Outpatient Medications   Medication Sig    meclizine (ANTIVERT) 25 mg tablet TK 1 T PO UP TO TID PRN    cetirizine (ZYRTEC) 10 mg tablet TK 1 T PO QD  Indications: seasonal runny nose    traZODone (DESYREL) 100 mg tablet TK 2 TS PO QHS    Trintellix 20 mg tablet TK 1 T PO QD    diclofenac (VOLTAREN) 1 % gel Apply  to affected area four (4) times daily.  pyridoxine, vitamin B6, (VITAMIN B-6) 25 mg tablet Take 1 Tab by mouth daily.  cyanocobalamin 1,000 mcg tablet Take 1 Tab by mouth daily.  ibuprofen (MOTRIN) 800 mg tablet Take 1 Tab by mouth every six (6) hours as needed for Pain.  ferrous sulfate (FEOSOL) 325 mg (65 mg iron) tablet Take  by mouth Daily (before breakfast). Takes 3 tabs daily     busPIRone (BUSPAR) 5 mg tablet Take 15 mg by mouth three (3) times daily (with meals). Indications: taking 15 mg BID    gabapentin (NEURONTIN) 300 mg capsule Take 300 mg by mouth three (3) times daily.  hydroCHLOROthiazide (MICROZIDE) 12.5 mg capsule TK 1 C PO QD IN THE MORNING (Patient not taking: Reported on 5/25/2021)     No current facility-administered medications for this visit. Allergies   Allergen Reactions    Adhesive Tape-Silicones Rash and Itching    Darvocet A500 [Propoxyphene N-Acetaminophen] Nausea Only and Other (comments)     headache    Diazepam Other (comments)     Not an allergy, just does not work for her at all.     Hydrocodone Nausea and Vomiting and Vertigo    Percocet [Oxycodone-Acetaminophen] Nausea Only and Other (comments)     headache    Tylenol-Codeine #2 Itching, Nausea Only and Other (comments)     HEADACHES       Past Medical History:   Diagnosis Date    Arthritis     HANDS AND KNEES    Asthma     Breast cancer ([facility] 03/31/2017    Carcinoma in situ    [facility] cancer    Depression     GERD (gastroesophageal reflux disease)     Heart murmur     Hypertension     Neuropathy     PUD (peptic ulcer disease)     S/P radiation therapy 2016    and chemo finished all 1/2017    Vertigo      Past Surgical History:   Procedure Laterality Date    HX BREAST BIOPSY Right     SEVERAL TIMES    HX BREAST LUMPECTOMY Right 3/31/2016    RIGHT BREAST LUMPECTOMY, RIGHT SENTINEL NODE BIOPSY WITH ULTRASOUND performed by Lazarus Files, MD at [address] HX CHOLECYSTECTOMY      HX GI      COLONOSCOPY    HX GYN      cesarian x 3    HX HYSTERECTOMY      Still has ovaries    HX ORTHOPAEDIC Left     ARM FX WITH PINS    HX TONSILLECTOMY      HX UROLOGICAL      CYSTOSCOPY    HX UROLOGICAL      BLADDER SLING    KAREN STEREO  BX BREAST RT 1ST LESION W/CLIP AND SPECIMEN Right 2013    benign    KAREN US BX BREAST RT 1ST LESION W/CLIP AND SPECIMEN Right 01/29/2016    Ca in situ    SD BREAST SURGERY PROCEDURE UNLISTED Right 5/17/16    RIGHT axillary lymph node dissection and PAC insertion     Social History     Socioeconomic History    Marital status: LEGALLY      Spouse name: Not on file    Number of children: Not on file    Years of education: Not on file    Highest education level: Not on file   Occupational History    Not on file   Tobacco Use    Smoking status: Never Smoker    Smokeless tobacco: Never Used    Tobacco comment:  smokes   Vaping Use    Vaping Use: Never used   Substance and Sexual Activity    Alcohol use: Yes     Alcohol/week: 0.0 standard drinks     Comment: occasionally a glass of wine    Drug use: No    Sexual activity: Yes     Partners: Male     Birth control/protection: None   Other Topics Concern    Not on file   Social History Narrative    Recently moved from [place], they have been here since April. .       Social Determinants of Health     Financial Resource Strain:     Difficulty of Paying Living Expenses:    Food Insecurity:     Worried About Running Out of Food in the Last Year:     [address] in the Last Year:    Transportation Needs:     Lack of Transportation (Medical):  Lack of Transportation (Non-Medical):    Physical Activity:     Days of Exercise per Week:     Minutes of Exercise per Session:    Stress:     Feeling of Stress :    Social Connections:     Frequency of Communication with Friends and Family:     Frequency of Social Gatherings with Friends and Family:     Attends Congregational Services:     Active Member of Clubs or Organizations:     Attends Club or Organization Meetings:     Marital Status:    Intimate Partner Violence:     Fear of Current or Ex-Partner:     Emotionally Abused:     Physically Abused:     Sexually Abused:      Family History   Problem Relation Age of Onset    Hypertension Mother     Kidney Disease Mother         ON DIALYSIS    Heart Disease Mother     No Known Problems Father     Asthma Son     Asthma Daughter     No Known Problems Son     Anesth Problems Neg Hx        PHYSICAL EXAMINATION:    Visit Vitals  /80 (BP 1 Location: Left upper arm, BP Patient Position: Sitting)   Pulse 100   Temp 97.8 °F (36.6 °C) (Temporal)   Resp 14   Ht 5' 3\" (1.6 m)   Wt 89.3 kg (196 lb 12.8 oz)   SpO2 98%   BMI 34.86 kg/m²     General:  Well defined, nourished, and groomed individual in no acute distress. Neck: Supple, nontender, no bruits, no pain with resistance to active range of motion. Heart: Regular rate and rhythm, no murmurs, rub, or gallop. Normal S1S2. Lungs:  Clear to auscultation bilaterally with equal chest expansion, no cough, no wheeze  Musculoskeletal:  Extremities revealed no edema and had full range of motion of joints. Positive Finkelstein bilaterally  Psych:  Good mood and bright affect    NEUROLOGICAL EXAMINATION:     Mental Status:   Alert and oriented to person, place, and time with recent and remote memory intact. Attention span and concentration are normal. Speech is fluent with a full fund of knowledge. Cranial Nerves:  I: smell Not tested   II: visual fields Full to confrontation   II: pupils Equal, round, reactive to light   II: optic disc No papilledema   III,VII: ptosis none   III,IV,VI: extraocular muscles  Full ROM   V: mastication normal   V: facial light touch sensation  normal   VII: facial muscle function   symmetric   VIII: hearing symmetric   IX: soft palate elevation  normal   XI: trapezius strength  5/5   XI: sternocleidomastoid strength 5/5   XI: neck flexion strength  5/5   XII: tongue  midline     Motor Examination: Normal tone, bulk, and strength, 5/5 muscle strength throughout except in the hands bilaterally, right greater than left. Sensory exam:  Decreased sensation to temperature and vibration in the lower extremities bilaterally in a stocking glove distribution to the ankles. Coordination:  Finger to nose testing was normal.   No resting or intention tremor    Gait and Station:  Steady while walking. Normal arm swing. No pronator drift. No muscle wasting or fasiculations noted. Reflexes:  DTRs 2+ throughout. ASSESSMENT AND PLAN    ICD-10-CM ICD-9-CM    1. Bilateral carpal tunnel syndrome  G56.03 354.0 TSH 3RD GENERATION      T4, FREE      TSH 3RD GENERATION      T4, FREE      REFERRAL TO ORTHOPEDICS   2. De Quervain's disease (tenosynovitis)  M65.4 727.04    3. Pain in both hands  M79.641 729.5 LUDWIG, DIRECT, W/REFLEX    M79.642  RHEUMATOID FACTOR, QL      LUDWIG, DIRECT, W/REFLEX      REFERRAL TO ORTHOPEDICS   4. Hand weakness  R29.898 728.87    5.  Sensory loss  R20.0 782.0 TSH 3RD GENERATION      T4, FREE      LUDWIG, DIRECT, W/REFLEX      RHEUMATOID FACTOR, QL      VITAMIN B12 & FOLATE      SPEP AND EDDIE, SERUM      VITAMIN D, 25 HYDROXY      TSH 3RD GENERATION      T4, FREE      LUDWIG, DIRECT, W/REFLEX      VITAMIN B12 & FOLATE      SPEP AND EDDIE, SERUM      VITAMIN D, 25 HYDROXY On exam today, she continues to have bilateral wrist pain associated with weakness. She indicates that the fingers will sometimes lock in place and she actually has to physically open her hand. There was noted a positive Finkelstein bilaterally consistent with a de Quervain's tendinitis and hand weakness. EMG demonstrated residual nerve damage from her previous carpal tunnel release. For further treatment and evaluation, recommended that she use Voltaren gel on the wrists bilaterally 2 g 4 times daily as needed. She was also provided with home therapy exercises for the de Quervain's tendinitis. She was referred to an orthopedic hand specialist today as well. On December 30, she does have a rheumatologic evaluation scheduled for possible rheumatoid arthritis given the bilateral hand and joint pain. To ensure that there is no other underlying cause for her complaints of numbness and tingling in the lower extremities as well as ongoing complaints of hand pain, she will be sent for thyroid function testing, serum protein electrophoresis, vitamin B12 and folate, vitamin D 3 level, LUDWIG and a rheumatoid factor. Follow up in three months    [facility].  Steffany Dickey

## 2021-10-21 NOTE — LETTER
10/21/2021 11:27 AM    Ms. Katya Perez  [address] [place] [id number]    To whom it may concern,    Ms. Kaylene Castro was seen today for follow-up evaluation of bilateral hand pain after the completion of her EMG study. Her EMG demonstrated some residual nerve damage related to her carpal tunnel release surgery. On exam today, she continues to have bilateral wrist pain associated with weakness. She indicates that the fingers will sometimes lock in place and she actually has to physically open her hand. There was noted a positive Finkelstein bilaterally consistent with a de Quervain's tendinitis and hand weakness. For further treatment and evaluation, recommended that she use Voltaren gel on the wrists bilaterally 2 g 4 times daily as needed. She was also provided with home therapy exercises for the de Quervain's tendinitis. She was referred to an orthopedic hand specialist today as well. On December 30, she does have a rheumatologic evaluation scheduled for possible rheumatoid arthritis given the bilateral hand and joint pain. To ensure that there is no other underlying cause for her complaints of numbness and tingling in the lower extremities as well as ongoing complaints of hand pain, she will be sent for thyroid function testing, serum protein electrophoresis, vitamin B12 and folate, vitamin D 3 level, LUDWIG and a rheumatoid factor. I will plan to see her back in about 3 months. If there are any questions or concerns regarding this matter, I am happy to discuss it with the written permission of Ms. Komalanita Garciaa.         Sincerely,      Moncho Brown NP

## 2021-10-25 LAB
25(OH)D3+25(OH)D2 SERPL-MCNC: 34.9 NG/ML (ref 30–100)
ALBUMIN SERPL ELPH-MCNC: 4 G/DL (ref 2.9–4.4)
ALBUMIN/GLOB SERPL: 1.3 {RATIO} (ref 0.7–1.7)
ALPHA1 GLOB SERPL ELPH-MCNC: 0.2 G/DL (ref 0–0.4)
ALPHA2 GLOB SERPL ELPH-MCNC: 0.7 G/DL (ref 0.4–1)
ANA SER QL: NEGATIVE
B-GLOBULIN SERPL ELPH-MCNC: 1.1 G/DL (ref 0.7–1.3)
FOLATE SERPL-MCNC: >20 NG/ML
GAMMA GLOB SERPL ELPH-MCNC: 1.3 G/DL (ref 0.4–1.8)
GLOBULIN SER-MCNC: 3.3 G/DL (ref 2.2–3.9)
IGA SERPL-MCNC: 223 MG/DL (ref 87–352)
IGG SERPL-MCNC: 1418 MG/DL (ref 586–1602)
IGM SERPL-MCNC: 112 MG/DL (ref 26–217)
INTERPRETATION SERPL IEP-IMP: NORMAL
M PROTEIN SERPL ELPH-MCNC: NORMAL G/DL
PLEASE NOTE:, 149534: NORMAL
PROT SERPL-MCNC: 7.3 G/DL (ref 6–8.5)
RHEUMATOID FACT SERPL-ACNC: <10 IU/ML (ref 0–13.9)
T4 FREE SERPL-MCNC: 0.88 NG/DL (ref 0.82–1.77)
TSH SERPL DL<=0.005 MIU/L-ACNC: 1.79 UIU/ML (ref 0.45–4.5)
VIT B12 SERPL-MCNC: 680 PG/ML (ref 232–1245)

## 2021-12-03 ENCOUNTER — VIRTUAL VISIT (OUTPATIENT)
Dept: FAMILY MEDICINE CLINIC | Age: 51
End: 2021-12-03
Payer: COMMERCIAL

## 2021-12-03 DIAGNOSIS — I10 PRIMARY HYPERTENSION: Primary | ICD-10-CM

## 2021-12-03 PROCEDURE — 99213 OFFICE O/P EST LOW 20 MIN: CPT | Performed by: NURSE PRACTITIONER

## 2021-12-03 RX ORDER — AMLODIPINE BESYLATE 5 MG/1
5 TABLET ORAL DAILY
Qty: 30 TABLET | Refills: 2 | Status: SHIPPED | OUTPATIENT
Start: 2021-12-03

## 2021-12-03 NOTE — PROGRESS NOTES
Shaq Claire is a 45 y.o. female, evaluated via audio-only technology on 12/3/2021 for Hypertension  . Assessment & Plan:   Diagnoses and all orders for this visit:    1. Primary hypertension  -     amLODIPine (NORVASC) 5 mg tablet; Take 1 Tablet by mouth daily. Patient was seen by telephone encounter. Patient reports going to other healthcare providers offices being told her blood pressure was elevated. Some instances she was above 140/90. She previously had been on blood pressure medication in the past.  Based on her findings I am prescribing her amlodipine. I have asked her to follow-up in 2 weeks for evaluation of control. Of asked her to get a blood pressure cuff for home and monitor at home. She was requesting a wellness letter for gastric bypass surgery. Unable to do that today because it has been over a year and a half since she has been in the office. I have asked her to schedule a wellness exam and we can follow-up with letter needed at that time. 12  Subjective:       Prior to Admission medications    Medication Sig Start Date End Date Taking? Authorizing Provider   amLODIPine (NORVASC) 5 mg tablet Take 1 Tablet by mouth daily. 12/3/21  Yes Jayme Thomas NP   meclizine (ANTIVERT) 25 mg tablet TK 1 T PO UP TO TID PRN 7/23/20   Jayme Thomas NP   cetirizine (ZYRTEC) 10 mg tablet TK 1 T PO QD  Indications: seasonal runny nose 7/23/20   Jayme Thomas NP   traZODone (DESYREL) 100 mg tablet TK 2 TS PO QHS 7/7/20   Provider, Historical   Trintellix 20 mg tablet TK 1 T PO QD 7/7/20   Provider, Historical   diclofenac (VOLTAREN) 1 % gel Apply  to affected area four (4) times daily. 7/31/19   Aramis Grimes MD   pyridoxine, vitamin B6, (VITAMIN B-6) 25 mg tablet Take 1 Tab by mouth daily. 7/31/19   Aramis Grimes MD   cyanocobalamin 1,000 mcg tablet Take 1 Tab by mouth daily.  7/31/19   Aramis Grimes MD   ibuprofen (MOTRIN) 800 mg tablet Take 1 Tab by mouth every six (6) hours as needed for Pain. 11/25/18   Constantine Harley MD   ferrous sulfate (FEOSOL) 325 mg (65 mg iron) tablet Take  by mouth Daily (before breakfast). Takes 3 tabs daily     Provider, Historical   hydroCHLOROthiazide (MICROZIDE) 12.5 mg capsule TK 1 C PO QD IN THE MORNING  Patient not taking: Reported on 5/25/2021 9/11/18 12/3/21  Provider, Historical   busPIRone (BUSPAR) 5 mg tablet Take 15 mg by mouth three (3) times daily (with meals). Indications: taking 15 mg BID 5/22/17   Provider, Historical   gabapentin (NEURONTIN) 300 mg capsule Take 300 mg by mouth three (3) times daily.  4/14/17   Provider, Historical     Patient Active Problem List   Diagnosis Code    L[facility] ([facility] C50.919    Malignant neoplasm of right breast, stage 2 ([facility] C50.911    Essential hypertension I10    Arthritis M19.90    Gastroesophageal reflux disease without esophagitis K21.9    Anxiety about health F41.8    S/P lumpectomy, right breast Z98.890    History of partial hysterectomy Z90.711    Counseling for estrogen replacement therapy Z71.89    Elevated hemoglobin A1c R73.09    Low vitamin D level R79.89    Breast cancer metastasized to axillary lymph node (HCC) C50.919, C77.3    Hot flashes R23.2    Port catheter in place Z95.828    Chemotherapy follow-up examination Z09    Cellulitis of breast N61.0    Sepsis ([facility] A41.9    Sepsis affecting skin A41.9    Swelling of arm M79.89    Generalized pain R52    Breast cancer ([facility] C50.919    Weight gain R63.5    Postmenopausal Z78.0    Obesity, morbid ([address] E66.01    History of lumpectomy of right breast Z98.890    Memory loss R41.3    Fatigue R53.83    Murmur, cardiac R01.1     Patient Active Problem List    Diagnosis Date Noted    Murmur, cardiac 08/14/2019    Obesity, morbid ([address] 10/02/2018    History of lumpectomy of right breast 10/02/2018    Memory loss 10/02/2018    Fatigue 10/02/2018    Weight gain 07/18/2017    Postmenopausal 07/18/2017    Breast cancer ([facility] 01/13/2017    Generalized pain 10/27/2016    Swelling of arm 07/07/2016    Cellulitis of breast 06/11/2016    Sepsis ([facility] 06/11/2016    Sepsis affecting skin 06/11/2016    Chemotherapy follow-up examination 06/03/2016    Breast cancer metastasized to axillary lymph node ([facility] 05/20/2016    Hot flashes 05/20/2016    Port catheter in place 05/20/2016    Elevated hemoglobin A1c 05/10/2016    Low vitamin D level 05/10/2016    Essential hypertension 04/27/2016    Arthritis 04/27/2016    Gastroesophageal reflux disease without esophagitis 04/27/2016    Anxiety about health 04/27/2016    S/P lumpectomy, right breast 04/27/2016    History of partial hysterectomy 04/27/2016    Counseling for estrogen replacement therapy 04/27/2016    Malignant neoplasm of right breast, stage 2 ([facility] 04/07/2016    Lobular breast cancer ([facility] 02/10/2016     Current Outpatient Medications   Medication Sig Dispense Refill    amLODIPine (NORVASC) 5 mg tablet Take 1 Tablet by mouth daily. 30 Tablet 2    meclizine (ANTIVERT) 25 mg tablet TK 1 T PO UP TO TID PRN 10 Tab 0    cetirizine (ZYRTEC) 10 mg tablet TK 1 T PO QD  Indications: seasonal runny nose 30 Tab 5    traZODone (DESYREL) 100 mg tablet TK 2 TS PO QHS      Trintellix 20 mg tablet TK 1 T PO QD      diclofenac (VOLTAREN) 1 % gel Apply  to affected area four (4) times daily. 100 g 1    pyridoxine, vitamin B6, (VITAMIN B-6) 25 mg tablet Take 1 Tab by mouth daily. 90 Tab 3    cyanocobalamin 1,000 mcg tablet Take 1 Tab by mouth daily. 90 Tab 3    ibuprofen (MOTRIN) 800 mg tablet Take 1 Tab by mouth every six (6) hours as needed for Pain. 30 Tab 0    ferrous sulfate (FEOSOL) 325 mg (65 mg iron) tablet Take  by mouth Daily (before breakfast). Takes 3 tabs daily       busPIRone (BUSPAR) 5 mg tablet Take 15 mg by mouth three (3) times daily (with meals).  Indications: taking 15 mg BID      gabapentin (NEURONTIN) 300 mg capsule Take 300 mg by mouth three (3) times daily. Allergies   Allergen Reactions    Adhesive Tape-Silicones Rash and Itching    Darvocet A500 [Propoxyphene N-Acetaminophen] Nausea Only and Other (comments)     headache    Diazepam Other (comments)     Not an allergy, just does not work for her at all.  Hydrocodone Nausea and Vomiting and Vertigo    Percocet [Oxycodone-Acetaminophen] Nausea Only and Other (comments)     headache    Tylenol-Codeine #2 Itching, Nausea Only and Other (comments)     HEADACHES       Past Medical History:   Diagnosis Date    Arthritis     HANDS AND KNEES    Asthma     [facility] 03[facility]/2017    Carcinoma in situ    Cancer ([facility] 2016    RIGHT breast cancer    Depression     GERD (gastroesophageal reflux disease)     Heart murmur     Hypertension     Neuropathy     PUD (peptic ulcer disease)     S/P radiation therapy 2016    and chemo finished all 1/2017    Vertigo        Review of Systems   Constitutional: Negative for fever and malaise/fatigue. HENT: Negative for congestion, sinus pain and sore throat. Respiratory: Negative for cough and shortness of breath. Cardiovascular: Negative for chest pain. Gastrointestinal: Negative for diarrhea, nausea and vomiting. Genitourinary: Negative for dysuria. Musculoskeletal: Negative. Skin: Negative. Neurological: Negative for dizziness and headaches. Endo/Heme/Allergies: Negative for environmental allergies. Psychiatric/Behavioral: Negative. No flowsheet data found. Sarabjit Martínez, who was evaluated through a patient-initiated, synchronous (real-time) audio only encounter, and/or her healthcare decision maker, is aware that it is a billable service, with coverage as determined by her insurance carrier. She provided verbal consent to proceed: Yes. She has not had a related appointment within my department in the past 7 days or scheduled within the next 24 hours.     On this date 12/03/2021 I have spent 20 minutes reviewing previous notes, test results and face to face (virtual) with the patient discussing the diagnosis and importance of compliance with the treatment plan as well as documenting on the day of the visit.     Heide Mares, NP

## 2021-12-08 ENCOUNTER — TELEPHONE (OUTPATIENT)
Dept: NEUROLOGY | Age: 51
End: 2021-12-08

## 2021-12-08 NOTE — TELEPHONE ENCOUNTER
Patient is calling and would rather not see Dr. Lynetta Bernheim for carpal tunnel surgery since the first one didn't work, she would like to know who else you would recommend? Also, she was supposed to return to work December 15th however she is still undergoing work up and appointments and needs this extended to the end of February if at all possible to accomodate rheumatology appts as well as getting into new ortho doctor for carpal tunnel and surgery possibly.  Please advise     If this could be written in a letter she an come and  when completed

## 2021-12-10 ENCOUNTER — PATIENT MESSAGE (OUTPATIENT)
Dept: NEUROLOGY | Age: 51
End: 2021-12-10

## 2021-12-10 DIAGNOSIS — M79.641 PAIN IN BOTH HANDS: ICD-10-CM

## 2021-12-10 DIAGNOSIS — M79.642 PAIN IN BOTH HANDS: ICD-10-CM

## 2021-12-10 DIAGNOSIS — G56.03 BILATERAL CARPAL TUNNEL SYNDROME: Primary | ICD-10-CM

## 2021-12-10 DIAGNOSIS — M65.4 DE QUERVAIN'S DISEASE (TENOSYNOVITIS): ICD-10-CM

## 2021-12-26 NOTE — PROGRESS NOTES
HPI: Sarabjit Martínez (: 1970) is a 45 y.o. female, patient, here for evaluation of the following chief complaint(s):    Hand Pain (bilateral hand pain)  Patient is seen once again to evaluate her hands. She had undergone bilateral wrist endoscopic carpal tunnel surgery on 2019. She feels she has been experiencing similar symptoms on both sides. She did have an EMG on 10/21/2021 that really only showed the residual of prior carpal tunnel syndrome although she still feels much more symptomatic on the left than the right side. She admits to a component of left thumb basal joint arthritic pain worsened with , pinch and twist turning motions. She has had documented de Quervain's tenosynovitis and has been performing stretching exercises without long-term relief and is now seen again for further treatment. Vitals:  Ht 5' 4\" (1.626 m)   Wt 195 lb (88.5 kg)   LMP 2014 (Approximate)   BMI 33.47 kg/m²    Body mass index is 33.47 kg/m². Allergies   Allergen Reactions    Adhesive Tape-Silicones Rash and Itching    Darvocet A500 [Propoxyphene N-Acetaminophen] Nausea Only and Other (comments)     headache    Diazepam Other (comments)     Not an allergy, just does not work for her at all.  Hydrocodone Nausea and Vomiting and Vertigo    Percocet [Oxycodone-Acetaminophen] Nausea Only and Other (comments)     headache    Tylenol-Codeine #2 Itching, Nausea Only and Other (comments)     HEADACHES         Current Outpatient Medications   Medication Sig    amLODIPine (NORVASC) 5 mg tablet Take 1 Tablet by mouth daily.  meclizine (ANTIVERT) 25 mg tablet TK 1 T PO UP TO TID PRN    cetirizine (ZYRTEC) 10 mg tablet TK 1 T PO QD  Indications: seasonal runny nose    traZODone (DESYREL) 100 mg tablet TK 2 TS PO QHS    Trintellix 20 mg tablet TK 1 T PO QD    diclofenac (VOLTAREN) 1 % gel Apply  to affected area four (4) times daily.     pyridoxine, vitamin B6, (VITAMIN B-6) 25 mg tablet Take 1 Tab by mouth daily.  cyanocobalamin 1,000 mcg tablet Take 1 Tab by mouth daily.  ibuprofen (MOTRIN) 800 mg tablet Take 1 Tab by mouth every six (6) hours as needed for Pain.  ferrous sulfate (FEOSOL) 325 mg (65 mg iron) tablet Take  by mouth Daily (before breakfast). Takes 3 tabs daily     busPIRone (BUSPAR) 5 mg tablet Take 15 mg by mouth three (3) times daily (with meals). Indications: taking 15 mg BID    gabapentin (NEURONTIN) 300 mg capsule Take 300 mg by mouth three (3) times daily. No current facility-administered medications for this visit.        Past Medical History:   Diagnosis Date    Arthritis     HANDS AND KNEES    Asthma     Breast cancer ([facility] 03/31/2017    Carcinoma in situ    Cancer ([facility] 2016    RIGHT breast cancer    Depression     GERD (gastroesophageal reflux disease)     Heart murmur     Hypertension     Neuropathy     PUD (peptic ulcer disease)     S/P radiation therapy 2016    and chemo finished all 1/2017    Vertigo         Past Surgical History:   Procedure Laterality Date    HAND/FINGER SURGERY UNLISTED Bilateral 2020    bilateral endoscopic carpal tunnel releases     HX BREAST BIOPSY Right     SEVERAL TIMES    HX BREAST LUMPECTOMY Right 3/31/2016    RIGHT BREAST LUMPECTOMY, RIGHT SENTINEL NODE BIOPSY WITH ULTRASOUND performed by Mavis Bustillo MD at [address] HX CHOLECYSTECTOMY      HX GI      COLONOSCOPY    HX GYN      cesarian x 3    HX HYSTERECTOMY      Still has ovaries    HX ORTHOPAEDIC Left     ARM FX WITH PINS    HX TONSILLECTOMY      HX UROLOGICAL      CYSTOSCOPY    HX UROLOGICAL      BLADDER SLING    KAREN STEREO  BX BREAST RT 1ST LESION W/CLIP AND SPECIMEN Right 2013    benign    KAREN US BX BREAST RT 1ST LESION W/CLIP AND SPECIMEN Right 01/29/2016    Ca in situ    UT BREAST SURGERY PROCEDURE UNLISTED Right 5/17/16    RIGHT axillary lymph node dissection and PAC insertion       Family History   Problem Relation Age of Onset    Hypertension Mother     Kidney Disease Mother         ON DIALYSIS    Heart Disease Mother     No Known Problems Father     Asthma Son     Asthma Daughter     No Known Problems Son     Anesth Problems Neg Hx         Social History     Tobacco Use    Smoking status: Never Smoker    Smokeless tobacco: Never Used    Tobacco comment:  smokes   Vaping Use    Vaping Use: Never used   Substance Use Topics    Alcohol use: Yes     Alcohol/week: 0.0 standard drinks     Comment: occasionally a glass of wine    Drug use: No        Review of Systems    ROS     Positive for: Musculoskeletal (bilateral hand pain)    Negative for: Constitutional, Gastrointestinal, Neurological, Skin, Genitourinary, HENT, Endocrine, Cardiovascular, Eyes, Respiratory, Psychiatric, Allergic/Imm, Heme/Lymph    Last edited by Jay Castillo RN on 12/27/2021  5:01 PM. (History)             Physical Exam    Overall the patient has good forearm, wrist and hand motion bilaterally. The left side is a positive Finkelstein at the first dorsal compartment but she even has more pain with grind test in the basal joint. She has the healed bilateral carpal tunnel endoscopic scars and seems to have more of a provocative symptomatic exam findings more on the left than the right side. She otherwise demonstrates good range of motion no instability of her fingers. Sensation seems to be intact. Imaging:    XR Results (most recent):  Results from Appointment encounter on 12/27/21    XR HAND BILAT AP LAT OBL (MIN 3 V)    Narrative  Lateral AP, lateral and oblique x-rays of each hand were obtained that reveal minimal DIP PIP joint space narrowing with advanced left thumb CMC basal joint osteoarthritis. There is sclerosis, subluxation, osteophyte formation and loose body formation. No acute fracture seen.         ASSESSMENT/PLAN:  Below is the assessment and plan developed based on review of pertinent history, physical exam, labs, studies, and medications. Patient underwent bilateral scopic carpal tunnel surgery in 2019 and now feels there is recurrence yet the EMG from October did not show any advanced findings. She seems to be more clinically symptomatic complaining of \"locking up\", \"dropping things\". She also describes a history of neuropathy. She has advanced thumb CMC basal joint arthritis on the left side with de Quervain's tenosynovitis. She was offered but deferred conservative treatment including injections and prefers to consider operative management. I offered her the option to undergo a left thumb CMC arthroplasty with de Quervain's release to utilize the APL as an interpositional tendon graft. At the same time she can undergo revision open carpal tunnel release with flexor tenosynovectomy. I reviewed risks that include but not limited to stiffness, pain, nerve or tendon damage and overall incomplete relief of pain and paresthesias. Arrangements can be made for this to be performed on an outpatient basis at her convenience. 1. Bilateral hand pain  2. Bilateral carpal tunnel syndrome  -     XR HAND BILAT AP LAT OBL (MIN 3 V); Future  3. Status post carpal tunnel release of both wrists  4. De Quervain's tenosynovitis, left  5. Primary osteoarthritis of first carpometacarpal joint of left hand      Return for Follow-up 7 to 10 days after surgery. .    An electronic signature was used to authenticate this note.   -- eKnny Crawford MD

## 2021-12-27 ENCOUNTER — OFFICE VISIT (OUTPATIENT)
Dept: ORTHOPEDIC SURGERY | Age: 51
End: 2021-12-27
Payer: COMMERCIAL

## 2021-12-27 VITALS — BODY MASS INDEX: 33.29 KG/M2 | WEIGHT: 195 LBS | HEIGHT: 64 IN

## 2021-12-27 DIAGNOSIS — M65.4 DE QUERVAIN'S TENOSYNOVITIS, LEFT: ICD-10-CM

## 2021-12-27 DIAGNOSIS — M18.12 PRIMARY OSTEOARTHRITIS OF FIRST CARPOMETACARPAL JOINT OF LEFT HAND: ICD-10-CM

## 2021-12-27 DIAGNOSIS — M79.642 BILATERAL HAND PAIN: Primary | ICD-10-CM

## 2021-12-27 DIAGNOSIS — Z98.890 STATUS POST CARPAL TUNNEL RELEASE OF BOTH WRISTS: ICD-10-CM

## 2021-12-27 DIAGNOSIS — M79.641 BILATERAL HAND PAIN: Primary | ICD-10-CM

## 2021-12-27 DIAGNOSIS — G56.03 BILATERAL CARPAL TUNNEL SYNDROME: ICD-10-CM

## 2021-12-27 PROCEDURE — 99203 OFFICE O/P NEW LOW 30 MIN: CPT | Performed by: ORTHOPAEDIC SURGERY

## 2021-12-27 NOTE — LETTER
NOTIFICATION RETURN TO WORK / SCHOOL    12/27/2021 4:33 PM    [address]  [address] [place] [id number]      To Whom It May Concern:    Salina Mckeon is currently under the care of Imelda Monteiro. She is out of work until her surgery date of 1/18/2022. She will be re-evaluated 7-10 days postoperatively. If there are questions or concerns please have the patient contact our office.         Sincerely,      Stephanie Ibanez MD

## 2021-12-27 NOTE — PATIENT INSTRUCTIONS
Carpal Tunnel Syndrome: Care Instructions  Overview     Carpal tunnel syndrome is numbness, tingling, weakness, and pain in your hand, wrist, and sometimes forearm. It is caused by pressure on the median nerve. This nerve and several tough tissues called tendons run through a space in the wrist. This space is called the carpal tunnel. The repeated hand motions used in work and some hobbies and sports can put pressure on the median nerve. Pregnancy can cause carpal tunnel syndrome. Several conditions, such as diabetes, arthritis, and an underactive thyroid, can also cause it. You may be able to limit an activity or change the way you do it to reduce your symptoms. You also can take other steps to feel better. If your symptoms are mild, 1 to 2 weeks of home treatment are likely to ease your pain. Surgery is needed only if other treatments do not work. Follow-up care is a key part of your treatment and safety. Be sure to make and go to all appointments, and call your doctor if you are having problems. It's also a good idea to know your test results and keep a list of the medicines you take. How can you care for yourself at home? · If possible, stop or reduce the activity that causes your symptoms. If you cannot stop the activity, take frequent breaks to rest and stretch or change hand positions to do a task. Try switching hands, such as when using a computer mouse. · Try to avoid bending or twisting your wrists. · Ask your doctor if you can take an over-the-counter pain medicine, such as acetaminophen (Tylenol), ibuprofen (Advil, Motrin), or naproxen (Aleve). Be safe with medicines. Read and follow all instructions on the label. · If your doctor prescribes corticosteroid medicine to help reduce pain and swelling, take it exactly as prescribed. Call your doctor if you think you are having a problem with your medicine. · Put ice or a cold pack on your wrist for 10 to 20 minutes at a time to ease pain.  Put a thin cloth between the ice and your skin. · If your doctor or your physical or occupational therapist tells you to wear a wrist splint, wear it as directed to keep your wrist in a neutral position. This also eases pressure on your median nerve. · Ask your doctor whether you should have physical or occupational therapy to learn how to do tasks differently. · Try a yoga class to stretch your muscles and build strength in your hands and wrists. Yoga has been shown to ease carpal tunnel symptoms. To prevent carpal tunnel  · When working at a computer, keep your hands and wrists in line with your forearms. Hold your elbows close to your sides. Take a break every 10 to 15 minutes. · Try these exercises:  ? Warm up: Rotate your wrist up, down, and from side to side. Repeat this 4 times. Stretch your fingers far apart, relax them, then stretch them again. Repeat 4 times. Stretch your thumb by pulling it back gently, holding it, and then releasing it. Repeat 4 times. ? Prayer stretch: Start with your palms together in front of your chest just below your chin. Slowly lower your hands toward your waistline while keeping your hands close to your stomach and your palms together until you feel a mild to moderate stretch under your forearms. Hold for 10 to 20 seconds. Repeat 4 times. ? Wrist flexor stretch: Hold your arm in front of you with your palm up. Bend your wrist, pointing your hand toward the floor. With your other hand, gently bend your wrist further until you feel a mild to moderate stretch in your forearm. Hold for 10 to 20 seconds. Repeat 4 times. ? Wrist extensor stretch: Repeat the steps for the wrist flexor stretch, but begin with your extended hand palm down. · Squeeze a rubber exercise ball several times a day to keep your hands and fingers strong. · Avoid holding objects (such as a book) in one position for a long time. When possible, use your whole hand to grasp an object.  Using just the thumb and index finger can put stress on the wrist.  · Do not smoke. It can make this condition worse by reducing blood flow to the median nerve. If you need help quitting, talk to your doctor about stop-smoking programs and medicines. These can increase your chances of quitting for good. When should you call for help? Watch closely for changes in your health, and be sure to contact your doctor if:    · Your pain or other problems do not get better with home care.     · You want more information about physical or occupational therapy.     · You have side effects of your corticosteroid medicine, such as:  ? Weight gain. ? Mood changes. ? Trouble sleeping. ? Bruising easily.     · You have any other problems with your medicine. Where can you learn more? Go to http://www.gray.com/  Enter R432 in the search box to learn more about \"Carpal Tunnel Syndrome: Care Instructions. \"  Current as of: July 1, 2021               Content Version: 13.0  © 2006-2021 [employer]. Care instructions adapted under license by [employer] (which disclaims liability or warranty for this information). If you have questions about a medical condition or this instruction, always ask your healthcare professional. [place] [address] any warranty or liability for your use of this information. Carpal Tunnel Syndrome: Care Instructions  Overview     Carpal tunnel syndrome is numbness, tingling, weakness, and pain in your hand, wrist, and sometimes forearm. It is caused by pressure on the median nerve. This nerve and several tough tissues called tendons run through a space in the wrist. This space is called the carpal tunnel. The repeated hand motions used in work and some hobbies and sports can put pressure on the median nerve. Pregnancy can cause carpal tunnel syndrome. Several conditions, such as diabetes, arthritis, and an underactive thyroid, can also cause it.   You may be able to limit an activity or change the way you do it to reduce your symptoms. You also can take other steps to feel better. If your symptoms are mild, 1 to 2 weeks of home treatment are likely to ease your pain. Surgery is needed only if other treatments do not work. Follow-up care is a key part of your treatment and safety. Be sure to make and go to all appointments, and call your doctor if you are having problems. It's also a good idea to know your test results and keep a list of the medicines you take. How can you care for yourself at home? · If possible, stop or reduce the activity that causes your symptoms. If you cannot stop the activity, take frequent breaks to rest and stretch or change hand positions to do a task. Try switching hands, such as when using a computer mouse. · Try to avoid bending or twisting your wrists. · Ask your doctor if you can take an over-the-counter pain medicine, such as acetaminophen (Tylenol), ibuprofen (Advil, Motrin), or naproxen (Aleve). Be safe with medicines. Read and follow all instructions on the label. · If your doctor prescribes corticosteroid medicine to help reduce pain and swelling, take it exactly as prescribed. Call your doctor if you think you are having a problem with your medicine. · Put ice or a cold pack on your wrist for 10 to 20 minutes at a time to ease pain. Put a thin cloth between the ice and your skin. · If your doctor or your physical or occupational therapist tells you to wear a wrist splint, wear it as directed to keep your wrist in a neutral position. This also eases pressure on your median nerve. · Ask your doctor whether you should have physical or occupational therapy to learn how to do tasks differently. · Try a yoga class to stretch your muscles and build strength in your hands and wrists. Yoga has been shown to ease carpal tunnel symptoms.   To prevent carpal tunnel  · When working at a computer, keep your hands and wrists in line with your forearms. Hold your elbows close to your sides. Take a break every 10 to 15 minutes. · Try these exercises:  ? Warm up: Rotate your wrist up, down, and from side to side. Repeat this 4 times. Stretch your fingers far apart, relax them, then stretch them again. Repeat 4 times. Stretch your thumb by pulling it back gently, holding it, and then releasing it. Repeat 4 times. ? Prayer stretch: Start with your palms together in front of your chest just below your chin. Slowly lower your hands toward your waistline while keeping your hands close to your stomach and your palms together until you feel a mild to moderate stretch under your forearms. Hold for 10 to 20 seconds. Repeat 4 times. ? Wrist flexor stretch: Hold your arm in front of you with your palm up. Bend your wrist, pointing your hand toward the floor. With your other hand, gently bend your wrist further until you feel a mild to moderate stretch in your forearm. Hold for 10 to 20 seconds. Repeat 4 times. ? Wrist extensor stretch: Repeat the steps for the wrist flexor stretch, but begin with your extended hand palm down. · Squeeze a rubber exercise ball several times a day to keep your hands and fingers strong. · Avoid holding objects (such as a book) in one position for a long time. When possible, use your whole hand to grasp an object. Using just the thumb and index finger can put stress on the wrist.  · Do not smoke. It can make this condition worse by reducing blood flow to the median nerve. If you need help quitting, talk to your doctor about stop-smoking programs and medicines. These can increase your chances of quitting for good. When should you call for help?   Watch closely for changes in your health, and be sure to contact your doctor if:    · Your pain or other problems do not get better with home care.     · You want more information about physical or occupational therapy.     · You have side effects of your corticosteroid medicine, such as:  ? Weight gain. ? Mood changes. ? Trouble sleeping. ? Bruising easily.     · You have any other problems with your medicine. Where can you learn more? Go to http://www.gray.com/  Enter R432 in the search box to learn more about \"Carpal Tunnel Syndrome: Care Instructions. \"  Current as of: July 1, 2021               Content Version: 13.0  © 2006-2021 [employer]. Care instructions adapted under license by [employer] (which disclaims liability or warranty for this information). If you have questions about a medical condition or this instruction, always ask your healthcare professional. [place] [address] any warranty or liability for your use of this information. Learning About Arthritis at the [facility] of the Thumb  What is it? Arthritis at the base of the thumb joint is wear and tear on the cartilage. Cartilage is a firm, thick, slippery tissue. It covers and protects the ends of bones where they meet to form a joint. When you have arthritis, there are changes in the cartilage that cause it to break down. The bones rub together and cause joint damage and pain. What causes it? Experts don't know what causes arthritis at the base of the thumb. But aging, a lot of use, an injury, or family history may play a part. What are the symptoms? Symptoms of arthritis at the base of the thumb include aching in your joint. Or the pain may feel burning or sharp. You may feel clicking, creaking, or catching in the joint. It may get stiff. You may have more pain and less strength when you pinch or  things. Symptoms may come and go, stay the same, or get worse over time. How is it diagnosed? Your doctor can often diagnose arthritis by asking you questions about your joint pain and other symptoms and examining you. You may also have X-rays and blood tests.  Blood tests can help make sure another disease isn't causing your symptoms. How is it treated? Arthritis at the base of your thumb may be treated with rest, pain relievers, steroid medicines, using a brace or splint, and--in some cases--surgery. To help relieve pain in the joint, rest your sore hand. Switch hands for some activities. You can try heat and cold therapy, such as hot compresses, paraffin wax, cold packs, or ice massage. Your doctor may give you a splint to wear during some activities or when pain flares up. You can often manage mild or moderate arthritis pain with over-the-counter pain relievers. These include medicines that reduce swelling, such as ibuprofen or naproxen. You can also use acetaminophen. Sometimes these medicines are in creams that you can rub on your thumb and hand. Your doctor may also prescribe other medicine for your pain. For some people, steroid shots may be an option. If none of the treatments work, your doctor may discuss surgery with you. Follow-up care is a key part of your treatment and safety. Be sure to make and go to all appointments, and call your doctor if you are having problems. It's also a good idea to know your test results and keep a list of the medicines you take. Where can you learn more? Go to http://www.gray.com/  Enter T110 in the search box to learn more about \"Learning About Arthritis at the [facility] of the Thumb. \"  Current as of: April 30, 2021               Content Version: 13.0  © 4513-6216 [employer]. Care instructions adapted under license by [employer] (which disclaims liability or warranty for this information). If you have questions about a medical condition or this instruction, always ask your healthcare professional. [place] [address] any warranty or liability for your use of this information.

## 2021-12-28 DIAGNOSIS — M18.12 PRIMARY OSTEOARTHRITIS OF FIRST CARPOMETACARPAL JOINT OF LEFT HAND: ICD-10-CM

## 2021-12-28 DIAGNOSIS — Z98.890 STATUS POST CARPAL TUNNEL RELEASE OF BOTH WRISTS: ICD-10-CM

## 2021-12-28 DIAGNOSIS — G56.03 BILATERAL CARPAL TUNNEL SYNDROME: ICD-10-CM

## 2021-12-28 DIAGNOSIS — M65.4 DE QUERVAIN'S TENOSYNOVITIS, LEFT: Primary | ICD-10-CM

## 2021-12-30 ENCOUNTER — OFFICE VISIT (OUTPATIENT)
Dept: RHEUMATOLOGY | Age: 51
End: 2021-12-30
Payer: COMMERCIAL

## 2021-12-30 VITALS
WEIGHT: 198.4 LBS | HEART RATE: 80 BPM | OXYGEN SATURATION: 95 % | BODY MASS INDEX: 33.87 KG/M2 | HEIGHT: 64 IN | RESPIRATION RATE: 18 BRPM | TEMPERATURE: 98 F | SYSTOLIC BLOOD PRESSURE: 129 MMHG | DIASTOLIC BLOOD PRESSURE: 83 MMHG

## 2021-12-30 DIAGNOSIS — M62.838 TRAPEZIUS MUSCLE SPASM: ICD-10-CM

## 2021-12-30 DIAGNOSIS — R25.2 SPASMS OF THE HANDS OR FEET: ICD-10-CM

## 2021-12-30 DIAGNOSIS — M19.041 PRIMARY OSTEOARTHRITIS OF BOTH HANDS: Primary | ICD-10-CM

## 2021-12-30 DIAGNOSIS — M19.042 PRIMARY OSTEOARTHRITIS OF BOTH HANDS: Primary | ICD-10-CM

## 2021-12-30 DIAGNOSIS — M22.2X1 PATELLOFEMORAL ARTHRALGIA OF BOTH KNEES: ICD-10-CM

## 2021-12-30 DIAGNOSIS — M22.2X2 PATELLOFEMORAL ARTHRALGIA OF BOTH KNEES: ICD-10-CM

## 2021-12-30 PROCEDURE — 99205 OFFICE O/P NEW HI 60 MIN: CPT | Performed by: INTERNAL MEDICINE

## 2021-12-30 RX ORDER — OMEPRAZOLE 20 MG/1
20 CAPSULE, DELAYED RELEASE ORAL DAILY
Qty: 30 CAPSULE | Refills: 2 | Status: SHIPPED | OUTPATIENT
Start: 2021-12-30

## 2021-12-30 RX ORDER — MELOXICAM 15 MG/1
15 TABLET ORAL DAILY
Qty: 30 TABLET | Refills: 2 | Status: SHIPPED | OUTPATIENT
Start: 2021-12-30 | End: 2022-01-29

## 2021-12-30 NOTE — LETTER
1/9/2022    Patient: Yuli Mccormack   YOB: 1970   Date of Visit: 12/30/2021     Joan Cole NP  [address] [place] [address]  Via In Basket    Dear Joan Cole NP,      Thank you for referring Ms. Marcy Tavarez to [facility] for evaluation. My notes for this consultation are attached. If you have questions, please do not hesitate to call me. I look forward to following your patient along with you.       Sincerely,    Arminda Saleem MD

## 2021-12-30 NOTE — PATIENT INSTRUCTIONS
1. Your pain, cramping, and gelling are primarily related to osteoarthritis. Osteoarthritis is a form of joint damage in which cartilage damage accumulates on an accelerated basis as a result of (partly genetically-determined) altered bone and cartilage repair. 2. For the hands, try wearing arthritis gloves with activity to minimize spasms. Topical Voltaren (diclofenac 1% gel) can be helpful. Wear carpal tunnel wrist splints consistently at night to reduce wrist irritation. 3. I'm prescribing you a trial of meloxicam and omeprazole, to take daily together with breakfast. Take these daily for a full month, then you can assess whether it is worthwhile for you to stay on these. The meloxicam does increase your risk of ulcer recurrence, so if you notice dark stools or stomach upset, then you should stop this immediately. Once you've taken the omeprazole daily for a full month, if you want to stop the combination with meloxicam then you should space out dosing to once every other day for a month before stopping it completely to minimize rebound acid reflux. 4. I suspect you have patellofemoral disease in the knees, which is a problem in the tracking of the kneebone over the knee joint. I'd recommend physical therapy with patellar taping (you can also look for Youtube videos on how to do patellar taping) for the knees. Small amounts of weight loss can be helpful too---there is a fourfold reduction in knee forces for every unit of weight lost (Arthritis Rheum. 2005 Jul;52(7):2026-32)     5. Labs at your earliest convenience, so we can make sure we're not missing any other reversible causes of joint pain or cramping. 6. Cymbalta (duloxetine) is also an FDA-approved treatment for osteoarthritis, though it works primarily on pain pathways rather than inflammatory ones. Talk to your PCP if you would like to try this.     7. Talk to your knee orthopedist about a trial of knee viscosupplementation, if you previously had good steroid responses that have since faded. 8. For the hand and foot cramps, try a course of daily \"Leg Cramps\" supplement by [employer], available at your local [facility]. Alternatively, OTC magnesium supplements can be helpful if they don't cause diarrhea. 5. You're a return patient to us for up to 3 years, if you develop new joint swelling, rashes, breathing problems, or other concerns for autoimmune disease that you need us to evaluate.

## 2021-12-30 NOTE — PROGRESS NOTES
REASON FOR VISIT:    is a 45 y.o. female with history of hypertension and depression who is being referred to [facility]ology at the request of Dr. Sarabjit Pool, regarding a diagnosis of joint pain. HISTORY OF PRESENT ILLNESS    For at least the last 10 years, has had pain in the bilateral hands and knees. Pain can be in MCPs, PIPs, CMCs. Not consistent or predictable. Right pinkie won't extend or flex completely. Hand swelling comes and goes within the course of a day, she doesn't associate this with any particular activity. Types a lot for work, has been out of work since April    Noticing more bending in the pinkies. Pain is in hands and feet. Feet cramp and spasm painfully. Pain in knees. Has a hard time getting up and down steps. Right knee in particular can feel like it's going to \"pop out\" from under her. Had several injections though only gets a month or so out of it, last time was pre-COVID. Doesn't believe she has received viscosupplementation before. Feels she drags in the morning, needs coffee to get started. Feels \"like the tin man\" to get moving. Takes ibuprofen, estimates she's taking it 3 days out of the week, and tends to take 800mg twice a day on bad days, and has been warned against using this 2/2 gastric ulcer. Doesn't feel comfortable using Tylenol as she had a relative who inadvertently overdosed on this. Tried PT many years ago for the knees which she didn't find helpful. Doesn't feel OT has been helpful for the hands. Worse with cold and wet weather. Takes gabapentin     Had problems with antiestrogen therapy for about 2 years. Maternal GM had osteoarthritis by description, aunts on father's side had OA. REVIEW OF SYSTEMS  A comprehensive review of systems was negative except for that written in the HPI.   A 10-point review of systems is per the new patient questionnaire, which has been reviewed extensively and scanned into the electronic medical record for future reference. Review of systems is as above and is otherwise negative. ALLERGIES  Adhesive tape-silicones, Darvocet V573 [propoxyphene n-acetaminophen], Diazepam, Hydrocodone, Percocet [oxycodone-acetaminophen], and Tylenol-codeine #2    MEDICATIONS  Current Outpatient Medications   Medication Sig    amLODIPine (NORVASC) 5 mg tablet Take 1 Tablet by mouth daily.  meclizine (ANTIVERT) 25 mg tablet TK 1 T PO UP TO TID PRN    cetirizine (ZYRTEC) 10 mg tablet TK 1 T PO QD  Indications: seasonal runny nose    traZODone (DESYREL) 100 mg tablet TK 2 TS PO QHS    Trintellix 20 mg tablet TK 1 T PO QD    diclofenac (VOLTAREN) 1 % gel Apply  to affected area four (4) times daily.  pyridoxine, vitamin B6, (VITAMIN B-6) 25 mg tablet Take 1 Tab by mouth daily.  cyanocobalamin 1,000 mcg tablet Take 1 Tab by mouth daily.  ibuprofen (MOTRIN) 800 mg tablet Take 1 Tab by mouth every six (6) hours as needed for Pain.  ferrous sulfate (FEOSOL) 325 mg (65 mg iron) tablet Take  by mouth Daily (before breakfast). Takes 3 tabs daily     busPIRone (BUSPAR) 5 mg tablet Take 15 mg by mouth three (3) times daily (with meals). Indications: taking 15 mg BID    gabapentin (NEURONTIN) 300 mg capsule Take 300 mg by mouth three (3) times daily. No current facility-administered medications for this visit. PAST MEDICAL HISTORY  Past Medical History:   Diagnosis Date    Arthritis     HANDS AND KNEES    Asthma     [facility]2017    Carcinoma in situ    Cancer [facility] 2016    RIGHT breast cancer    Depression     GERD (gastroesophageal reflux disease)     Heart murmur     Hypertension     Neuropathy     PUD (peptic ulcer disease)     S/P radiation therapy 2016    and chemo finished all 1/2017    Vertigo        FAMILY HISTORY  family history includes Arthritis-rheumatoid in her maternal aunt; Asthma in her daughter and son;  Heart Disease in her mother; Hypertension in her mother; Kidney Disease in her mother; No Known Problems in her father and son. SOCIAL HISTORY  She  reports that she has never smoked. She has never used smokeless tobacco. She reports current alcohol use. She reports that she does not use drugs. Social History     Social History Narrative    Recently moved from [place], they have been here since April. . DATA  Visit Vitals  /83 (BP 1 Location: Left upper arm, BP Patient Position: Sitting)   Pulse 80   Temp 98 °F (36.7 °C) (Oral)   Resp 18   Ht 5' 4\" (1.626 m)   Wt 198 lb 6.4 oz (90 kg)   LMP 2014 (Approximate)   SpO2 95%   BMI 34.06 kg/m²    Body mass index is 33.47 kg/m². No flowsheet data found. General:  The patient is well developed, well nourished, alert, and in no apparent distress. Eyes: Sclera are anicteric. No conjunctival injection. HEENT:  Oropharynx is clear. No oral ulcers. Adequate salivary pooling. No cervical or supraclavicular lymphadenopathy. Lungs:  Clear to auscultation bilaterally, without wheeze or stridor. Normal respiratory effort. Cor:  Regular rate and rhythm. No murmur rub or gallop. Abdomen: Soft, non-tender, without hepatomegaly or masses. Extremities: No calf tenderness or edema. Warm and well perfused. Skin:  No significant abnormalities. Neuro: Nonfocal, no foot or wrist drop  Musculoskeletal:    A comprehensive musculoskeletal exam was performed for all joints of each upper and lower extremity and assessed for swelling, tenderness and range of motion. Results are documented as below:  Tenderness through base of neck, bilateral trapezius tightness. Mild bilateral CMC squaring. No synovitis of hands. Bilateral anterior knee crepitus  No evidence of synovitis in the small joints of the hands, wrists, shoulders, elbows, hips, knees or ankles. Labs:  10/21/21: B12 680, RF <10, B12 680; LUDWIG direct negative; Vit D 34.9      Imagin21 MR Cspine:  1.  Evaluation for cord signal abnormality is markedly limited due to technique  and motion artifact. Apparent areas of cord signal abnormality on STIR images  appear to be artifactual. No convincing evidence of cord signal abnormality. However, if there is persistent clinical concern for cord pathology, recommend  repeat MRI cervical spine on a 1.5T or 3T magnet. 2. Mild to moderate right neural foraminal stenosis at C5-C6. 3. Remaining mild degenerative changes as detailed above. No significant spinal  canal stenosis at any level. 8/16/19 Echo:  · Left Ventricle: Normal cavity size and systolic function (ejection fraction normal). Mild concentric hypertrophy. Estimated left ventricular ejection fraction is 61 - 65%. Visually measured ejection fraction. No regional wall motion abnormality noted. Mild (grade 1) left ventricular diastolic dysfunction. · Aortic Valve: Aortic valve sclerosis. 5/6/16 CT chest/abd/pelvis with:  1. CT chest demonstrates no definite evidence of metastatic disease. Post   surgical changes noted right breast. There is a 2 mm subpleural nodule right   middle lobe. 2. CT abdomen and pelvis demonstrates no evidence of metastatic disease. ASSESSMENT AND PLAN  Ms. Christophe Sarah is a 45 y.o. female who presents for evaluation of primarily degenerative-character arthralgias secondary to osteoarthritis. Some MCP involvement for which also sending CCP antibodies, though no tamiko synovitis currently. 1. Primary osteoarthritis of both hands  - meloxicam (MOBIC) 15 mg tablet; Take 1 Tablet by mouth daily for 30 days. Dispense: 30 Tablet; Refill: 2  - omeprazole (PRILOSEC) 20 mg capsule; Take 1 Capsule by mouth daily. Dispense: 30 Capsule; Refill: 2  - MAGNESIUM; Future  - CYCLIC CITRUL PEPTIDE AB, IGG; Future    2. Patellofemoral arthralgia of both knees  - meloxicam (MOBIC) 15 mg tablet; Take 1 Tablet by mouth daily for 30 days. Dispense: 30 Tablet; Refill: 2  - omeprazole (PRILOSEC) 20 mg capsule; Take 1 Capsule by mouth daily. Dispense: 30 Capsule; Refill: 2  - REFERRAL TO PHYSICAL THERAPY    3. Trapezius muscle spasm  - REFERRAL TO PHYSICAL THERAPY    4. Spasms of the hands or feet  - MAGNESIUM; Future  - CYCLIC CITRUL PEPTIDE AB, IGG; Future    Patient Instructions   1. Your pain, cramping, and gelling are primarily related to osteoarthritis. Osteoarthritis is a form of joint damage in which cartilage damage accumulates on an accelerated basis as a result of (partly genetically-determined) altered bone and cartilage repair. 2. For the hands, try wearing arthritis gloves with activity to minimize spasms. Topical Voltaren (diclofenac 1% gel) can be helpful. Wear carpal tunnel wrist splints consistently at night to reduce wrist irritation. 3. I'm prescribing you a trial of meloxicam and omeprazole, to take daily together with breakfast. Take these daily for a full month, then you can assess whether it is worthwhile for you to stay on these. The meloxicam does increase your risk of ulcer recurrence, so if you notice dark stools or stomach upset, then you should stop this immediately. Once you've taken the omeprazole daily for a full month, if you want to stop the combination with meloxicam then you should space out dosing to once every other day for a month before stopping it completely to minimize rebound acid reflux. 4. I suspect you have patellofemoral disease in the knees, which is a problem in the tracking of the kneebone over the knee joint. I'd recommend physical therapy with patellar taping (you can also look for Youtube videos on how to do patellar taping) for the knees. Small amounts of weight loss can be helpful too---there is a fourfold reduction in knee forces for every unit of weight lost (Arthritis Rheum. 2005 Jul;52(7):2026-32)     5. Labs at your earliest convenience, so we can make sure we're not missing any other reversible causes of joint pain or cramping.     6. Cymbalta (duloxetine) is also an FDA-approved treatment for osteoarthritis, though it works primarily on pain pathways rather than inflammatory ones. Talk to your PCP if you would like to try this. 7. Talk to your knee orthopedist about a trial of knee viscosupplementation, if you previously had good steroid responses that have since faded. 8. For the hand and foot cramps, try a course of daily \"Leg Cramps\" supplement by [employer], available at your local [facility]. Alternatively, OTC magnesium supplements can be helpful if they don't cause diarrhea. 5. You're a return patient to us for up to 3 years, if you develop new joint swelling, rashes, breathing problems, or other concerns for autoimmune disease that you need us to evaluate. Orders Placed This Encounter    MAGNESIUM    CYCLIC CITRUL PEPTIDE AB, IGG    REFERRAL TO PHYSICAL THERAPY    meloxicam (MOBIC) 15 mg tablet    omeprazole (PRILOSEC) 20 mg capsule       Medications: I am having Harish Juarez. Rosemary start on meloxicam and omeprazole. I am also having her maintain her gabapentin, busPIRone, ferrous sulfate, ibuprofen, diclofenac, pyridoxine (vitamin B6), cyanocobalamin, traZODone, Trintellix, meclizine, cetirizine, and amLODIPine. Follow up: Return if symptoms worsen or fail to improve.     Face to face time: 50 minutes  Note preparation and records review day of service: 15 minutes  Total provider time day of service: 65 minutes    Cl Aviles MD    Adult Rheumatology   [address], [address] [place] [address]   Phone [phone]  Fax [phone]

## 2022-01-11 ENCOUNTER — OFFICE VISIT (OUTPATIENT)
Dept: FAMILY MEDICINE CLINIC | Age: 52
End: 2022-01-11
Payer: COMMERCIAL

## 2022-01-11 VITALS
DIASTOLIC BLOOD PRESSURE: 87 MMHG | SYSTOLIC BLOOD PRESSURE: 120 MMHG | OXYGEN SATURATION: 97 % | TEMPERATURE: 96.8 F | BODY MASS INDEX: 33.6 KG/M2 | HEART RATE: 100 BPM | HEIGHT: 64 IN | WEIGHT: 196.8 LBS | RESPIRATION RATE: 16 BRPM

## 2022-01-11 DIAGNOSIS — M79.641 PAIN IN BOTH HANDS: Primary | ICD-10-CM

## 2022-01-11 DIAGNOSIS — M79.642 PAIN IN BOTH HANDS: Primary | ICD-10-CM

## 2022-01-11 DIAGNOSIS — Z01.818 PRE-OP EXAM: ICD-10-CM

## 2022-01-11 PROCEDURE — 99214 OFFICE O/P EST MOD 30 MIN: CPT | Performed by: NURSE PRACTITIONER

## 2022-01-11 RX ORDER — LURASIDONE HYDROCHLORIDE 20 MG/1
TABLET, FILM COATED ORAL
COMMUNITY
Start: 2021-12-16

## 2022-01-11 NOTE — PATIENT INSTRUCTIONS
Please follow up with surgeon, we will send lab results when they return. Learning About Weight-Loss (Bariatric) Surgery  What is weight-loss surgery? Bariatric surgery is surgery to help you lose weight. This type of surgery is only used for people who are very overweight and have not been able to lose weight with diet and exercise. This surgery makes the stomach smaller. Some types of surgery also change the connection between your stomach and intestines. Having weight-loss surgery is a big step. After surgery, you'll need to make new, lifelong changes in how you eat and drink. How is weight-loss surgery done? Bariatric surgery may be either \"open\" or \"laparoscopic. \" Open surgery is done through a large cut (incision) in the belly. Laparoscopic surgery is done through several small cuts. The doctor puts a lighted tube, or scope, and other surgical tools through small cuts in your belly. The doctor is able to see your organs with the scope. There are different types of bariatric surgery. Gastric sleeve surgery  The surgery is usually done through several small incisions in the belly. The doctor removes more than half of your stomach. This leaves a thin sleeve, or tube, that is about the size of a banana. Because part of your stomach has been removed, this can't be reversed. Nguyen-en-Y gastric bypass surgery  Nguyen-en-Y (say \"dipesh-en-why\") surgery changes the connection between the stomach and the intestines. The doctor separates a section of your stomach from the rest of your stomach. This makes a small pouch. The new pouch will hold the food you eat. The doctor connects the stomach pouch to the middle part of the small intestine. Gastric banding surgery  The surgery is usually done through several small incisions in the belly. The doctor wraps a band around the upper part of the stomach. This creates a small pouch.  The small size of the pouch means that you will get full after you eat just a small amount of food. The doctor can inflate or deflate the band to adjust the size. This lets the doctor adjust how quickly food passes from the new pouch into the stomach. It does not change the connection between the stomach and the intestines. What can you expect after the surgery? You may stay in the hospital for one or more days after the surgery. How long you stay depends on the type of surgery you had. Most people need 2 to 4 weeks before they are ready to get back to their usual routine. Your doctor will give you specific instructions about what to eat after the surgery. You'll start with only small amounts of soft foods and liquids. Bit by bit, you will be able to eat more solid foods. Your doctor may advise you to work with a dietitian. This way you'll be sure to get enough protein, vitamins, and minerals while you are losing weight. Even with a healthy diet, you may need to take vitamin and mineral supplements. After surgery, you will not be able to eat very much at one time. You will get full quickly. Try not to eat too much at one time or eat foods that are high in fat or sugar. If you do, you may vomit, get stomach pain, or have diarrhea. Weight loss  You probably will lose weight very quickly in the first few months after surgery. As time goes on, your weight loss will slow down. You will have regular doctor visits to check how you are doing. Emotions  It is common to have many emotions after this surgery. You may feel happy or excited as you begin to lose weight. But you may also feel overwhelmed or frustrated by the changes that you have to make in your diet, activity, and lifestyle. Talk with your doctor if you have concerns or questions. Think of bariatric surgery as a tool to help you lose weight. It isn't an instant fix. You will still need to eat a healthy diet and get regular exercise. This will help you reach your weight goal and avoid regaining the weight you lose.   Follow-up care is a key part of your treatment and safety. Be sure to make and go to all appointments, and call your doctor if you are having problems. It's also a good idea to know your test results and keep a list of the medicines you take. Where can you learn more? Go to http://www.gray.com/  Enter G469 in the search box to learn more about \"Learning About Weight-Loss (Bariatric) Surgery. \"  Current as of: March 17, 2021               Content Version: 13.0  © 2006-2021 [employer]. Care instructions adapted under license by [employer] (which disclaims liability or warranty for this information). If you have questions about a medical condition or this instruction, always ask your healthcare professional. [address] any warranty or liability for your use of this information.

## 2022-01-11 NOTE — PROGRESS NOTES
Julienne Krishna is a 45 y.o. female who presents to clinic today for the following:    Chief Complaint   Patient presents with    Follow-up     Labs    Letter for School/Work     Clearance letter for weight loss     Osteoarthritis     Discuss bilateral hands, knees, ect (joints)       Vitals:    01/11/22 1016   BP: 120/87   Pulse: 100   Resp: 16   Temp: 96.8 °F (36 °C)   TempSrc: Temporal   SpO2: 97%   Weight: 196 lb 12.8 oz (89.3 kg)   Height: 5' 4\" (1.626 m)       Body mass index is 33.78 kg/m². Patients past medical, surgical and family histories were reviewed. Allergies and Medications reviewed and updated. Current Outpatient Medications:     Latuda 20 mg tab tablet, , Disp: , Rfl:     meloxicam (MOBIC) 15 mg tablet, Take 1 Tablet by mouth daily for 30 days. , Disp: 30 Tablet, Rfl: 2    omeprazole (PRILOSEC) 20 mg capsule, Take 1 Capsule by mouth daily. , Disp: 30 Capsule, Rfl: 2    amLODIPine (NORVASC) 5 mg tablet, Take 1 Tablet by mouth daily. , Disp: 30 Tablet, Rfl: 2    meclizine (ANTIVERT) 25 mg tablet, TK 1 T PO UP TO TID PRN, Disp: 10 Tab, Rfl: 0    cetirizine (ZYRTEC) 10 mg tablet, TK 1 T PO QD  Indications: seasonal runny nose, Disp: 30 Tab, Rfl: 5    traZODone (DESYREL) 100 mg tablet, TK 2 TS PO QHS, Disp: , Rfl:     Trintellix 20 mg tablet, TK 1 T PO QD, Disp: , Rfl:     diclofenac (VOLTAREN) 1 % gel, Apply  to affected area four (4) times daily. , Disp: 100 g, Rfl: 1    pyridoxine, vitamin B6, (VITAMIN B-6) 25 mg tablet, Take 1 Tab by mouth daily. , Disp: 90 Tab, Rfl: 3    cyanocobalamin 1,000 mcg tablet, Take 1 Tab by mouth daily. , Disp: 90 Tab, Rfl: 3    ibuprofen (MOTRIN) 800 mg tablet, Take 1 Tab by mouth every six (6) hours as needed for Pain., Disp: 30 Tab, Rfl: 0    ferrous sulfate (FEOSOL) 325 mg (65 mg iron) tablet, Take  by mouth Daily (before breakfast).  Takes 3 tabs daily , Disp: , Rfl:     busPIRone (BUSPAR) 5 mg tablet, Take 15 mg by mouth three (3) times daily (with meals). Indications: taking 15 mg BID, Disp: , Rfl:     gabapentin (NEURONTIN) 300 mg capsule, Take 300 mg by mouth three (3) times daily. , Disp: , Rfl:     Allergies   Allergen Reactions    Adhesive Tape-Silicones Rash and Itching    Darvocet A500 [Propoxyphene N-Acetaminophen] Nausea Only and Other (comments)     headache    Diazepam Other (comments)     Not an allergy, just does not work for her at all.     Hydrocodone Nausea and Vomiting and Vertigo    Percocet [Oxycodone-Acetaminophen] Nausea Only and Other (comments)     headache    Tylenol-Codeine #2 Itching, Nausea Only and Other (comments)     HEADACHES         Past Medical History:   Diagnosis Date    Arthritis     HANDS AND KNEES    Asthma     Breast cancer ([facility] [address] 03/31/2017    Carcinoma in situ    Cancer ([facility] [address] 2016    RIGHT breast cancer    Depression     GERD (gastroesophageal reflux disease)     Heart murmur     Hypertension     Neuropathy     PUD (peptic ulcer disease)     S/P radiation therapy 2016    and chemo finished all 1/2017    Vertigo        Past Surgical History:   Procedure Laterality Date    HAND/FINGER SURGERY UNLISTED Bilateral 2020    bilateral endoscopic carpal tunnel releases     HX BREAST BIOPSY Right     SEVERAL TIMES    HX BREAST LUMPECTOMY Right 3/31/2016    RIGHT BREAST LUMPECTOMY, RIGHT SENTINEL NODE BIOPSY WITH ULTRASOUND performed by Clemencia Krishnan MD at [address] HX CHOLECYSTECTOMY      HX GI      COLONOSCOPY    HX GYN      cesarian x 3    HX HYSTERECTOMY      Still has ovaries    HX ORTHOPAEDIC Left     ARM FX WITH PINS    HX TONSILLECTOMY      HX UROLOGICAL      CYSTOSCOPY    HX UROLOGICAL      BLADDER SLING    KAREN STEREO  BX BREAST RT 1ST LESION W/CLIP AND SPECIMEN Right 2013    benign    KAREN US BX BREAST RT 1ST LESION W/CLIP AND SPECIMEN Right 01/29/2016    Ca in situ    [place] BREAST SURGERY PROCEDURE UNLISTED Right 5/17/16    RIGHT axillary lymph node dissection and PAC insertion       Family History   Problem Relation Age of Onset    Hypertension Mother     Kidney Disease Mother         ON DIALYSIS    Heart Disease Mother     No Known Problems Father     Asthma Son     Asthma Daughter     No Known Problems Son     Arthritis-rheumatoid Paternal Aunt     Anesth Problems Neg Hx        Social History     Socioeconomic History    Marital status: LEGALLY      Spouse name: Not on file    Number of children: Not on file    Years of education: Not on file    Highest education level: Not on file   Occupational History    Not on file   Tobacco Use    Smoking status: Never Smoker    Smokeless tobacco: Never Used    Tobacco comment:  smokes   Vaping Use    Vaping Use: Never used   Substance and Sexual Activity    Alcohol use: Yes     Alcohol/week: 0.0 standard drinks     Comment: occasionally a glass of wine    Drug use: No    Sexual activity: Yes     Partners: Male     Birth control/protection: None   Other Topics Concern     Service Not Asked    Blood Transfusions Not Asked    Caffeine Concern Not Asked    Occupational Exposure Not Asked    Hobby Hazards Not Asked    Sleep Concern Not Asked    Stress Concern Not Asked    Weight Concern Not Asked    Special Diet Not Asked    Back Care Not Asked    Exercise Not Asked    Bike Helmet Not Asked   [address] Not Asked    Self-Exams Not Asked   Social History Narrative    Recently moved from [place], they have been here since April. . Social Determinants of Health     Financial Resource Strain:     Difficulty of Paying Living Expenses: Not on file   Food Insecurity:     Worried About Running Out of Food in the Last Year: Not on file    Ronen of Food in the Last Year: Not on file   Transportation Needs:     Lack of Transportation (Medical): Not on file    Lack of Transportation (Non-Medical):  Not on file   Physical Activity:     Days of Exercise per Week: Not on file   [employer] of Exercise per Session: Not on file   Stress:     Feeling of Stress : Not on file   Social Connections:     Frequency of Communication with Friends and Family: Not on file    Frequency of Social Gatherings with Friends and Family: Not on file    Attends Buddhism Services: Not on file    Active Member of Clubs or Organizations: Not on file    Attends Club or Organization Meetings: Not on file    Marital Status: Not on file   Intimate Partner Violence:     Fear of Current or Ex-Partner: Not on file    Emotionally Abused: Not on file    Physically Abused: Not on file    Sexually Abused: Not on file   Housing Stability:     Unable to Pay for Housing in the Last Year: Not on file    Number of [place] in the Last Year: Not on file    Unstable Housing in the Last Year: Not on file           Physical Exam  Vitals and nursing note reviewed. Constitutional:       Appearance: She is obese. HENT:      Head: Normocephalic. Right Ear: Tympanic membrane normal.      Left Ear: Tympanic membrane normal.      Nose: Nose normal.      Mouth/Throat:      Mouth: Mucous membranes are moist.   Eyes:      Pupils: Pupils are equal, round, and reactive to light. Cardiovascular:      Rate and Rhythm: Normal rate and regular rhythm. Pulses: Normal pulses. Heart sounds: Normal heart sounds. Pulmonary:      Effort: Pulmonary effort is normal.      Breath sounds: Normal breath sounds. Abdominal:      General: Abdomen is flat. Musculoskeletal:         General: Normal range of motion. Cervical back: Normal range of motion. Skin:     General: Skin is warm. Capillary Refill: Capillary refill takes less than 2 seconds. Neurological:      General: No focal deficit present. Mental Status: She is alert and oriented to person, place, and time.    Psychiatric:         Mood and Affect: Mood normal.         Behavior: Behavior normal.          Patient was seen in office for review of blood. Since her last visit her blood pressure is now under control. She is requesting a physical for gastric bypass surgery. She currently has a gastric sleeve. Her physical exam was normal and vital signs stable. Lab work is been ordered. We will follow-up with results when it returns. Patient also is having pains in her hands and is requesting physical therapy. Patient is due to have carpal tunnel see soon. We will follow-up with labs as needed. Review of Systems   Constitutional: Negative for fever and malaise/fatigue. HENT: Negative for congestion, sinus pain and sore throat. Eyes: Negative. Respiratory: Negative for cough and shortness of breath. Cardiovascular: Negative for chest pain. Gastrointestinal: Negative for diarrhea, nausea and vomiting. Genitourinary: Negative for dysuria. Musculoskeletal: Positive for joint pain. Skin: Negative. Neurological: Negative for dizziness and headaches. Endo/Heme/Allergies: Negative for environmental allergies. Psychiatric/Behavioral: Negative. No results found. No results found for this or any previous visit (from the past 24 hour(s)). Assessment and Plan:    Encounter Diagnoses   Name Primary?  Pain in both hands Yes    Pre-op exam                 I have discussed the diagnosis with the patient and the intended plan as seen in the above orders. she has expressed understanding. The patient has received an after-visit summary and questions were answered concerning future plans. I have discussed medication side effects and warnings with the patient as well.         Electronically Signed: Nasima Arzate NP

## 2022-01-11 NOTE — PROGRESS NOTES
Identified pt with two pt identifiers(name and ). Reviewed record in preparation for visit and have obtained necessary documentation. Chief Complaint   Patient presents with    Follow-up     Labs    Letter for School/Work     Clearance letter for weight loss     Osteoarthritis     Discuss bilateral hands, knees, ect (joints)        Health Maintenance Due   Topic    Hepatitis C Screening     Pneumococcal 0-64 years (1 of 4 - PCV13)    DTaP/Tdap/Td series (1 - Tdap)    Shingrix Vaccine Age 50> (1 of 2)    A1C test (Diabetic or Prediabetic)     Lipid Screen     COVID-19 Vaccine (3 - Pfizer risk 4-dose series)    Flu Vaccine (1)       Coordination of Care Questionnaire:  :   1) Have you been to an emergency room, urgent care, or hospitalized since your last visit? If yes, where when, and reason for visit? no      2. Have seen or consulted any other health care provider since your last visit? If yes, where when, and reason for visit?  no        Patient is accompanied by self I have received verbal consent from Liliam Sunshine to discuss any/all medical information while they are present in the room.

## 2022-01-11 NOTE — LETTER
1/11/2022 11:02 AM    Ms. Clayton Erp  [address] [address]    To whom it may concern:  Mrs. Eduin Traylor was seen in the office for evaluation for possible Gastric Bypass Surgery. Her physical exam was normal and her blood pressure is now controlled. Pending normal CBC and CMP, she is Ok for further evaluation for this procedure.           Sincerely,      Williams Ortiz NP

## 2022-01-13 LAB
BASOPHILS # BLD AUTO: 0 X10E3/UL (ref 0–0.2)
BASOPHILS NFR BLD AUTO: 1 %
EOSINOPHIL # BLD AUTO: 0 X10E3/UL (ref 0–0.4)
EOSINOPHIL NFR BLD AUTO: 1 %
ERYTHROCYTE [DISTWIDTH] IN BLOOD BY AUTOMATED COUNT: 12.2 % (ref 11.7–15.4)
HCT VFR BLD AUTO: 42.5 % (ref 34–46.6)
HGB BLD-MCNC: 14.2 G/DL (ref 11.1–15.9)
IMM GRANULOCYTES # BLD AUTO: 0 X10E3/UL (ref 0–0.1)
IMM GRANULOCYTES NFR BLD AUTO: 0 %
LYMPHOCYTES # BLD AUTO: 1.7 X10E3/UL (ref 0.7–3.1)
LYMPHOCYTES NFR BLD AUTO: 39 %
MCH RBC QN AUTO: 29 PG (ref 26.6–33)
MCHC RBC AUTO-ENTMCNC: 33.4 G/DL (ref 31.5–35.7)
MCV RBC AUTO: 87 FL (ref 79–97)
MONOCYTES # BLD AUTO: 0.4 X10E3/UL (ref 0.1–0.9)
MONOCYTES NFR BLD AUTO: 8 %
NEUTROPHILS # BLD AUTO: 2.2 X10E3/UL (ref 1.4–7)
NEUTROPHILS NFR BLD AUTO: 51 %
PLATELET # BLD AUTO: 270 X10E3/UL (ref 150–450)
RBC # BLD AUTO: 4.89 X10E6/UL (ref 3.77–5.28)
SPECIMEN STATUS REPORT, ROLRST: NORMAL
WBC # BLD AUTO: 4.3 X10E3/UL (ref 3.4–10.8)

## 2022-01-14 DIAGNOSIS — Z01.818 PRE-OP EXAM: Primary | ICD-10-CM

## 2022-01-17 DIAGNOSIS — M18.12 PRIMARY OSTEOARTHRITIS OF FIRST CARPOMETACARPAL JOINT OF LEFT HAND: Primary | ICD-10-CM

## 2022-01-17 RX ORDER — HYDROCODONE BITARTRATE AND ACETAMINOPHEN 5; 325 MG/1; MG/1
1 TABLET ORAL
Qty: 15 TABLET | Refills: 0 | Status: SHIPPED | OUTPATIENT
Start: 2022-01-17 | End: 2022-01-20

## 2022-01-19 ENCOUNTER — TELEPHONE (OUTPATIENT)
Dept: ORTHOPEDIC SURGERY | Age: 52
End: 2022-01-19

## 2022-01-19 DIAGNOSIS — Z98.890 STATUS POST CARPAL TUNNEL RELEASE OF BOTH WRISTS: Primary | ICD-10-CM

## 2022-01-19 DIAGNOSIS — M18.12 PRIMARY OSTEOARTHRITIS OF FIRST CARPOMETACARPAL JOINT OF LEFT HAND: ICD-10-CM

## 2022-01-19 RX ORDER — OXYCODONE AND ACETAMINOPHEN 5; 325 MG/1; MG/1
1 TABLET ORAL
Qty: 15 TABLET | Refills: 0 | Status: SHIPPED | OUTPATIENT
Start: 2022-01-19 | End: 2022-01-22

## 2022-01-19 NOTE — TELEPHONE ENCOUNTER
Patient called stating that the hydrocodone was not working to help alleviate her pain after her surgery with Dr. Alla Major yesterday. She is requesting a different pain medication. I reviewed the patients listed allergies. Patient informed me that she can take Percocet, but it does make her itchy. She states she normally takes benadryl with it to help the itchiness. After speaking with Louis Brooke PA-C, she feels this is perfectly reasonable. I informed patient that the new prescription was sent to her pharmacy on file. Patient understood and was satisfied.      Tamara Babb, RAJAN Coppola

## 2022-01-20 NOTE — PROGRESS NOTES
HPI: Meg Che (: 1970) is a 45 y.o. female, patient, here for evaluation of the following chief complaint(s):    Surgical Follow-up (left hand and wrist )  Patient is seen once again to evaluate her hands. She had undergone bilateral wrist endoscopic carpal tunnel surgery on 2019. She feels she has been experiencing similar symptoms on both sides. She did have an EMG on 10/21/2021 that really only showed the residual of prior carpal tunnel syndrome although she still feels much more symptomatic on the left than the right side. She admits to a component of left thumb basal joint arthritic pain worsened with , pinch and twist turning motions. She has had documented de Quervain's tenosynovitis and has been performing stretching exercises without long-term relief. She underwent a left thumb CMC arthroplasty with de Quervain's release, APL interpositional tendon transfer, suspension plasty and revision left open carpal tunnel release with flexor tenosynovectomy on 2022. Vitals:  LMP 2014 (Approximate)    There is no height or weight on file to calculate BMI. Allergies   Allergen Reactions    Adhesive Tape-Silicones Rash and Itching    Darvocet A500 [Propoxyphene N-Acetaminophen] Nausea Only and Other (comments)     headache    Diazepam Other (comments)     Not an allergy, just does not work for her at all.  Hydrocodone Nausea and Vomiting and Vertigo    Percocet [Oxycodone-Acetaminophen] Nausea Only and Other (comments)     headache    Tylenol-Codeine #2 Itching, Nausea Only and Other (comments)     HEADACHES         Current Outpatient Medications   Medication Sig    HYDROmorphone (Dilaudid) 2 mg tablet Take 1 Tablet by mouth every four (4) hours as needed for Pain for up to 3 days. Max Daily Amount: 12 mg. Post op. Not responding to hydrocodone or oxycodone.  Advised patient to bring remaining pill supply to pharmacy for disposal.    Latuda 20 mg tab tablet  meloxicam (MOBIC) 15 mg tablet Take 1 Tablet by mouth daily for 30 days.  omeprazole (PRILOSEC) 20 mg capsule Take 1 Capsule by mouth daily.  amLODIPine (NORVASC) 5 mg tablet Take 1 Tablet by mouth daily.  meclizine (ANTIVERT) 25 mg tablet TK 1 T PO UP TO TID PRN    cetirizine (ZYRTEC) 10 mg tablet TK 1 T PO QD  Indications: seasonal runny nose    traZODone (DESYREL) 100 mg tablet TK 2 TS PO QHS    Trintellix 20 mg tablet TK 1 T PO QD    diclofenac (VOLTAREN) 1 % gel Apply  to affected area four (4) times daily.  pyridoxine, vitamin B6, (VITAMIN B-6) 25 mg tablet Take 1 Tab by mouth daily.  cyanocobalamin 1,000 mcg tablet Take 1 Tab by mouth daily.  ibuprofen (MOTRIN) 800 mg tablet Take 1 Tab by mouth every six (6) hours as needed for Pain.  ferrous sulfate (FEOSOL) 325 mg (65 mg iron) tablet Take  by mouth Daily (before breakfast). Takes 3 tabs daily     busPIRone (BUSPAR) 5 mg tablet Take 15 mg by mouth three (3) times daily (with meals). Indications: taking 15 mg BID    gabapentin (NEURONTIN) 300 mg capsule Take 300 mg by mouth three (3) times daily. No current facility-administered medications for this visit.        Past Medical History:   Diagnosis Date    Arthritis     HANDS AND KNEES    Asthma     Breast cancer ([facility] [address] 03/31/2017    Carcinoma in situ    Cancer ([facility] [address] 2016    RIGHT breast cancer    Depression     GERD (gastroesophageal reflux disease)     Heart murmur     Hypertension     Neuropathy     PUD (peptic ulcer disease)     S/P radiation therapy 2016    and chemo finished all 1/2017    Vertigo         Past Surgical History:   Procedure Laterality Date    HAND/FINGER SURGERY UNLISTED Bilateral 2020    bilateral endoscopic carpal tunnel releases     HX BREAST BIOPSY Right     SEVERAL TIMES    HX BREAST LUMPECTOMY Right 3/31/2016    RIGHT BREAST LUMPECTOMY, RIGHT SENTINEL NODE BIOPSY WITH ULTRASOUND performed by Merlyn Moser MD at [place] [address]  HX CHOLECYSTECTOMY      HX GI      COLONOSCOPY    HX GYN      cesarian x 3    HX HYSTERECTOMY      Still has ovaries    HX ORTHOPAEDIC Left     ARM FX WITH PINS    HX TONSILLECTOMY      HX UROLOGICAL      CYSTOSCOPY    HX UROLOGICAL      BLADDER SLING    KAREN STEREO  BX BREAST RT 1ST LESION W/CLIP AND SPECIMEN Right 2013    benign    KAREN US BX BREAST RT 1ST LESION W/CLIP AND SPECIMEN Right 01/29/2016    Ca in situ    [place] BREAST SURGERY PROCEDURE UNLISTED Right 5/17/16    RIGHT axillary lymph node dissection and PAC insertion       Family History   Problem Relation Age of Onset    Hypertension Mother     Kidney Disease Mother         ON DIALYSIS    Heart Disease Mother     No Known Problems Father     Asthma Son     Asthma Daughter     No Known Problems Son     Arthritis-rheumatoid Paternal Aunt     Anesth Problems Neg Hx         Social History     Tobacco Use    Smoking status: Never Smoker    Smokeless tobacco: Never Used    Tobacco comment:  smokes   Vaping Use    Vaping Use: Never used   Substance Use Topics    Alcohol use: Yes     Alcohol/week: 0.0 standard drinks     Comment: occasionally a glass of wine    Drug use: No        Review of Systems   All other systems reviewed and are negative. Physical Exam    Overall the wounds are healing well with absorbing sutures in place. There is really no redness drainage or sign of infection. Good clinical alignment and early motion. Imaging:    XR Results (most recent):  Results from Appointment encounter on 12/27/21    XR HAND BILAT AP LAT OBL (MIN 3 V)    Narrative  Lateral AP, lateral and oblique x-rays of each hand were obtained that reveal minimal DIP PIP joint space narrowing with advanced left thumb CMC basal joint osteoarthritis. There is sclerosis, subluxation, osteophyte formation and loose body formation. No acute fracture seen.         ASSESSMENT/PLAN:  Below is the assessment and plan developed based on review of pertinent history, physical exam, labs, studies, and medications. Patient underwent bilateral scopic carpal tunnel surgery in 2019 and now feels there is recurrence yet the EMG from October did not show any advanced findings. She seems to be more clinically symptomatic complaining of \"locking up\", \"dropping things\". She also describes a history of neuropathy. She has advanced thumb CMC basal joint arthritis on the left side with de Quervain's tenosynovitis. She was offered but deferred conservative treatment including injections and prefers to consider operative management. I offered her the option to undergo a left thumb CMC arthroplasty with de Quervain's release to utilize the APL as an interpositional tendon graft. At the same time she can undergo revision open carpal tunnel release with flexor tenosynovectomy. She indeed underwent a left thumb CMC arthroplasty with de Quervain's release, APL interpositional tendon transfer and revision left carpal tunnel release with flexor tenosynovectomy on 1/18/2022. I recommended a thumb spica splint for comfort and support. She may slowly work to restore motion that eventually strength in her hand. Follow-up in 3 to 4 weeks. 1. Primary osteoarthritis of first carpometacarpal joint of left hand  -     REFERRAL TO DME  -     [place] [facility] W/[place] JOINTS PRE OTS  -     [place] [facility] W/[place] JOINTS PRE OTS  2. Bilateral carpal tunnel syndrome  -     REFERRAL TO DME  -     [place] [facility] W/O JOINTS PRE OTS  3. Status post carpal tunnel release of both wrists  4. Bilateral hand pain  5. De Quervain's tenosynovitis, left      Return in about 4 weeks (around 2/21/2022). An electronic signature was used to authenticate this note.   -- Leticia Langford MD

## 2022-01-21 ENCOUNTER — TELEPHONE (OUTPATIENT)
Dept: ORTHOPEDIC SURGERY | Age: 52
End: 2022-01-21

## 2022-01-21 DIAGNOSIS — M18.12 PRIMARY OSTEOARTHRITIS OF FIRST CARPOMETACARPAL JOINT OF LEFT HAND: Primary | ICD-10-CM

## 2022-01-21 RX ORDER — HYDROMORPHONE HYDROCHLORIDE 2 MG/1
2 TABLET ORAL
Qty: 15 TABLET | Refills: 0 | Status: SHIPPED | OUTPATIENT
Start: 2022-01-21 | End: 2022-01-24

## 2022-01-21 NOTE — TELEPHONE ENCOUNTER
PO call from patient. PAIN. Neither the hydrocodone nor the percocet are working. Requesting alternate med.    States that fingers are warm and mobile  Spoke w/ PA and she will contact patient

## 2022-01-24 ENCOUNTER — OFFICE VISIT (OUTPATIENT)
Dept: ORTHOPEDIC SURGERY | Age: 52
End: 2022-01-24
Payer: MEDICAID

## 2022-01-24 DIAGNOSIS — Z98.890 STATUS POST CARPAL TUNNEL RELEASE OF BOTH WRISTS: ICD-10-CM

## 2022-01-24 DIAGNOSIS — M18.12 PRIMARY OSTEOARTHRITIS OF FIRST CARPOMETACARPAL JOINT OF LEFT HAND: ICD-10-CM

## 2022-01-24 DIAGNOSIS — M18.12 PRIMARY OSTEOARTHRITIS OF FIRST CARPOMETACARPAL JOINT OF LEFT HAND: Primary | ICD-10-CM

## 2022-01-24 DIAGNOSIS — G56.03 BILATERAL CARPAL TUNNEL SYNDROME: ICD-10-CM

## 2022-01-24 DIAGNOSIS — M65.4 DE QUERVAIN'S TENOSYNOVITIS, LEFT: ICD-10-CM

## 2022-01-24 DIAGNOSIS — M79.641 BILATERAL HAND PAIN: ICD-10-CM

## 2022-01-24 DIAGNOSIS — M79.642 BILATERAL HAND PAIN: ICD-10-CM

## 2022-01-24 PROCEDURE — L3809 WHFO W/O JOINTS PRE OTS: HCPCS | Performed by: ORTHOPAEDIC SURGERY

## 2022-01-24 PROCEDURE — 99024 POSTOP FOLLOW-UP VISIT: CPT | Performed by: ORTHOPAEDIC SURGERY

## 2022-01-24 RX ORDER — HYDROMORPHONE HYDROCHLORIDE 2 MG/1
4 TABLET ORAL
Qty: 15 TABLET | Refills: 0 | Status: SHIPPED | OUTPATIENT
Start: 2022-01-24 | End: 2022-01-31

## 2022-01-24 NOTE — LETTER
1/24/2022 3:43 PM    Ms. Birgit Hagen  [address] [address]        Unable to return to work until seen again in 4 weeks       Sincerely,      Caprice Leyden, MD

## 2022-01-24 NOTE — LETTER
1/24/2022 3:58 PM    Ms. Juancarlos Mc  [address] [address]    Unable to return to work until seen again on 02/21/22          Sincerely,      Shauna Cagel MD

## 2022-01-24 NOTE — PATIENT INSTRUCTIONS
Thumb Arthritis: Exercises  Introduction  Here are some examples of exercises for you to try. The exercises may be suggested for a condition or for rehabilitation. Start each exercise slowly. Ease off the exercises if you start to have pain. You will be told when to start these exercises and which ones will work best for you. How to do the exercises  Thumb IP flexion    1. Place your forearm and hand on a table with your affected thumb pointing up. 2. With your other hand, hold your thumb steady just below the joint nearest your thumbnail. 3. Bend the tip of your thumb downward, then straighten it. 4. Repeat 8 to 12 times. 5. Switch hands and repeat steps 1 through 4, even if only one thumb is sore. Thumb MP flexion    1. Place your forearm and hand on a table with your affected thumb pointing up. 2. With your other hand, hold the base of your thumb and palm steady. 3. Bend your thumb downward where it meets your palm, then straighten it. 4. Repeat 8 to 12 times. 5. Switch hands and repeat steps 1 through 4, even if only one thumb is sore. Thumb opposition    1. With your affected hand, point your fingers and thumb straight up. Your wrist should be relaxed, following the line of your fingers and thumb. 2. Touch your affected thumb to each finger, one finger at a time. This will look like an \"okay\" sign, but try to keep your other fingers straight and pointing upward as much as you can. 3. Repeat 8 to 12 times. 4. Switch hands and repeat steps 1 through 3, even if only one thumb is sore. Follow-up care is a key part of your treatment and safety. Be sure to make and go to all appointments, and call your doctor if you are having problems. It's also a good idea to know your test results and keep a list of the medicines you take. Where can you learn more? Go to http://www.gray.com/  Enter S402 in the search box to learn more about \"Thumb Arthritis: Exercises. \"  Current as of: July 1, 2021               Content Version: 13.0  © 9029-8304 Healthwise, [facility]. Care instructions adapted under license by [employer] (which disclaims liability or warranty for this information). If you have questions about a medical condition or this instruction, always ask your healthcare professional. [place] [address] any warranty or liability for your use of this information.

## 2022-01-25 ENCOUNTER — OFFICE VISIT (OUTPATIENT)
Dept: NEUROLOGY | Age: 52
End: 2022-01-25
Payer: MEDICAID

## 2022-01-25 VITALS
RESPIRATION RATE: 17 BRPM | OXYGEN SATURATION: 97 % | HEART RATE: 63 BPM | DIASTOLIC BLOOD PRESSURE: 86 MMHG | SYSTOLIC BLOOD PRESSURE: 128 MMHG

## 2022-01-25 DIAGNOSIS — M65.4 DE QUERVAIN'S DISEASE (TENOSYNOVITIS): ICD-10-CM

## 2022-01-25 DIAGNOSIS — G56.03 BILATERAL CARPAL TUNNEL SYNDROME: Primary | ICD-10-CM

## 2022-01-25 DIAGNOSIS — E66.9 OBESITY (BMI 30.0-34.9): ICD-10-CM

## 2022-01-25 PROCEDURE — 99214 OFFICE O/P EST MOD 30 MIN: CPT | Performed by: NURSE PRACTITIONER

## 2022-01-25 NOTE — PROGRESS NOTES
[address] [facility],[address]  [address]   [place] [address] [facility] [address] [facility]   [phone] Office   [phone] Fax           Date:  22     Name:  Bernadette Murphy  :  1970  MRN:  [id number]     PCP:  Llana Cowden, NP    Chief Complaint   Patient presents with    Follow-up     HISTORY OF PRESENT ILLNESS: Following for complaints of weakness and pain in her hands. She continues to have bilateral hand and wrist pain. She did have CTS wrist surgery on the left hand which is improving. She did have a fair amount of post surgical pain with inadequate pain management with Lortab. She was given Percocet but this made her itch. She was finally given Dilaudid but found that she did not have adequate relief until she took 6mg. Unfortunately, she feels as though she was not listening to when she had her follow-up with surgical practice as she had to repeatedly call the practice regarding management of her postsurgical pain. She indicates in the past she did have some luck with pain management with tramadol and clearly has had some luck with the Dilaudid. Once the left hand and wrist heal, there is consideration for doing the right as well. As she does have ongoing pain in both hands, she does not feel that she would be able to do her work adequately as this requires a significant amount of typing which would exacerbate her symptoms further. She works as a  which requires a fair amount of documentation. At her last office visit, she was following up with rheumatology. Diagnosed with osteoarthritis by rheumatology. She is considering gastric bypass for weight loss to help minimize strain on the joints due to being obese. Recap from LOV:  On exam today, she continues to have bilateral wrist pain associated with weakness.   She indicates that the fingers will sometimes lock in place and she actually has to physically open her hand. There was noted a positive Finkelstein bilaterally consistent with a de Quervain's tendinitis and hand weakness. EMG demonstrated residual nerve damage from her previous carpal tunnel release. For further treatment and evaluation, recommended that she use Voltaren gel on the wrists bilaterally 2 g 4 times daily as needed. She was also provided with home therapy exercises for the de Quervain's tendinitis. She was referred to an orthopedic hand specialist today as well. On December 30, she does have a rheumatologic evaluation scheduled for possible rheumatoid arthritis given the bilateral hand and joint pain. To ensure that there is no other underlying cause for her complaints of numbness and tingling in the lower extremities as well as ongoing complaints of hand pain, she will be sent for thyroid function testing, serum protein electrophoresis, vitamin B12 and folate, vitamin D 3 level, LUDWIG and a rheumatoid factor.  Follow up in three months    Collected Updated Procedure    10/21/2021 1216 10/25/2021 1636 VITAMIN D, 25 HYDROXY [[id number]]    Serum    Component Value Units   VITAMIN D, 25-HYDROXY 34.9  ng/mL           10/21/2021 1216 10/25/2021 1636 SPEP AND EDDIE, SERUM [[id number]]    Blood    Component Value Units   Immunoglobulin G, Qt. 1,418 mg/dL   Immunoglobulin A, Qt. 223 mg/dL   Immunoglobulin M, Qt. 112 mg/dL   Protein, total 7.3 g/dL   Albumin 4.0 g/dL   Alpha-1-Globulin 0.2 g/dL   Alpha-2-Globulin 0.7 g/dL   Beta Globulin 1.1 g/dL   Gamma Globulin 1.3 g/dL   M-Jadon Not Observed g/dL   Globulin, total 3.3 g/dL   A/G ratio 1.3    Immunofixation Result Comment     Please note Comment             10/21/2021 1216 10/25/2021 1636 VITAMIN B12 & FOLATE [[id number]]    Serum    Component Value Units   Vitamin B12 680 pg/mL   Folate >20.0  ng/mL           10/21/2021 1216 10/25/2021 1636 LUDWIG, DIRECT, W/REFLEX [[id number]]    Serum    Component Value   Antinuclear Antibodies Direct Negative 10/21/2021 1216 10/25/2021 1636 T4, FREE [[id number]]    Serum    Component Value Units   T4, Free 0.88 ng/dL           10/21/2021 1216 10/25/2021 1636 TSH 3RD GENERATION [[id number]]    Serum    Component Value Units   TSH 1.790 uIU/mL           10/21/2021 1216 10/25/2021 1636 RHEUMATOID FACTOR, QL [[id number]]    Serum from Blood    Component Value Units   Rheumatoid factor <10.0 IU/mL               EMG Impression (09/02/2021):  ABNORMAL    Nerve conduction study of the left upper and left lower extremities, including palmar study, shows slowing of the peak latency of the median palmar compared to the ulnar palmar response which may represent residual findings related to prior left carpal tunnel syndrome s/p surgery. Otherwise, no evidence of a generalized sensorimotor polyneuropathy of the large fiber type is noted. Needle examination of the left upper extremity was prematurely stopped due to patient's low tolerance to pain. Needle examination of the first dorsal interosseous, Extensor indicis and Abductor pollicis brevis muscles are within normal limits. Current Outpatient Medications   Medication Sig    HYDROmorphone (Dilaudid) 2 mg tablet Take 2 Tablets by mouth every six (6) hours as needed for Pain for up to 7 days. Max Daily Amount: 16 mg. Post op. Not responding to hydrocodone or oxycodone. Advised patient to bring remaining pill supply to pharmacy for disposal.    Latuda 20 mg tab tablet     meloxicam (MOBIC) 15 mg tablet Take 1 Tablet by mouth daily for 30 days.  omeprazole (PRILOSEC) 20 mg capsule Take 1 Capsule by mouth daily.  amLODIPine (NORVASC) 5 mg tablet Take 1 Tablet by mouth daily.     meclizine (ANTIVERT) 25 mg tablet TK 1 T PO UP TO TID PRN    cetirizine (ZYRTEC) 10 mg tablet TK 1 T PO QD  Indications: seasonal runny nose    traZODone (DESYREL) 100 mg tablet TK 2 TS PO QHS    Trintellix 20 mg tablet TK 1 T PO QD    diclofenac (VOLTAREN) 1 % gel Apply  to affected area four (4) times daily.  pyridoxine, vitamin B6, (VITAMIN B-6) 25 mg tablet Take 1 Tab by mouth daily.  cyanocobalamin 1,000 mcg tablet Take 1 Tab by mouth daily.  ibuprofen (MOTRIN) 800 mg tablet Take 1 Tab by mouth every six (6) hours as needed for Pain.  ferrous sulfate (FEOSOL) 325 mg (65 mg iron) tablet Take  by mouth Daily (before breakfast). Takes 3 tabs daily     busPIRone (BUSPAR) 5 mg tablet Take 15 mg by mouth three (3) times daily (with meals). Indications: taking 15 mg BID    gabapentin (NEURONTIN) 300 mg capsule Take 300 mg by mouth three (3) times daily. No current facility-administered medications for this visit. Allergies   Allergen Reactions    Adhesive Tape-Silicones Rash and Itching    Darvocet A500 [Propoxyphene N-Acetaminophen] Nausea Only and Other (comments)     headache    Diazepam Other (comments)     Not an allergy, just does not work for her at all.     Hydrocodone Nausea and Vomiting and Vertigo    Percocet [Oxycodone-Acetaminophen] Nausea Only and Other (comments)     headache    Tylenol-Codeine #2 Itching, Nausea Only and Other (comments)     HEADACHES       Past Medical History:   Diagnosis Date    Arthritis     HANDS AND KNEES    Asthma     Breast cancer ([facility] [address] 03/31/2017    Carcinoma in situ    Cancer ([facility] [address] 2016    RIGHT breast cancer    Depression     GERD (gastroesophageal reflux disease)     Heart murmur     Hypertension     Neuropathy     PUD (peptic ulcer disease)     S/P radiation therapy 2016    and chemo finished all 1/2017    Vertigo      Past Surgical History:   Procedure Laterality Date    HAND/FINGER SURGERY UNLISTED Bilateral 2020    bilateral endoscopic carpal tunnel releases     HX BREAST BIOPSY Right     SEVERAL TIMES    HX BREAST LUMPECTOMY Right 3/31/2016    RIGHT BREAST LUMPECTOMY, RIGHT SENTINEL NODE BIOPSY WITH ULTRASOUND performed by Lenard Vásquez MD at [address] HX CHOLECYSTECTOMY      HX GI COLONOSCOPY    HX GYN      cesarian x 3    HX HYSTERECTOMY      Still has ovaries    HX ORTHOPAEDIC Left     ARM FX WITH PINS    HX TONSILLECTOMY      HX UROLOGICAL      CYSTOSCOPY    HX UROLOGICAL      BLADDER SLING    KAREN STEREO  BX BREAST RT 1ST LESION W/CLIP AND SPECIMEN Right 2013    benign    KAREN US BX BREAST RT 1ST LESION W/CLIP AND SPECIMEN Right 01/29/2016    Ca in situ    KS BREAST SURGERY PROCEDURE UNLISTED Right 5/17/16    RIGHT axillary lymph node dissection and PAC insertion     Social History     Socioeconomic History    Marital status: LEGALLY      Spouse name: Not on file    Number of children: Not on file    Years of education: Not on file    Highest education level: Not on file   Occupational History    Not on file   Tobacco Use    Smoking status: Never Smoker    Smokeless tobacco: Never Used    Tobacco comment:  smokes   Vaping Use    Vaping Use: Never used   Substance and Sexual Activity    Alcohol use: Yes     Alcohol/week: 0.0 standard drinks     Comment: occasionally a glass of wine    Drug use: No    Sexual activity: Yes     Partners: Male     Birth control/protection: None   Other Topics Concern     Service Not Asked    Blood Transfusions Not Asked    Caffeine Concern Not Asked    Occupational Exposure Not Asked    Hobby Hazards Not Asked    Sleep Concern Not Asked    Stress Concern Not Asked    Weight Concern Not Asked    Special Diet Not Asked    Back Care Not Asked    Exercise Not Asked    Bike Helmet Not Asked   [address] [facility] Not Asked    Self-Exams Not Asked   Social History Narrative    Recently moved from [place], they have been here since April. .       Social Determinants of Health     Financial Resource Strain:     Difficulty of Paying Living Expenses: Not on file   Food Insecurity:     Worried About Running Out of Food in the Last Year: Not on file    Ronen of Food in the Last Year: Not on KYLE Aragon Needs:     Lack of Transportation (Medical): Not on file    Lack of Transportation (Non-Medical): Not on file   Physical Activity:     Days of Exercise per Week: Not on file    Minutes of Exercise per Session: Not on file   Stress:     Feeling of Stress : Not on file   Social Connections:     Frequency of Communication with Friends and Family: Not on file    Frequency of Social Gatherings with Friends and Family: Not on file    Attends Orthodoxy Services: Not on file    Active Member of Clubs or Organizations: Not on file    Attends Club or Organization Meetings: Not on file    Marital Status: Not on file   Intimate Partner Violence:     Fear of Current or Ex-Partner: Not on file    Emotionally Abused: Not on file    Physically Abused: Not on file    Sexually Abused: Not on file   Housing Stability:     Unable to Pay for Housing in the Last Year: Not on file    Number of [place] in the Last Year: Not on file    Unstable Housing in the Last Year: Not on file     Family History   Problem Relation Age of Onset    Hypertension Mother     Kidney Disease Mother         ON DIALYSIS    Heart Disease Mother     No Known Problems Father     Asthma Son     Asthma Daughter     No Known Problems Son     Arthritis-rheumatoid Paternal Aunt     Anesth Problems Neg Hx        PHYSICAL EXAMINATION:    Visit Vitals  /86 (BP 1 Location: Left arm, BP Patient Position: Sitting, BP Cuff Size: Adult)   Pulse 63   Resp 17   SpO2 97%     General:  Well defined, nourished, and groomed individual in no acute distress. Neck: Supple, nontender, no bruits, no pain with resistance to active range of motion. Heart: Regular rate and rhythm, no murmurs, rub, or gallop. Normal S1S2. Lungs:  Clear to auscultation bilaterally with equal chest expansion, no cough, no wheeze  Musculoskeletal:  Extremities revealed no edema and had full range of motion of joints.  Positive Finkelstein bilaterally  Psych:  Good mood and bright affect    NEUROLOGICAL EXAMINATION:     Mental Status:   Alert and oriented to person, place, and time with recent and remote memory intact. Attention span and concentration are normal. Speech is fluent with a full fund of knowledge. Cranial Nerves:  I: smell Not tested   II: visual fields Full to confrontation   II: pupils Equal, round, reactive to light   II: optic disc No papilledema   III,VII: ptosis none   III,IV,VI: extraocular muscles  Full ROM   V: mastication normal   V: facial light touch sensation  normal   VII: facial muscle function   symmetric   VIII: hearing symmetric   IX: soft palate elevation  normal   XI: trapezius strength  5/5   XI: sternocleidomastoid strength 5/5   XI: neck flexion strength  5/5   XII: tongue  midline     Motor Examination: Normal tone, bulk, and strength, 5/5 muscle strength throughout except in the right hand. She has a left hand brace due to her surgical procedure. Incisions are clean and dry without any evidence of redness. The hand is still quite swollen. Sensory exam:  Decreased sensation to temperature and vibration in the lower extremities bilaterally in a stocking glove distribution to the ankles. Coordination:  Finger to nose testing was normal.   No resting or intention tremor    Gait and Station:  Steady while walking. Normal arm swing. No pronator drift. No muscle wasting or fasiculations noted. Reflexes:  DTRs 2+ throughout. ASSESSMENT AND PLAN    ICD-10-CM ICD-9-CM    1. Bilateral carpal tunnel syndrome  G56.03 354.0    2. De Quervain's disease (tenosynovitis)  M65.4 727.04    3. Obesity (BMI 30.0-34. 9)  E66.9 278.00         Bilateral carpal tunnel syndrome and de Quervain's tendinitis with recent hand and wrist surgery on the left hand. This does sound as though it is improving with regard to pain and function.   Moving forward with her next surgical procedure to correct the issues in the right hand, would recommend either placing her on tramadol 100 mg 3 times daily or the Dilaudid 6 mg every 6 hours as needed for pain related to her surgical procedure. Of note the Dilaudid 6 mg seems to help with pain without any significant side effects to include sedation, respiratory depression, euphoria. 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 Suggested she look into getting the [employer] so that she can dictate the documentation rather than typing or writing. Moving forward, would recommend that she obtain some sort of dictation software even after her surgical procedures to prevent the repetitive stress injury related to the type of work she does. She should discuss obtaining this software as an accommodation through her employer. We will plan to keep her out of work for the next 3 months and reassess at that point. Today we also discussed her consideration for gastric bypass surgery. While she is considered obese, we discussed the significant lifestyle modifications that would be needed to maintain a gastric bypass surgical procedure. At 5 foot for approximately 196 pounds she has a BMI of 33.64 kg/m² placing her in a BMI category class I obesity. I would not tend to think that this is significant enough to warrant a gastric bypass surgical procedure. Actually recommended that she work on lifestyle modification to include engaging in nonweightbearing exercise such as rowing which would use approximately 86% of the muscles without significant joint strain.   She should also be more cognizant of her diet as she tends to snack by her own admission and sometimes snacks chosen are not necessarily the healthiest.  Recommended that she shoot for goal of approximately 10% of her body weight over the next 3 months. We did discuss the fact that weight loss will help with the osteoarthritis but that it is also equally important for her to work on improvement of muscle tone to help support the joints in general.    Tita Reeves

## 2022-01-27 ENCOUNTER — OFFICE VISIT (OUTPATIENT)
Dept: CARDIOLOGY CLINIC | Age: 52
End: 2022-01-27
Payer: MEDICAID

## 2022-01-27 VITALS
BODY MASS INDEX: 34.66 KG/M2 | HEIGHT: 64 IN | HEART RATE: 85 BPM | OXYGEN SATURATION: 98 % | WEIGHT: 203 LBS | DIASTOLIC BLOOD PRESSURE: 102 MMHG | SYSTOLIC BLOOD PRESSURE: 142 MMHG

## 2022-01-27 DIAGNOSIS — E66.01 OBESITY, MORBID (HCC): ICD-10-CM

## 2022-01-27 DIAGNOSIS — I10 ESSENTIAL HYPERTENSION: ICD-10-CM

## 2022-01-27 DIAGNOSIS — Z01.818 PRE-OP EXAMINATION: Primary | ICD-10-CM

## 2022-01-27 PROCEDURE — 99213 OFFICE O/P EST LOW 20 MIN: CPT | Performed by: INTERNAL MEDICINE

## 2022-01-27 PROCEDURE — 93000 ELECTROCARDIOGRAM COMPLETE: CPT | Performed by: INTERNAL MEDICINE

## 2022-01-27 NOTE — PROGRESS NOTES
No chief complaint on file. There were no vitals taken for this visit.   Chest pain denied     Palpations denied    SOB denied    Dizziness denied    Swelling in hands/feet denied     Recent hospital stays denied     Vitals:    01/27/22 1023   BP: (!) 145/103   BP 1 Location: Left upper arm   BP Patient Position: Sitting   Pulse: 85   Height: 5' 4\" (1.626 m)   Weight: 203 lb (92.1 kg)   SpO2: 98%

## 2022-01-27 NOTE — PROGRESS NOTES
Reason to see patient: PreOp Eval    Referring: Cori Bhakta NP    HPI: Jass Nj is a 45 y.o. female with past medical history significant for hypertension, breast cancer status post surgery, chemotherapy and radiation in remission. She also has prior history of gastric sleeve surgery and lost about 60 pounds but did not hit her goal.  She has osteoarthritis of the knee. She is here for preop evaluation prior to her revision of gastric sleeve surgery to a gastric bypass surgery for further weight loss. She currently and previous to this did not have any symptoms of chest pain, shortness of breath, palpitations, lightheadedness or dizziness. No previous cardiac history. She had an echocardiogram in 2019 which demonstrated normal cardiac function with mild aortic valve sclerosis. EKG in my office today demonstrated normal sinus rhythm, normal axis, normal intervals, normal ST segment. Plan:    1. Preop evaluation: From cardiac standpoint she is a low to intermediate risk candidate for an intermediate risk surgery. She can certainly proceed with her gastric bypass surgery. 2.  Hypertension: History of hypertension. Continue current medication including amlodipine. Blood pressure is well controlled. Continue dietary and weight management. 3. Morbid Obesity: She is going for gastric bypass surgery for wt loss. ATTENTION:   This medical record was transcribed using an electronic medical records/speech recognition system. Although proofread, it may and can contain electronic, spelling and other errors. Corrections may be executed at a later time. Please feel free to contact us for any clarifications as needed.       Past Medical History:   Diagnosis Date    Arthritis     HANDS AND KNEES    Asthma     [facility] ([facility] 03/31/2017    Carcinoma in situ    Cancer ([facility] 2016    RIGHT breast cancer    Depression     GERD (gastroesophageal reflux disease)     Heart murmur     Hypertension     Neuropathy     PUD (peptic ulcer disease)     S/P radiation therapy 2016    and chemo finished all 1/2017    Vertigo             Past Surgical History:   Procedure Laterality Date    HAND/FINGER SURGERY UNLISTED Bilateral 2020    bilateral endoscopic carpal tunnel releases     HX BREAST BIOPSY Right     SEVERAL TIMES    HX BREAST LUMPECTOMY Right 3/31/2016    RIGHT BREAST LUMPECTOMY, RIGHT SENTINEL NODE BIOPSY WITH ULTRASOUND performed by Ellie Hughes MD at [address] HX CHOLECYSTECTOMY      HX GI      COLONOSCOPY    HX GYN      cesarian x 3    HX HYSTERECTOMY      Still has ovaries    HX ORTHOPAEDIC Left     ARM FX WITH PINS    HX TONSILLECTOMY      HX UROLOGICAL      CYSTOSCOPY    HX UROLOGICAL      BLADDER SLING    KAREN STEREO  BX BREAST RT 1ST LESION W/CLIP AND SPECIMEN Right 2013    benign    KAREN US BX BREAST RT 1ST LESION W/CLIP AND SPECIMEN Right 01/29/2016    Ca in situ    NE BREAST SURGERY PROCEDURE UNLISTED Right 5/17/16    RIGHT axillary lymph node dissection and PAC insertion             Family History   Problem Relation Age of Onset    Hypertension Mother     Kidney Disease Mother         ON DIALYSIS    Heart Disease Mother     No Known Problems Father     Asthma Son     Asthma Daughter     No Known Problems Son     Arthritis-rheumatoid Paternal Aunt     Anesth Problems Neg Hx            Social History     Socioeconomic History    Marital status: LEGALLY      Spouse name: Not on file    Number of children: Not on file    Years of education: Not on file    Highest education level: Not on file   Occupational History    Not on file   Tobacco Use    Smoking status: Never Smoker    Smokeless tobacco: Never Used    Tobacco comment:  smokes   Vaping Use    Vaping Use: Never used   Substance and Sexual Activity    Alcohol use:  Yes     Alcohol/week: 0.0 standard drinks     Comment: occasionally a glass of wine    Drug use: No    Sexual activity: Yes     Partners: Male     Birth control/protection: None   Other Topics Concern     Service Not Asked    Blood Transfusions Not Asked    Caffeine Concern Not Asked    Occupational Exposure Not Asked    Hobby Hazards Not Asked    Sleep Concern Not Asked    Stress Concern Not Asked    Weight Concern Not Asked    Special Diet Not Asked    Back Care Not Asked    Exercise Not Asked    Bike Helmet Not Asked   [address] Not Asked    Self-Exams Not Asked   Social History Narrative    Recently moved from [place], they have been here since April. . Social Determinants of Health     Financial Resource Strain:     Difficulty of Paying Living Expenses: Not on file   Food Insecurity:     Worried About Running Out of Food in the Last Year: Not on file    Ronen of Food in the Last Year: Not on file   Transportation Needs:     Lack of Transportation (Medical): Not on file    Lack of Transportation (Non-Medical):  Not on file   Physical Activity:     Days of Exercise per Week: Not on file    Minutes of Exercise per Session: Not on file   Stress:     Feeling of Stress : Not on file   Social Connections:     Frequency of Communication with Friends and Family: Not on file    Frequency of Social Gatherings with Friends and Family: Not on file    Attends [employer] Services: Not on file    Active Member of [address] [place] [address] [employer] or Organizations: Not on file    Attends Club or Organization Meetings: Not on file    Marital Status: Not on file   Intimate Partner Violence:     Fear of Current or Ex-Partner: Not on file    Emotionally Abused: Not on file    Physically Abused: Not on file    Sexually Abused: Not on file   Housing Stability:     Unable to Pay for Housing in the Last Year: Not on file    Number of [place] in the Last Year: Not on file    Unstable Housing in the Last Year: Not on file         Allergies   Allergen Reactions    Adhesive Tape-Silicones Rash and Itching    Darvocet A500 [Propoxyphene N-Acetaminophen] Nausea Only and Other (comments)     headache    Diazepam Other (comments)     Not an allergy, just does not work for her at all.  Hydrocodone Nausea and Vomiting and Vertigo    Percocet [Oxycodone-Acetaminophen] Nausea Only and Other (comments)     headache    Tylenol-Codeine #2 Itching, Nausea Only and Other (comments)     HEADACHES              Current Outpatient Medications   Medication Sig Dispense Refill    HYDROmorphone (Dilaudid) 2 mg tablet Take 2 Tablets by mouth every six (6) hours as needed for Pain for up to 7 days. Max Daily Amount: 16 mg. Post op. Not responding to hydrocodone or oxycodone. Advised patient to bring remaining pill supply to pharmacy for disposal. 15 Tablet 0    Latuda 20 mg tab tablet       meloxicam (MOBIC) 15 mg tablet Take 1 Tablet by mouth daily for 30 days. 30 Tablet 2    omeprazole (PRILOSEC) 20 mg capsule Take 1 Capsule by mouth daily. 30 Capsule 2    amLODIPine (NORVASC) 5 mg tablet Take 1 Tablet by mouth daily. 30 Tablet 2    meclizine (ANTIVERT) 25 mg tablet TK 1 T PO UP TO TID PRN 10 Tab 0    cetirizine (ZYRTEC) 10 mg tablet TK 1 T PO QD  Indications: seasonal runny nose 30 Tab 5    traZODone (DESYREL) 100 mg tablet TK 2 TS PO QHS      Trintellix 20 mg tablet TK 1 T PO QD      diclofenac (VOLTAREN) 1 % gel Apply  to affected area four (4) times daily. 100 g 1    pyridoxine, vitamin B6, (VITAMIN B-6) 25 mg tablet Take 1 Tab by mouth daily. (Patient taking differently: Take 25 mg by mouth as needed.) 90 Tab 3    ibuprofen (MOTRIN) 800 mg tablet Take 1 Tab by mouth every six (6) hours as needed for Pain. 30 Tab 0    ferrous sulfate (FEOSOL) 325 mg (65 mg iron) tablet Take  by mouth Daily (before breakfast). Takes 3 tabs daily       busPIRone (BUSPAR) 5 mg tablet Take 15 mg by mouth three (3) times daily (with meals).  Indications: taking 15 mg BID      gabapentin (NEURONTIN) 300 mg capsule Take 300 mg by mouth three (3) times daily.  cyanocobalamin 1,000 mcg tablet Take 1 Tab by mouth daily. (Patient not taking: Reported on 1/27/2022) 90 Tab 3        ROS:  12 point review of systems was performed. All negative except for HPI     Physical Exam:  Visit Vitals  BP (!) 142/102   Pulse 85   Ht 5' 4\" (1.626 m)   Wt 203 lb (92.1 kg)   LMP 07/04/2014 (Approximate)   SpO2 98%   BMI 34.84 kg/m²       Gen:  Well-developed, well-nourished, in no acute distress  HEENT:  Pink conjunctivae, PERRL, hearing intact to voice, moist mucous membranes  Neck:  Supple, without masses, thyroid non-tender  Resp:  No accessory muscle use, clear breath sounds without wheezes rales or rhonchi  Card:  No murmurs, normal S1, S2 without thrills, bruits or peripheral edema  Abd:  Soft, non-tender, non-distended, normoactive bowel sounds are present, no palpable organomegaly and no detectable hernias  Lymph:  No cervical or inguinal adenopathy  Musc:  No cyanosis or clubbing  Skin:  No rashes or ulcers, skin turgor is good  Neuro:  Cranial nerves are grossly intact, no focal motor weakness, follows commands appropriately  Psych:  Good insight, oriented to person, place and time, alert     Labs:     Lab Results   Component Value Date/Time    WBC 4.3 01/11/2022 12:00 AM    HGB 14.2 01/11/2022 12:00 AM    HCT 42.5 01/11/2022 12:00 AM    PLATELET 697 24/45/8863 12:00 AM    MCV 87 01/11/2022 12:00 AM     Lab Results   Component Value Date/Time    Hemoglobin A1c 6.4 (H) 10/28/2016 02:48 PM    Glucose 108 (H) 10/02/2018 04:02 PM    LDL, calculated 107 (H) 01/04/2016 03:04 PM    Creatinine 0.83 10/02/2018 04:02 PM      Lab Results   Component Value Date/Time    Cholesterol, total 200 (H) 01/04/2016 03:04 PM    HDL Cholesterol 61 01/04/2016 03:04 PM    LDL, calculated 107 (H) 01/04/2016 03:04 PM    Triglyceride 158 (H) 01/04/2016 03:04 PM     Lab Results   Component Value Date/Time    ALT (SGPT) 38 (H) 10/02/2018 04:02 PM    Alk.  phosphatase 114 10/02/2018 04:02 PM    Bilirubin, total 0.2 10/02/2018 04:02 PM    Albumin 4.4 10/02/2018 04:02 PM    Protein, total 7.3 10/21/2021 12:16 PM    PLATELET 565 96/94/6261 12:00 AM     No results found for: INR, INREXT, PTMR, PTP, PT1, PT2, INREXT   Lab Results   Component Value Date/Time    GFR est non-AA 84 10/02/2018 04:02 PM    GFR est AA 96 10/02/2018 04:02 PM    Creatinine 0.83 10/02/2018 04:02 PM    BUN 9 10/02/2018 04:02 PM    Sodium 142 10/02/2018 04:02 PM    Potassium 4.0 10/02/2018 04:02 PM    Chloride 99 10/02/2018 04:02 PM    CO2 23 10/02/2018 04:02 PM    Magnesium 2.0 10/27/2016 12:28 PM    Phosphorus 3.3 06/15/2016 03:30 AM     No results found for: PSA, Karolyn Young, [id number], [id number]  Lab Results   Component Value Date/Time    TSH 1.790 10/21/2021 12:16 PM    T4, Free 0.88 10/21/2021 12:16 PM      Lab Results   Component Value Date/Time    Glucose 108 (H) 10/02/2018 04:02 PM      Lab Results   Component Value Date/Time    CK - MB <0.5 (L) 06/12/2016 05:45 AM    CK-MB Index CANNOT BE CALCULATED 06/12/2016 05:45 AM    Troponin-I, Qt. <0.04 06/12/2016 05:45 AM      No results found for: BNP, BNPP, BNPPPOC, XBNPT, BNPNT   Lab Results   Component Value Date/Time    Sodium 142 10/02/2018 04:02 PM    Potassium 4.0 10/02/2018 04:02 PM    Chloride 99 10/02/2018 04:02 PM    CO2 23 10/02/2018 04:02 PM    Anion gap 9 10/27/2016 12:28 PM    Glucose 108 (H) 10/02/2018 04:02 PM    BUN 9 10/02/2018 04:02 PM    Creatinine 0.83 10/02/2018 04:02 PM    BUN/Creatinine ratio 11 10/02/2018 04:02 PM    GFR est AA 96 10/02/2018 04:02 PM    GFR est non-AA 84 10/02/2018 04:02 PM    Calcium 10.2 10/02/2018 04:02 PM      Lab Results   Component Value Date/Time    Sodium 142 10/02/2018 04:02 PM    Potassium 4.0 10/02/2018 04:02 PM    Chloride 99 10/02/2018 04:02 PM    CO2 23 10/02/2018 04:02 PM    Anion gap 9 10/27/2016 12:28 PM    Glucose 108 (H) 10/02/2018 04:02 PM    BUN 9 10/02/2018 04:02 PM Creatinine 0.83 10/02/2018 04:02 PM    BUN/Creatinine ratio 11 10/02/2018 04:02 PM    GFR est AA 96 10/02/2018 04:02 PM    GFR est non-AA 84 10/02/2018 04:02 PM    Calcium 10.2 10/02/2018 04:02 PM    Bilirubin, total 0.2 10/02/2018 04:02 PM    ALT (SGPT) 38 (H) 10/02/2018 04:02 PM    Alk. phosphatase 114 10/02/2018 04:02 PM    Protein, total 7.3 10/21/2021 12:16 PM    Albumin 4.4 10/02/2018 04:02 PM    Globulin 4.1 (H) 10/27/2016 12:28 PM    A-G Ratio 1.3 10/02/2018 04:02 PM      Lab Results   Component Value Date/Time    Hemoglobin A1c 6.4 (H) 10/28/2016 02:48 PM         No results for input(s): CPK, CKMB, TROIQ in the last 72 hours. No lab exists for component: CKQMB, CPKMB        Problem List:     Problem List  Date Reviewed: 1/24/2022          Codes Class Noted    Murmur, cardiac ICD-10-CM: R01.1  ICD-9-CM: 785.2  8/14/2019        Obesity, morbid ([facility] ICD-10-CM: E66.01  ICD-9-CM: 278.01  10/2/2018        History of lumpectomy of right breast ICD-10-CM: Z98.890  ICD-9-CM: V45.89  10/2/2018        Memory loss ICD-10-CM: R41.3  ICD-9-CM: 780.93  10/2/2018        Fatigue ICD-10-CM: R53.83  ICD-9-CM: 780.79  10/2/2018        Weight gain ICD-10-CM: R63.5  ICD-9-CM: 783.1  7/18/2017        Postmenopausal ICD-10-CM: Z78.0  ICD-9-CM: V49.81  7/18/2017        Breast cancer ([facility] ICD-10-CM: C50.919  ICD-9-CM: 174.9  1/13/2017    Overview Addendum 4/24/2017  4:50 PM by Kenzie Urbina RN     01/29/2016: RIGHT core bx of [address] with [facility]. ER+100%/GA+90% Her2- ([facility]: ER+80%/GA+20%)    03/31/2016: RIGHT lumpectomy with SNBx of 1.9 cm, gr2 ILC with DCIS and LCIS. 2/2 nodes involved. Clear margins.  pT1c pN1a Mx    05/17/2016: RIGHT ALND. 0/10 nodes involved    08/18/2016: completed adjuvant chemo (TC x4)    09/23/2016-01/10/2017: completed all but 8 treatments of XRT (stopped midway and started back again)    02/2017: started hormone therapy (Arimidex)             Generalized pain ICD-10-CM: R52  ICD-9-CM: 780.96 10/27/2016        Swelling of arm ICD-10-CM: M79.89  ICD-9-CM: 729.81  7/7/2016        Cellulitis of breast ICD-10-CM: N61.0  ICD-9-CM: 611.0  6/11/2016        Sepsis ([address] ICD-10-CM: A41.9  ICD-9-CM: 038.9, 995.91  6/11/2016        Sepsis affecting skin ICD-10-CM: A41.9  ICD-9-CM: 038.9, 995.91  6/11/2016        Chemotherapy follow-up examination ICD-10-CM: Q09  ICD-9-CM: V67.2  6/3/2016        Breast cancer metastasized to axillary lymph node (HCC) ICD-10-CM: C50.919, C77.3  ICD-9-CM: 174.9, 196.3  5/20/2016        Hot flashes ICD-10-CM: R23.2  ICD-9-CM: 782.62  5/20/2016        Port catheter in place ICD-10-CM: Z95.828  ICD-9-CM: V45.89  5/20/2016        Elevated hemoglobin A1c ICD-10-CM: R73.09  ICD-9-CM: 790.29  5/10/2016    Overview Signed 5/10/2016  7:01 PM by Estefany Dow MD     2/24/2016: 6.2             Low vitamin D level ICD-10-CM: R79.89  ICD-9-CM: 790.6  5/10/2016        Essential hypertension ICD-10-CM: I10  ICD-9-CM: 401.9  4/27/2016        Arthritis ICD-10-CM: M19.90  ICD-9-CM: 716.90  4/27/2016        Gastroesophageal reflux disease without esophagitis ICD-10-CM: K21.9  ICD-9-CM: 530.81  4/27/2016        Anxiety about health ICD-10-CM: F41.8  ICD-9-CM: 300.09  4/27/2016        S/P lumpectomy, right breast ICD-10-CM: S50.190  ICD-9-CM: V45.89  4/27/2016        History of partial hysterectomy ICD-10-CM: Z90.711  ICD-9-CM: V45.89  4/27/2016        Counseling for estrogen replacement therapy ICD-10-CM: Z71.89  ICD-9-CM: V07.4  4/27/2016        Malignant neoplasm of right breast, stage 2 ([facility] ICD-10-CM: C50.911  ICD-9-CM: 174.9  4/7/2016        Lobular breast cancer ([facility] ICD-10-CM: C50.919  ICD-9-CM: 174.9  2/10/2016                Amando Castellon MD, [facility] [place]

## 2022-02-17 ENCOUNTER — HOSPITAL ENCOUNTER (OUTPATIENT)
Dept: PREADMISSION TESTING | Age: 52
Discharge: HOME | End: 2022-02-17
Attending: INTERNAL MEDICINE
Payer: MEDICAID

## 2022-02-17 ENCOUNTER — TRANSCRIBE ORDER (OUTPATIENT)
Dept: REGISTRATION | Age: 52
End: 2022-02-17

## 2022-02-17 DIAGNOSIS — U07.1 COVID-19: Primary | ICD-10-CM

## 2022-02-17 DIAGNOSIS — U07.1 COVID-19: ICD-10-CM

## 2022-02-17 PROCEDURE — U0005 INFEC AGEN DETEC AMPLI PROBE: HCPCS

## 2022-02-18 LAB
SARS-COV-2, XPLCVT: NOT DETECTED
SOURCE, COVRS: NORMAL

## 2022-02-21 ENCOUNTER — ANESTHESIA EVENT (OUTPATIENT)
Dept: ENDOSCOPY | Age: 52
End: 2022-02-21
Payer: MEDICAID

## 2022-02-21 ENCOUNTER — ANESTHESIA (OUTPATIENT)
Dept: ENDOSCOPY | Age: 52
End: 2022-02-21
Payer: MEDICAID

## 2022-02-21 ENCOUNTER — HOSPITAL ENCOUNTER (OUTPATIENT)
Age: 52
Setting detail: OUTPATIENT SURGERY
Discharge: HOME | End: 2022-02-21
Attending: INTERNAL MEDICINE | Admitting: INTERNAL MEDICINE
Payer: MEDICAID

## 2022-02-21 VITALS
WEIGHT: 200 LBS | HEART RATE: 73 BPM | RESPIRATION RATE: 14 BRPM | OXYGEN SATURATION: 100 % | BODY MASS INDEX: 34.15 KG/M2 | SYSTOLIC BLOOD PRESSURE: 133 MMHG | HEIGHT: 64 IN | DIASTOLIC BLOOD PRESSURE: 77 MMHG | TEMPERATURE: 98.6 F

## 2022-02-21 PROCEDURE — 74011000250 HC RX REV CODE- 250: Performed by: NURSE ANESTHETIST, CERTIFIED REGISTERED

## 2022-02-21 PROCEDURE — 76040000019: Performed by: INTERNAL MEDICINE

## 2022-02-21 PROCEDURE — 76060000031 HC ANESTHESIA FIRST 0.5 HR: Performed by: INTERNAL MEDICINE

## 2022-02-21 PROCEDURE — 2709999900 HC NON-CHARGEABLE SUPPLY: Performed by: INTERNAL MEDICINE

## 2022-02-21 PROCEDURE — 74011250636 HC RX REV CODE- 250/636: Performed by: NURSE ANESTHETIST, CERTIFIED REGISTERED

## 2022-02-21 RX ORDER — EPINEPHRINE 0.1 MG/ML
1 INJECTION INTRACARDIAC; INTRAVENOUS
Status: DISCONTINUED | OUTPATIENT
Start: 2022-02-21 | End: 2022-02-21 | Stop reason: HOSPADM

## 2022-02-21 RX ORDER — ATROPINE SULFATE 0.1 MG/ML
0.5 INJECTION INTRAVENOUS
Status: DISCONTINUED | OUTPATIENT
Start: 2022-02-21 | End: 2022-02-21 | Stop reason: HOSPADM

## 2022-02-21 RX ORDER — MIDAZOLAM HYDROCHLORIDE 1 MG/ML
.25-5 INJECTION, SOLUTION INTRAMUSCULAR; INTRAVENOUS
Status: DISCONTINUED | OUTPATIENT
Start: 2022-02-21 | End: 2022-02-21 | Stop reason: HOSPADM

## 2022-02-21 RX ORDER — SODIUM CHLORIDE 9 MG/ML
INJECTION, SOLUTION INTRAVENOUS
Status: DISCONTINUED | OUTPATIENT
Start: 2022-02-21 | End: 2022-02-21 | Stop reason: HOSPADM

## 2022-02-21 RX ORDER — PROPOFOL 10 MG/ML
INJECTION, EMULSION INTRAVENOUS AS NEEDED
Status: DISCONTINUED | OUTPATIENT
Start: 2022-02-21 | End: 2022-02-21 | Stop reason: HOSPADM

## 2022-02-21 RX ORDER — FLUMAZENIL 0.1 MG/ML
0.2 INJECTION INTRAVENOUS
Status: DISCONTINUED | OUTPATIENT
Start: 2022-02-21 | End: 2022-02-21 | Stop reason: HOSPADM

## 2022-02-21 RX ORDER — FENTANYL CITRATE 50 UG/ML
25-200 INJECTION, SOLUTION INTRAMUSCULAR; INTRAVENOUS
Status: DISCONTINUED | OUTPATIENT
Start: 2022-02-21 | End: 2022-02-21 | Stop reason: HOSPADM

## 2022-02-21 RX ORDER — LIDOCAINE HYDROCHLORIDE 20 MG/ML
INJECTION, SOLUTION EPIDURAL; INFILTRATION; INTRACAUDAL; PERINEURAL AS NEEDED
Status: DISCONTINUED | OUTPATIENT
Start: 2022-02-21 | End: 2022-02-21 | Stop reason: HOSPADM

## 2022-02-21 RX ORDER — DEXTROMETHORPHAN/PSEUDOEPHED 2.5-7.5/.8
1.2 DROPS ORAL
Status: DISCONTINUED | OUTPATIENT
Start: 2022-02-21 | End: 2022-02-21 | Stop reason: HOSPADM

## 2022-02-21 RX ORDER — SODIUM CHLORIDE 0.9 % (FLUSH) 0.9 %
5-40 SYRINGE (ML) INJECTION AS NEEDED
Status: DISCONTINUED | OUTPATIENT
Start: 2022-02-21 | End: 2022-02-21 | Stop reason: HOSPADM

## 2022-02-21 RX ORDER — SODIUM CHLORIDE 9 MG/ML
50 INJECTION, SOLUTION INTRAVENOUS CONTINUOUS
Status: DISCONTINUED | OUTPATIENT
Start: 2022-02-21 | End: 2022-02-21 | Stop reason: HOSPADM

## 2022-02-21 RX ORDER — NALOXONE HYDROCHLORIDE 0.4 MG/ML
0.4 INJECTION, SOLUTION INTRAMUSCULAR; INTRAVENOUS; SUBCUTANEOUS
Status: DISCONTINUED | OUTPATIENT
Start: 2022-02-21 | End: 2022-02-21 | Stop reason: HOSPADM

## 2022-02-21 RX ORDER — SODIUM CHLORIDE 0.9 % (FLUSH) 0.9 %
5-40 SYRINGE (ML) INJECTION EVERY 8 HOURS
Status: DISCONTINUED | OUTPATIENT
Start: 2022-02-21 | End: 2022-02-21 | Stop reason: HOSPADM

## 2022-02-21 RX ADMIN — LIDOCAINE HYDROCHLORIDE 80 MG: 20 INJECTION, SOLUTION EPIDURAL; INFILTRATION; INTRACAUDAL; PERINEURAL at 09:37

## 2022-02-21 RX ADMIN — PROPOFOL 50 MG: 10 INJECTION, EMULSION INTRAVENOUS at 09:36

## 2022-02-21 RX ADMIN — SODIUM CHLORIDE: 900 INJECTION, SOLUTION INTRAVENOUS at 09:23

## 2022-02-21 RX ADMIN — PROPOFOL 50 MG: 10 INJECTION, EMULSION INTRAVENOUS at 09:37

## 2022-02-21 RX ADMIN — PROPOFOL 10 MG: 10 INJECTION, EMULSION INTRAVENOUS at 09:38

## 2022-02-21 NOTE — H&P
[address]  174 [facility], [address] [place] [address]      History and Physical       NAME:  Evelin Blackman   :   1970   MRN:   [id number]             History of Present Illness:  Patient is a 45 y.o. who is seen for epigastric pain. PMH:  Past Medical History:   Diagnosis Date    Arthritis     HANDS AND KNEES    Asthma     Breast cancer ([facility] [address] 2017    Carcinoma in situ    Cancer ([facility] [address] 2016    RIGHT breast cancer    Depression     GERD (gastroesophageal reflux disease)     Heart murmur     Hypertension     Neuropathy     PUD (peptic ulcer disease)     S/P radiation therapy     and chemo finished all 2017    Vertigo        PSH:  Past Surgical History:   Procedure Laterality Date    HAND/FINGER SURGERY UNLISTED Bilateral     bilateral endoscopic carpal tunnel releases     HX BREAST BIOPSY Right     SEVERAL TIMES    HX BREAST LUMPECTOMY Right 3/31/2016    RIGHT BREAST LUMPECTOMY, RIGHT SENTINEL NODE BIOPSY WITH ULTRASOUND performed by Ellie Hughes MD at [address] HX CHOLECYSTECTOMY      HX GI      COLONOSCOPY    HX GYN      cesarian x 3    HX HYSTERECTOMY      Still has ovaries    HX ORTHOPAEDIC Left     ARM FX WITH PINS    HX TONSILLECTOMY      HX UROLOGICAL      CYSTOSCOPY    HX UROLOGICAL      BLADDER SLING    KAREN STEREO  BX BREAST RT 1ST LESION W/CLIP AND SPECIMEN Right     benign    KAREN US BX BREAST RT 1ST LESION W/CLIP AND SPECIMEN Right 2016    Ca in situ    [place] BREAST SURGERY PROCEDURE UNLISTED Right 16    RIGHT axillary lymph node dissection and PAC insertion       Allergies: Allergies   Allergen Reactions    Latex Other (comments)     Rash, skin breakdown    Adhesive Tape-Silicones Rash and Itching    Darvocet A500 [Propoxyphene N-Acetaminophen] Nausea Only and Other (comments)     headache    Diazepam Other (comments)     Not an allergy, just does not work for her at all.     Hydrocodone Nausea and Vomiting and Vertigo    Percocet [Oxycodone-Acetaminophen] Nausea Only and Other (comments)     headache    Tylenol-Codeine #2 Itching, Nausea Only and Other (comments)     HEADACHES         Home Medications:  Prior to Admission Medications   Prescriptions Last Dose Informant Patient Reported? Taking? Latuda 20 mg tab tablet 2/20/2022 at Unknown time  Yes Yes   Trintellix 20 mg tablet 2/20/2022 at Unknown time  Yes Yes   Sig: TK 1 T PO QD   amLODIPine (NORVASC) 5 mg tablet 2/20/2022 at Unknown time  No Yes   Sig: Take 1 Tablet by mouth daily. busPIRone (BUSPAR) 5 mg tablet 2/20/2022 at Unknown time  Yes Yes   Sig: Take 15 mg by mouth three (3) times daily (with meals). Indications: taking 15 mg BID   cetirizine (ZYRTEC) 10 mg tablet Not Taking at Unknown time  No No   Sig: TK 1 T PO QD  Indications: seasonal runny nose   Patient not taking: Reported on 2/21/2022   cyanocobalamin 1,000 mcg tablet Not Taking at Unknown time  No No   Sig: Take 1 Tab by mouth daily. Patient not taking: Reported on 1/27/2022   diclofenac (VOLTAREN) 1 % gel   No No   Sig: Apply  to affected area four (4) times daily. ferrous sulfate (FEOSOL) 325 mg (65 mg iron) tablet Not Taking at Unknown time  Yes No   Sig: Take  by mouth Daily (before breakfast). Takes 3 tabs daily    Patient not taking: Reported on 2/21/2022   gabapentin (NEURONTIN) 300 mg capsule 2/20/2022 at Unknown time  Yes Yes   Sig: Take 300 mg by mouth three (3) times daily. ibuprofen (MOTRIN) 800 mg tablet 1/21/2022 at Unknown time  No Yes   Sig: Take 1 Tab by mouth every six (6) hours as needed for Pain. meclizine (ANTIVERT) 25 mg tablet Not Taking at Unknown time  No No   Sig: TK 1 T PO UP TO TID PRN   Patient not taking: Reported on 2/21/2022   omeprazole (PRILOSEC) 20 mg capsule 1/21/2022 at Unknown time  No Yes   Sig: Take 1 Capsule by mouth daily.    pyridoxine, vitamin B6, (VITAMIN B-6) 25 mg tablet Not Taking at Unknown time  No No   Sig: Take 1 Tab by mouth daily. Patient not taking: Reported on 2/21/2022   traZODone (DESYREL) 100 mg tablet 1/21/2022 at Unknown time  Yes Yes   Sig: TK 2 TS PO QHS      Facility-Administered Medications: None       Hospital Medications:  Current Facility-Administered Medications   Medication Dose Route Frequency    0.9% sodium chloride infusion  50 mL/hr IntraVENous CONTINUOUS    sodium chloride (NS) flush 5-40 mL  5-40 mL IntraVENous Q8H    sodium chloride (NS) flush 5-40 mL  5-40 mL IntraVENous PRN    midazolam (VERSED) injection 0.25-5 mg  0.25-5 mg IntraVENous Multiple    fentaNYL citrate (PF) injection  mcg   mcg IntraVENous Multiple    naloxone (NARCAN) injection 0.4 mg  0.4 mg IntraVENous Multiple    flumazeniL (ROMAZICON) 0.1 mg/mL injection 0.2 mg  0.2 mg IntraVENous Multiple    simethicone (MYLICON) 87OW/6.9QW oral drops 80 mg  1.2 mL Oral Multiple    atropine injection 0.5 mg  0.5 mg IntraVENous ONCE PRN    EPINEPHrine (ADRENALIN) 0.1 mg/mL syringe 1 mg  1 mg Endoscopically ONCE PRN       Social History:  Social History     Tobacco Use    Smoking status: Never Smoker    Smokeless tobacco: Never Used    Tobacco comment:  smokes   Substance Use Topics    Alcohol use: Yes     Alcohol/week: 0.0 standard drinks     Comment: occasionally a glass of wine       Family History:  Family History   Problem Relation Age of Onset    Hypertension Mother     Kidney Disease Mother         ON DIALYSIS    Heart Disease Mother     No Known Problems Father     Asthma Son     Asthma Daughter     No Known Problems Son     Arthritis-rheumatoid Paternal Aunt     Anesth Problems Neg Hx          The patient was counseled at length about the risks of charly Covid-19 in the shazia-operative and post-operative states including the recovery window of their procedure.   The patient was made aware that charly Covid-19 after a surgical procedure may worsen their prognosis for recovering from the virus and lend to a higher morbidity and or mortality risk. The patient was given the options of postponing their procedure. All of the risks, benefits, and alternatives were discussed. The patient does  wish to proceed with the procedure. Review of Systems:      Constitutional: negative fever, negative chills, negative weight loss  Eyes:   negative visual changes  ENT:   negative sore throat, tongue or lip swelling  Respiratory:  negative cough, negative dyspnea  Cards:  negative for chest pain, palpitations, lower extremity edema  GI:   See HPI  :  negative for frequency, dysuria  Integument:  negative for rash and pruritus  Heme:  negative for easy bruising and gum/nose bleeding  Musculoskel: negative for myalgias,  back pain and muscle weakness  Neuro: negative for headaches, dizziness, vertigo  Psych:  negative for feelings of anxiety, depression       Objective:     Patient Vitals for the past 8 hrs:   BP Temp Pulse Resp Height Weight   02/21/22 0924 (!) 143/91 98.6 °F (37 °C) 70 20 -- --   02/21/22 0912 -- -- -- -- 5' 4\" (1.626 m) 90.7 kg (200 lb)     No intake/output data recorded. No intake/output data recorded. EXAM:     NEURO-a&o   HEENT-wnl   LUNGS-clear    COR-regular rate and rhythym     ABD-soft , no tenderness, no rebound, good bs     EXT-no edema     Data Review     No results for input(s): WBC, HGB, HCT, PLT, HGBEXT, HCTEXT, PLTEXT in the last 72 hours. No results for input(s): NA, K, CL, CO2, BUN, CREA, GLU, PHOS, CA in the last 72 hours. No results for input(s): AP, TBIL, TP, ALB, GLOB, GGT, AML, LPSE in the last 72 hours. No lab exists for component: SGOT, GPT, AMYP, HLPSE  No results for input(s): INR, PTP, APTT, INREXT in the last 72 hours.        Assessment:     · Epigastric pain     Patient Active Problem List   Diagnosis Code    Lobular breast cancer ([facility] [address] C50.919    Malignant neoplasm of right breast, stage 2 ([facility] [address] C50.911    Essential hypertension I10    Arthritis M19.90  Gastroesophageal reflux disease without esophagitis K21.9    Anxiety about health F41.8    S/P lumpectomy, right breast Z98.890    History of partial hysterectomy Z90.711    Counseling for estrogen replacement therapy Z71.89    Elevated hemoglobin A1c R73.09    Low vitamin D level R79.89    Breast cancer metastasized to axillary lymph node (HCC) C50.919, C77.3    Hot flashes R23.2    Port catheter in place Z95.828    Chemotherapy follow-up examination Z09    Cellulitis of breast N61.0    Sepsis (HCC) A41.9    Sepsis affecting skin A41.9    Swelling of arm M79.89    Generalized pain R52    Breast cancer ([address] C50.919    Weight gain R63.5    Postmenopausal Z78.0    Obesity, morbid (HCC) E66.01    History of lumpectomy of right breast Z98.890    Memory loss R41.3    Fatigue R53.83    Murmur, cardiac R01.1         Plan:   ·   · Endoscopic procedure with sedation     Signed By: Chuck Ledesma MD     2/21/2022  9:26 AM

## 2022-02-21 NOTE — DISCHARGE INSTRUCTIONS
[address], [address]    EGD DISCHARGE INSTRUCTIONS    Sebastián Main  [id number]  1970    Discomfort:  Sore throat- throat lozenges or warm salt water gargle  redness at IV site- apply warm compress to area; if redness or soreness persist- contact your physician  Gaseous discomfort- walking, belching will help relieve any discomfort  You may not operate a vehicle for 12 hours  You may not engage in an occupation involving machinery or appliances for rest of today  You may not drink alcoholic beverages for at least 12 hours  Avoid making any critical decisions for at least 24 hour  DIET  You may resume your regular diet - however -  remember your colon is empty and a heavy meal will produce gas. Avoid these foods:  vegetables, fried / greasy foods, carbonated drinks    ACTIVITY  You may resume your normal daily activities   Spend the remainder of the day resting -  avoid any strenuous activity. CALL M.D. ANY SIGN OF   Increasing pain, nausea, vomiting  Abdominal distension (swelling)  New increased bleeding (oral or rectal)  Fever (chills)  Pain in chest area  Bloody discharge from nose or mouth  Shortness of breath    Follow-up Instructions:   Call Dr. Dee Cosme for any questions or problems. Telephone # [phone]  To follow up with your surgeon, Dr Abel Styles, as scheduled    ENDOSCOPY FINDINGS:   Your endoscopy was normal.    Signed By: Dee Cosme MD     2/21/2022  9:45 AM         Learning About Coronavirus (COVID-19)  Coronavirus (COVID-19): Overview  What is coronavirus (YJNIA-84)? The coronavirus disease (COVID-19) is caused by a virus. It is an illness that was first found in [place], [place], in December 2019. It has since spread worldwide. The virus can cause fever, cough, and trouble breathing. In severe cases, it can cause pneumonia and make it hard to breathe without help. It can cause death.   Coronaviruses are a large group of viruses. They cause the common cold. They also cause more serious illnesses like Middle East respiratory syndrome (MERS) and severe acute respiratory syndrome (SARS). COVID-19 is caused by a novel coronavirus. That means it's a new type that has not been seen in people before. This virus spreads person-to-person through droplets from coughing and sneezing. It can also spread when you are close to someone who is infected. And it can spread when you touch something that has the virus on it, such as a doorknob or a tabletop. What can you do to protect yourself from coronavirus (COVID-19)? The best way to protect yourself from getting sick is to:  · Avoid areas where there is an outbreak. · Avoid contact with people who may be infected. · Wash your hands often with soap or alcohol-based hand sanitizers. · Avoid crowds and try to stay at least 6 feet away from other people. · Wash your hands often, especially after you cough or sneeze. Use soap and water, and scrub for at least 20 seconds. If soap and water aren't available, use an alcohol-based hand . · Avoid touching your mouth, nose, and eyes. What can you do to avoid spreading the virus to others? To help avoid spreading the virus to others:  · Cover your mouth with a tissue when you cough or sneeze. Then throw the tissue in the trash. · Use a disinfectant to clean things that you touch often. · Stay home if you are sick or have been exposed to the virus. Don't go to school, work, or public areas. And don't use public transportation. · If you are sick:  ? Leave your home only if you need to get medical care. But call the doctor's office first so they know you're coming. And wear a face mask, if you have one.  ? If you have a face mask, wear it whenever you're around other people. It can help stop the spread of the virus when you cough or sneeze. ? Clean and disinfect your home every day.  Use household  and disinfectant wipes or sprays. Take special care to clean things that you grab with your hands. These include doorknobs, remote controls, phones, and handles on your refrigerator and microwave. And don't forget countertops, tabletops, bathrooms, and computer keyboards. When to call for help  Call 911 anytime you think you may need emergency care. For example, call if:  · You have severe trouble breathing. (You can't talk at all.)  · You have constant chest pain or pressure. · You are severely dizzy or lightheaded. · You are confused or can't think clearly. · Your face and lips have a blue color. · You pass out (lose consciousness) or are very hard to wake up. Call your doctor now if you develop symptoms such as:  · Shortness of breath. · Fever. · Cough. If you need to get care, call ahead to the doctor's office for instructions before you go. Make sure you wear a face mask, if you have one, to prevent exposing other people to the virus. Where can you get the latest information? The following health organizations are tracking and studying this virus. Their websites contain the most up-to-date information. Luna Garibay also learn what to do if you think you may have been exposed to the virus. · U.S. Centers for Disease Control and Prevention (CDC): The CDC provides updated news about the disease and travel advice. The website also tells you how to prevent the spread of infection. www.cdc.gov  · World Health Organization [facility]): WHO offers information about the virus outbreaks. WHO also has travel advice. www.who.int  Current as of: April 1, 2020               Content Version: 12.4  © 5579-1728 [employer]. Care instructions adapted under license by your healthcare professional. If you have questions about a medical condition or this instruction, always ask your healthcare professional. [address] any warranty or liability for your use of this information.

## 2022-02-21 NOTE — PROGRESS NOTES

## 2022-02-21 NOTE — ANESTHESIA POSTPROCEDURE EVALUATION
Post-Anesthesia Evaluation and Assessment    Patient: Yamliex Rushing MRN: [id number]  SSN: [id number]    YOB: 1970  Age: 45 y.o. Sex: female      I have evaluated the patient and they are stable and ready for discharge from the PACU. Cardiovascular Function/Vital Signs  Visit Vitals  /77   Pulse 73   Temp 37 °C (98.6 °F)   Resp 14   Ht 5' 4\" (1.626 m)   Wt 90.7 kg (200 lb)   SpO2 100%   Breastfeeding No   BMI 34.33 kg/m²       Patient is status post MAC anesthesia for Procedure(s):  ESOPHAGOGASTRODUODENOSCOPY (EGD)  :-.    Nausea/Vomiting: None    Postoperative hydration reviewed and adequate. Pain:  Pain Scale 1: Numeric (0 - 10) (02/21/22 1000)  Pain Intensity 1: 0 (02/21/22 1000)   Managed    Neurological Status: At baseline    Mental Status, Level of Consciousness: Alert and  oriented to person, place, and time    Pulmonary Status:   O2 Device: None (Room air) (02/21/22 1000)   Adequate oxygenation and airway patent    Complications related to anesthesia: None    Post-anesthesia assessment completed. No concerns    Signed By: Radha Negro MD     February 21, 2022              Procedure(s):  ESOPHAGOGASTRODUODENOSCOPY (EGD)  :-.    MAC    <BSHSIANPOST>    INITIAL Post-op Vital signs:   Vitals Value Taken Time   /77 02/21/22 1000   Temp     Pulse 77 02/21/22 1023   Resp 21 02/21/22 1023   SpO2 95 % 02/21/22 1023   Vitals shown include unvalidated device data.

## 2022-02-21 NOTE — PROCEDURES
[facility]  174 [facility], [address] [place] [address]        Esophago- Gastroduodenoscopy (EGD) Procedure Note    Liliam Sunshine  1970  [id number]      Procedure: Endoscopic Gastroduodenoscopy --diagnostic    Indication:  Abdominal pain, epigastric , h/o gastric sleeve surgery, getting evaluated for GBP by Dr Chai Oconnor    Pre-operative Diagnosis: see indication above    Post-operative Diagnosis: see findings below    : Jason Oneal MD    Surgical Assistant: Endoscopy Technician-1: Saintclair Best  Endoscopy RN-1: Nisa Lawrence RN    Implants:  None    Referring Provider:  Cresencio Paulson NP      Anesthesia/Sedation:  MAC anesthesia Propofol        Procedure Details     After infomed consent was obtained for the procedure, with all risks and benefits of procedure explained the patient was taken to the endoscopy suite and placed in the left lateral decubitus position. Following sequential administration of sedation as per above, the endoscope was inserted into the mouth and advanced under direct vision to third portion of the duodenum. A careful inspection was made as the gastroscope was withdrawn, including a retroflexed view of the proximal stomach; findings and interventions are described below. Findings:   Esophagus:normal  Stomach: normal gastric sleeve anatomy    No ulcers, no stricture  Duodenum: normal      Therapies:  none    Specimens: none         EBL: None      Complications:   None; patient tolerated the procedure well. Impression:    -See post-procedure diagnoses.     Recommendations:  -follow up with your surgeon, Dr Flaco Griggs By: Jason Oneal MD     2/21/2022  9:43 AM

## 2022-02-21 NOTE — ANESTHESIA PREPROCEDURE EVALUATION
Anesthetic History   No history of anesthetic complications            Review of Systems / Medical History  Patient summary reviewed and pertinent labs reviewed    Pulmonary            Asthma : well controlled       Neuro/Psych   Within defined limits           Cardiovascular    Hypertension              Exercise tolerance: >4 METS  Comments: Benign murmur    GI/Hepatic/Renal     GERD      PUD    Comments: GERD rarely Endo/Other        Morbid obesity and arthritis     Other Findings              Physical Exam    Airway  Mallampati: II  TM Distance: 4 - 6 cm  Neck ROM: decreased range of motion   Mouth opening: Normal     Cardiovascular    Rhythm: regular  Rate: normal         Dental    Dentition: Upper partial plate     Pulmonary  Breath sounds clear to auscultation               Abdominal         Other Findings            Anesthetic Plan    ASA: 3  Anesthesia type: MAC          Induction: Intravenous  Anesthetic plan and risks discussed with: Patient

## 2022-03-04 NOTE — PROGRESS NOTES
HPI: Betty Gallagher (: 1970) is a 45 y.o. female, patient, here for evaluation of the following chief complaint(s):    Thumb Pain (Left thumb still has some pain  , can move it a little better , still has weakness )  Patient is seen once again to evaluate her hands. She had undergone bilateral wrist endoscopic carpal tunnel surgery on 2019. She feels she has been experiencing similar symptoms on both sides. She did have an EMG on 10/21/2021 that really only showed the residual of prior carpal tunnel syndrome although she still feels much more symptomatic on the left than the right side. She admits to a component of left thumb basal joint arthritic pain worsened with , pinch and twist turning motions. She has had documented de Quervain's tenosynovitis and has been performing stretching exercises without long-term relief. She underwent a left thumb CMC arthroplasty with de Quervain's release, APL interpositional tendon transfer, suspension plasty and revision left open carpal tunnel release with flexor tenosynovectomy on 2022. Vitals:  Ht 5' 4\" (1.626 m)   Wt 198 lb (89.8 kg)   LMP 2014 (Approximate)   BMI 33.99 kg/m²    Body mass index is 33.99 kg/m². Allergies   Allergen Reactions    Latex Other (comments)     Rash, skin breakdown    Adhesive Tape-Silicones Rash and Itching    Darvocet A500 [Propoxyphene N-Acetaminophen] Nausea Only and Other (comments)     headache    Diazepam Other (comments)     Not an allergy, just does not work for her at all.  Hydrocodone Nausea and Vomiting and Vertigo    Percocet [Oxycodone-Acetaminophen] Nausea Only and Other (comments)     headache    Tylenol-Codeine #2 Itching, Nausea Only and Other (comments)     HEADACHES         Current Outpatient Medications   Medication Sig    Latuda 20 mg tab tablet     amLODIPine (NORVASC) 5 mg tablet Take 1 Tablet by mouth daily.     traZODone (DESYREL) 100 mg tablet TK 2 TS PO QHS    Trintellix 20 mg tablet TK 1 T PO QD    diclofenac (VOLTAREN) 1 % gel Apply  to affected area four (4) times daily.  ibuprofen (MOTRIN) 800 mg tablet Take 1 Tab by mouth every six (6) hours as needed for Pain.  gabapentin (NEURONTIN) 300 mg capsule Take 300 mg by mouth three (3) times daily.  omeprazole (PRILOSEC) 20 mg capsule Take 1 Capsule by mouth daily. (Patient not taking: Reported on 3/7/2022)    meclizine (ANTIVERT) 25 mg tablet TK 1 T PO UP TO TID PRN (Patient not taking: Reported on 2/21/2022)    cetirizine (ZYRTEC) 10 mg tablet TK 1 T PO QD  Indications: seasonal runny nose (Patient not taking: Reported on 2/21/2022)    pyridoxine, vitamin B6, (VITAMIN B-6) 25 mg tablet Take 1 Tab by mouth daily. (Patient not taking: Reported on 2/21/2022)    cyanocobalamin 1,000 mcg tablet Take 1 Tab by mouth daily. (Patient not taking: Reported on 1/27/2022)    ferrous sulfate (FEOSOL) 325 mg (65 mg iron) tablet Take  by mouth Daily (before breakfast). Takes 3 tabs daily  (Patient not taking: Reported on 2/21/2022)    busPIRone (BUSPAR) 5 mg tablet Take 15 mg by mouth three (3) times daily (with meals). Indications: taking 15 mg BID (Patient not taking: Reported on 3/7/2022)     No current facility-administered medications for this visit.        Past Medical History:   Diagnosis Date    Arthritis     HANDS AND KNEES    Asthma     Breast cancer ([facility] [address] 03/31/2017    Carcinoma in situ    Cancer ([facility] [address] 2016    RIGHT breast cancer    Depression     GERD (gastroesophageal reflux disease)     Heart murmur     Hypertension     Neuropathy     PUD (peptic ulcer disease)     S/P radiation therapy 2016    and chemo finished all 1/2017    Vertigo         Past Surgical History:   Procedure Laterality Date    HAND/FINGER SURGERY UNLISTED Bilateral 2020    bilateral endoscopic carpal tunnel releases     HX BREAST BIOPSY Right     SEVERAL TIMES    HX BREAST LUMPECTOMY Right 3/31/2016    RIGHT BREAST LUMPECTOMY, RIGHT SENTINEL NODE BIOPSY WITH ULTRASOUND performed by Katie Chance MD at [address] HX CHOLECYSTECTOMY      HX GI      COLONOSCOPY    HX GYN      cesarian x 3    HX HYSTERECTOMY      Still has ovaries    HX ORTHOPAEDIC Left     ARM FX WITH PINS    HX TONSILLECTOMY      HX UROLOGICAL      CYSTOSCOPY    HX UROLOGICAL      BLADDER SLING    KAREN STEREO  BX BREAST RT 1ST LESION W/CLIP AND SPECIMEN Right 2013    benign    KAREN US BX BREAST RT 1ST LESION W/CLIP AND SPECIMEN Right 01/29/2016    Ca in situ    VA BREAST SURGERY PROCEDURE UNLISTED Right 5/17/16    RIGHT axillary lymph node dissection and PAC insertion       Family History   Problem Relation Age of Onset    Hypertension Mother     Kidney Disease Mother         ON DIALYSIS    Heart Disease Mother     No Known Problems Father     Asthma Son     Asthma Daughter     No Known Problems Son     Arthritis-rheumatoid Paternal Aunt     Anesth Problems Neg Hx         Social History     Tobacco Use    Smoking status: Never Smoker    Smokeless tobacco: Never Used    Tobacco comment:  smokes   Vaping Use    Vaping Use: Never used   Substance Use Topics    Alcohol use: Yes     Alcohol/week: 0.0 standard drinks     Comment: occasionally a glass of wine    Drug use: No        Review of Systems   All other systems reviewed and are negative. Physical Exam    Overall the wounds are healed. There is really no redness drainage or sign of infection. Good clinical alignment and early motion. Imaging:    XR Results (most recent):  Results from Appointment encounter on 03/07/22    XR THUMB LT MIN 2 V    Narrative  AP, lateral and oblique x-ray of the left thumb shows good post trapeziectomy space in height with good thumb metacarpal alignment no change of the surgical buttons. No fracture.        ASSESSMENT/PLAN:  Below is the assessment and plan developed based on review of pertinent history, physical exam, labs, studies, and medications. Patient underwent bilateral scopic carpal tunnel surgery in 2019 and now feels there is recurrence yet the EMG from October did not show any advanced findings. She seems to be more clinically symptomatic complaining of \"locking up\", \"dropping things\". She also describes a history of neuropathy. She has advanced thumb CMC basal joint arthritis on the left side with de Quervain's tenosynovitis. She was offered but deferred conservative treatment including injections and prefers to consider operative management. I offered her the option to undergo a left thumb CMC arthroplasty with de Quervain's release to utilize the APL as an interpositional tendon graft. At the same time she can undergo revision open carpal tunnel release with flexor tenosynovectomy. She indeed underwent a left thumb CMC arthroplasty with de Quervain's release, APL interpositional tendon transfer and revision left carpal tunnel release with flexor tenosynovectomy on 1/18/2022. I recommended a thumb spica splint for comfort and support. She may slowly work to restore motion that eventually strength in her hand. Follow-up in 3 to 4 weeks. 1. Primary osteoarthritis of first carpometacarpal joint of left hand  -     XR THUMB LT MIN 2 V; Future  -     REFERRAL TO PHYSICAL THERAPY  2. Bilateral carpal tunnel syndrome  3. Status post carpal tunnel release of both wrists  4. De Quervain's tenosynovitis, left  5. Bilateral hand pain      Return in about 6 weeks (around 4/18/2022). An electronic signature was used to authenticate this note.   -- Jack Encinas MD

## 2022-03-07 ENCOUNTER — OFFICE VISIT (OUTPATIENT)
Dept: ORTHOPEDIC SURGERY | Age: 52
End: 2022-03-07
Payer: MEDICAID

## 2022-03-07 VITALS — HEIGHT: 64 IN | WEIGHT: 198 LBS | BODY MASS INDEX: 33.8 KG/M2

## 2022-03-07 DIAGNOSIS — M79.641 BILATERAL HAND PAIN: ICD-10-CM

## 2022-03-07 DIAGNOSIS — M18.12 PRIMARY OSTEOARTHRITIS OF FIRST CARPOMETACARPAL JOINT OF LEFT HAND: Primary | ICD-10-CM

## 2022-03-07 DIAGNOSIS — G56.03 BILATERAL CARPAL TUNNEL SYNDROME: ICD-10-CM

## 2022-03-07 DIAGNOSIS — Z98.890 STATUS POST CARPAL TUNNEL RELEASE OF BOTH WRISTS: ICD-10-CM

## 2022-03-07 DIAGNOSIS — M79.642 BILATERAL HAND PAIN: ICD-10-CM

## 2022-03-07 DIAGNOSIS — M65.4 DE QUERVAIN'S TENOSYNOVITIS, LEFT: ICD-10-CM

## 2022-03-07 PROCEDURE — 99024 POSTOP FOLLOW-UP VISIT: CPT | Performed by: ORTHOPAEDIC SURGERY

## 2022-03-07 NOTE — PATIENT INSTRUCTIONS
Thumb Arthritis: Exercises  Introduction  Here are some examples of exercises for you to try. The exercises may be suggested for a condition or for rehabilitation. Start each exercise slowly. Ease off the exercises if you start to have pain. You will be told when to start these exercises and which ones will work best for you. How to do the exercises  Thumb IP flexion    1. Place your forearm and hand on a table with your affected thumb pointing up. 2. With your other hand, hold your thumb steady just below the joint nearest your thumbnail. 3. Bend the tip of your thumb downward, then straighten it. 4. Repeat 8 to 12 times. 5. Switch hands and repeat steps 1 through 4, even if only one thumb is sore. Thumb MP flexion    1. Place your forearm and hand on a table with your affected thumb pointing up. 2. With your other hand, hold the base of your thumb and palm steady. 3. Bend your thumb downward where it meets your palm, then straighten it. 4. Repeat 8 to 12 times. 5. Switch hands and repeat steps 1 through 4, even if only one thumb is sore. Thumb opposition    1. With your affected hand, point your fingers and thumb straight up. Your wrist should be relaxed, following the line of your fingers and thumb. 2. Touch your affected thumb to each finger, one finger at a time. This will look like an \"okay\" sign, but try to keep your other fingers straight and pointing upward as much as you can. 3. Repeat 8 to 12 times. 4. Switch hands and repeat steps 1 through 3, even if only one thumb is sore. Follow-up care is a key part of your treatment and safety. Be sure to make and go to all appointments, and call your doctor if you are having problems. It's also a good idea to know your test results and keep a list of the medicines you take. Where can you learn more? Go to http://www.gray.com/  Enter S402 in the search box to learn more about \"Thumb Arthritis: Exercises. \"  Current as of: July 1, 2021               Content Version: 13.0  © 8091-0826 Healthwise, [facility]. Care instructions adapted under license by [employer] (which disclaims liability or warranty for this information). If you have questions about a medical condition or this instruction, always ask your healthcare professional. [place] [address] any warranty or liability for your use of this information.

## 2022-03-07 NOTE — LETTER
3/7/2022    Patient: Gini Vo   YOB: 1970   Date of Visit: 3/7/2022     Fifi Yi NP  [address] [address]  Via In Basket    Dear Fifi Yi NP,      Thank you for referring Ms. Breana Rueda to [facility] for evaluation. My notes for this consultation are attached. If you have questions, please do not hesitate to call me. I look forward to following your patient along with you.       Sincerely,    Maty Remy MD

## 2022-03-18 PROBLEM — R63.5 WEIGHT GAIN: Status: ACTIVE | Noted: 2017-07-18

## 2022-03-18 PROBLEM — R01.1 MURMUR, CARDIAC: Status: ACTIVE | Noted: 2019-08-14

## 2022-03-19 PROBLEM — Z78.0 POSTMENOPAUSAL: Status: ACTIVE | Noted: 2017-07-18

## 2022-03-19 PROBLEM — R41.3 MEMORY LOSS: Status: ACTIVE | Noted: 2018-10-02

## 2022-03-19 PROBLEM — Z98.890 HISTORY OF LUMPECTOMY OF RIGHT BREAST: Status: ACTIVE | Noted: 2018-10-02

## 2022-03-19 PROBLEM — C50.919 BREAST CANCER (HCC): Status: ACTIVE | Noted: 2017-01-13

## 2022-03-20 PROBLEM — R53.83 FATIGUE: Status: ACTIVE | Noted: 2018-10-02

## 2022-03-20 PROBLEM — E66.01 OBESITY, MORBID (HCC): Status: ACTIVE | Noted: 2018-10-02

## 2022-03-24 NOTE — ED NOTES
Patient given discharge instructions. No questions or concerns at this time. Patient's VSS and in no acute distress. Patient ambulatory out of unit at discharge.
65.8

## 2022-04-05 ENCOUNTER — APPOINTMENT (OUTPATIENT)
Dept: PHYSICAL THERAPY | Age: 52
End: 2022-04-05

## 2022-04-20 ENCOUNTER — VIRTUAL VISIT (OUTPATIENT)
Dept: NEUROLOGY | Age: 52
End: 2022-04-20
Payer: MEDICAID

## 2022-04-20 DIAGNOSIS — G56.03 BILATERAL CARPAL TUNNEL SYNDROME: Primary | ICD-10-CM

## 2022-04-20 DIAGNOSIS — M65.4 DE QUERVAIN'S DISEASE (TENOSYNOVITIS): ICD-10-CM

## 2022-04-20 PROCEDURE — 99212 OFFICE O/P EST SF 10 MIN: CPT | Performed by: NURSE PRACTITIONER

## 2022-04-20 NOTE — LETTER
4/20/2022 12:31 PM    Ms. Sarbjit Jacob  [address] [id number]    Sarbjit Jacob was under the care of [facility]. Her anticipated return to work will be no sooner than July 25th, 2022 (07/25/2022). If there are questions or concerns please have the patient contact our office.           Sincerely,      Rima Salmeron NP

## 2022-04-20 NOTE — LETTER
4/20/2022 12:33 PM    Ms. Viveros Omayra  [address] [place] [id number]              Sincerely,      Luis Nelson NP

## 2022-04-20 NOTE — Clinical Note
NOTIFICATION RETURN TO WORK / SCHOOL    4/20/2022 12:33 PM    Ms. Lyudmila Ashford  [address] [place] [phone]      To Whom It May Concern:    Lyudmila Ashford is currently under the care of [facility]. She will return to work/school on: ***    If there are questions or concerns please have the patient contact our office.         Sincerely,      Maria Antonia Thompson NP

## 2022-04-20 NOTE — PROGRESS NOTES
Rosio Nagy is a 45 y.o. female who was seen by synchronous (real-time) audio-video technology on 4/20/2022 for Carpal Tunnel        Assessment & Plan:   Diagnoses and all orders for this visit:    1. Bilateral carpal tunnel syndrome    2. De Quervain's disease (tenosynovitis)      Bilateral CTS, status post left CTS release with pending PT. As this will be at least a six week therapy plan and then she is to have the right CTS release, will plan to remain out of work until at least July 25, 2022. I will plan to see her in follow up about a week prior to this. Subjective: Follow up for bilateral CTS. Prior to her last office visit, she did have the left carpal tunnel release surgery. She is getting set to start PT for the left hand. She will not have the right hand surgery done until after her PT for the left. This will be at least another six weeks. In the meantime, she has not completely recovered from her first procedure and hopes that the PT will help recover the strength lost in her left hand. Recap from LOV:  Bilateral carpal tunnel syndrome and de Quervain's tendinitis with recent hand and wrist surgery on the left hand. This does sound as though it is improving with regard to pain and function. Moving forward with her next surgical procedure to correct the issues in the right hand, would recommend either placing her on tramadol 100 mg 3 times daily or the Dilaudid 6 mg every 6 hours as needed for pain related to her surgical procedure. Of note the Dilaudid 6 mg seems to help with pain without any significant side effects to include sedation, respiratory depression, euphoria. 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 Moving forward, would recommend that she obtain some sort of dictation software even after her surgical procedures to prevent the repetitive stress injury related to the type of work she does. She should discuss obtaining this software as an accommodation through her employer. We will plan to keep her out of work for the next 3 months and reassess at that point. Today we also discussed her consideration for gastric bypass surgery. While she is considered obese, we discussed the significant lifestyle modifications that would be needed to maintain a gastric bypass surgical procedure. At 5 foot for approximately 196 pounds she has a BMI of 33.64 kg/m² placing her in a BMI category class I obesity. I would not tend to think that this is significant enough to warrant a gastric bypass surgical procedure. Actually recommended that she work on lifestyle modification to include engaging in nonweightbearing exercise such as rowing which would use approximately 86% of the muscles without significant joint strain. She should also be more cognizant of her diet as she tends to snack by her own admission and sometimes snacks chosen are not necessarily the healthiest.  Recommended that she shoot for goal of approximately 10% of her body weight over the next 3 months. We did discuss the fact that weight loss will help with the osteoarthritis but that it is also equally important for her to work on improvement of muscle tone to help support the joints in general.    Current Outpatient Medications   Medication Sig    Latuda 20 mg tab tablet     omeprazole (PRILOSEC) 20 mg capsule Take 1 Capsule by mouth daily. (Patient not taking: Reported on 3/7/2022)    amLODIPine (NORVASC) 5 mg tablet Take 1 Tablet by mouth daily.     meclizine (ANTIVERT) 25 mg tablet TK 1 T PO UP TO TID PRN (Patient not taking: Reported on 2/21/2022)    cetirizine (ZYRTEC) 10 mg tablet TK 1 T PO QD  Indications: seasonal runny nose (Patient not taking: Reported on 2/21/2022)    traZODone (DESYREL) 100 mg tablet TK 2 TS PO QHS    Trintellix 20 mg tablet TK 1 T PO QD    diclofenac (VOLTAREN) 1 % gel Apply  to affected area four (4) times daily.  pyridoxine, vitamin B6, (VITAMIN B-6) 25 mg tablet Take 1 Tab by mouth daily. (Patient not taking: Reported on 2/21/2022)    cyanocobalamin 1,000 mcg tablet Take 1 Tab by mouth daily. (Patient not taking: Reported on 1/27/2022)    ibuprofen (MOTRIN) 800 mg tablet Take 1 Tab by mouth every six (6) hours as needed for Pain.  ferrous sulfate (FEOSOL) 325 mg (65 mg iron) tablet Take  by mouth Daily (before breakfast). Takes 3 tabs daily  (Patient not taking: Reported on 2/21/2022)    busPIRone (BUSPAR) 5 mg tablet Take 15 mg by mouth three (3) times daily (with meals). Indications: taking 15 mg BID (Patient not taking: Reported on 3/7/2022)    gabapentin (NEURONTIN) 300 mg capsule Take 300 mg by mouth three (3) times daily. No current facility-administered medications for this visit. ROS    Objective:   No flowsheet data found.      [INSTRUCTIONS:  \"[x]\" Indicates a positive item  \"[]\" Indicates a negative item  -- DELETE ALL ITEMS NOT EXAMINED]    Constitutional: [x] Appears well-developed and well-nourished [x] No apparent distress      [] Abnormal -     Mental status: [x] Alert and awake  [x] Oriented to person/place/time [x] Able to follow commands    [] Abnormal -     Eyes:   EOM    [x]  Normal    [] Abnormal -   Sclera  [x]  Normal    [] Abnormal -          Discharge [x]  None visible   [] Abnormal -     HENT: [x] Normocephalic, atraumatic  [] Abnormal -   [x] Mouth/Throat: Mucous membranes are moist    External Ears [x] Normal  [] Abnormal -    Neck: [x] No visualized mass [] Abnormal -     Pulmonary/Chest: [x] Respiratory effort normal   [x] No visualized signs of difficulty breathing or respiratory distress        [] Abnormal -      Musculoskeletal:   [x] Normal gait with no signs of ataxia         [x] Normal range of motion of neck        [] Abnormal -     Neurological:        [x] No Facial Asymmetry (Cranial nerve 7 motor function) (limited exam due to video visit)          [x] No gaze palsy        [] Abnormal -          Skin:        [x] No significant exanthematous lesions or discoloration noted on facial skin         [] Abnormal -            Psychiatric:       [x] Normal Affect [] Abnormal -        [x] No Hallucinations    Other pertinent observable physical exam findings:-        We discussed the expected course, resolution and complications of the diagnosis(es) in detail. Medication risks, benefits, costs, interactions, and alternatives were discussed as indicated. I advised her to contact the office if her condition worsens, changes or fails to improve as anticipated. She expressed understanding with the diagnosis(es) and plan. Bre Reina, was evaluated through a synchronous (real-time) audio-video encounter. The patient (or guardian if applicable) is aware that this is a billable service, which includes applicable co-pays. Verbal consent to proceed has been obtained. The visit was conducted pursuant to the emergency declaration under the [facility], [address] [place] [address] authority and the [employer] [place] General Act. Patient identification was verified, and a caregiver was present when appropriate. The patient was located at home in a state where the provider was licensed to provide care.       Brianna Saunders NP

## 2022-04-26 ENCOUNTER — TRANSCRIBE ORDER (OUTPATIENT)
Dept: MAMMOGRAPHY | Age: 52
End: 2022-04-26

## 2022-04-26 DIAGNOSIS — Z12.31 VISIT FOR SCREENING MAMMOGRAM: Primary | ICD-10-CM

## 2022-05-12 ENCOUNTER — TELEPHONE (OUTPATIENT)
Dept: NEUROLOGY | Age: 52
End: 2022-05-12

## 2022-06-22 ENCOUNTER — HOSPITAL ENCOUNTER (OUTPATIENT)
Dept: PHYSICAL THERAPY | Age: 52
Discharge: HOME | End: 2022-06-22
Payer: MEDICAID

## 2022-06-22 PROCEDURE — 97162 PT EVAL MOD COMPLEX 30 MIN: CPT | Performed by: PHYSICAL THERAPIST

## 2022-06-22 NOTE — PROGRESS NOTES
PT INITIAL EVALUATION NOTE - [facility] 2-15    Patient Name: Drake Stephen  Date:2022  : 1970  [x]  Patient  Verified  Payor: [employer] MEDICAID / Plan: [place] [employer] / Product Type: Managed Care Medicaid /    In time: 8:15  Out time: 8:45  Total Treatment Time (min): 30  Total Timed Codes (min): 30  1:1 Treatment Time ( only): 30  Visit #: 1    Treatment Area: Left hand pain [M79.642]    SUBJECTIVE  Pain Level (0-10 scale): 0  Any medication changes, allergies to medications, adverse drug reactions, diagnosis change, or new procedure performed?: [] No    [x] Yes (see summary sheet for update)  Subjective:    Pt presents today with no pain but complains of weakness in the L hand. Difficulty with , pinch, twisting activity   PLOF: Working - typing a lot at work   Mechanism of Injury: insidious onset, repetitive typing   Previous Treatment/Compliance: Surgery 22  PMHx/Surgical Hx: Gloria Hilts Quervain's Disease L, MINDY CTS (L post - op, see below; R pre - op), endoscopic MINDY CT surgery 2019, Arthritis in the hand, asthma, breast cancer, depression, GERD, heart murmur, HTN, neuropathy  2022: L thumb CMC arthroplasty w Gloria Hilts Quervain's release APL interpositional tendon transfer, suspension plasty and revision left open carpal tunnel release with flexor tenosynovectomy   Work Hx:  at [employer]  Living Situation: at home, difficulty with ADL's - reaching, lifting, carrying  Pt Goals:  Increase strength in L hand  Barriers: chronicity   Motivation: Motivated  Substance use: None Reported  Cognition: A & O x 4        OBJECTIVE/EXAMINATION  C/S ROM:   Flexion: WNL  Extension: WNL   Lateral Flexion: WNL,   Rotation: WNL  Increase in sx: cervical extension, rotation & Lateral flexion R    A/PROM:   Active     Norms Left   Wrist Flex 0-80 60    Ext 0-70 30    Ulnar Dev 0-30 30    Radial Dev 0-20 10     Strength: MMT  Right Left   Elbow Ext/flex 4+/5 4/5   Forearm Supination 4/5 4/5    Pronation 4/5 4/5   Wrist Flex 4+/5 4/5    Ext 4+/5 3+/5    Ulnar Dev 4+/5 4/5    Radial Dev 4+/5 4/5         Hand Strength: Gross Grasp  Dynamometer   Right  36.0   Left 20.6     Thumb ROM:  CMC Extension: 50  CMC Abduction: 45    Special Test:  CMC Grind: neg  Finkelstein: pos  Median N tension test: pos  Phalen's: pos    Sensation: light touch  C5, C6 impaired  other UE dermatomes intact      Modality rationale: Pt declined    Min Type Additional Details    [] Estim: []Att   []Unatt        []TENS instruct                  []IFC  []Premod   []NMES                     []Other:  []w/US   []w/ice   []w/heat  Position:  Location:    []  Traction: [] Cervical       []Lumbar                       [] Prone          []Supine                       []Intermittent   []Continuous Lbs:  [] before manual  [] after manual  []w/heat    []  Ultrasound: []Continuous   [] Pulsed at:                           []1MHz   []3MHz Location:  W/cm2:    [] Paraffin         Location:   []w/heat    []  Ice     []  Heat  []  Ice massage Position:  Location:    []  Laser  []  Other: Position:  Location:      []  Vasopneumatic Device Pressure:       [] lo [] med [] hi   Temperature:      [x] Skin assessment post-treatment:  [x]intact []redness- no adverse reaction    []redness - adverse reaction:           With   [x] TE   [] TA   [] Neuro   [] SC   [] other: Patient Education: [x] Review HEP    [] Progressed/Changed HEP based on:   [] positioning   [] body mechanics   [] transfers   [] heat/ice application    [] other:      Other Objective/Functional Measures: FOTO Functional Measure: 31/100           Pain Level (0-10 scale) post treatment: 0    ASSESSMENT/Changes in Function:     [x]  See Plan of Care      Ebenezer Rankin, SPT 6/22/2022   Antonietta Sagastume PT , DPT, OCS, Cert.  DN   6/22/2022

## 2022-06-22 NOTE — PROGRESS NOTES
Physical Therapy at [facility],   a part of  [facility]  [address] [facility] [place] Hansa Reardon  Phone: [phone]  Fax: [phone]    Plan of Care/Statement of Necessity for Physical Therapy Services  2-15    Patient name: Demarcus Mckeon  : 1970  Provider#: [id number]  Referral source: Ewelina James MD      Medical/Treatment Diagnosis: Left hand pain [M79.642]     Prior Hospitalization: see medical history     Comorbidities: Nathanel Lamp Disease L, MINDY CTS (L post - op, see below; R pre - op), endoscopic MINDY CT surgery , Arthritis in the hand, asthma, breast cancer, depression, GERD, heart murmur, HTN, neuropathy  2022: L thumb CMC arthroplasty w Javy Fields Quervain's release APL interpositional tendon transfer, suspension plasty and revision left open carpal tunnel release with flexor tenosynovectomy   Prior Level of Function: Working - able to type   Medications: Verified on Patient Summary List  Start of Care: 22     Onset Date: 2022  The Plan of Care and following information is based on the information from the initial evaluation. Assessment/ key information:   Pt presents to PT today with weakness in the L UE, specifically the wrist & hand. She reports weakness is secondary to procedure on 22 (see above). Pt demonstrates numbness and tingling in the Median N distribution as well as De Quervain's tenosynovitis. Global weakness and decreased ROM on the L side. The pt denies any pain at this time. Evaluation Complexity History HIGH Complexity :3+ comorbidities / personal factors will impact the outcome/ POC ; Examination MEDIUM Complexity : 3 Standardized tests and measures addressing body structure, function, activity limitation and / or participation in recreation  ;Presentation MEDIUM Complexity : Evolving with changing characteristics  ; Clinical Decision Making MEDIUM Complexity : FOTO score of 26-74  Overall Complexity Rating: MEDIUM    Problem List: decrease ROM, decrease strength, decrease activity tolerance and decrease flexibility/ joint mobility   Treatment Plan may include any combination of the following: Therapeutic exercise, Therapeutic activities, Neuromuscular re-education, Physical agent/modality, Manual therapy, Patient education and Self Care training  Patient / Family readiness to learn indicated by: asking questions, trying to perform skills and interest  Persons(s) to be included in education: patient (P)  Barriers to Learning/Limitations: None  Patient Goal (s): I want to increase strength in the L hand.   Patient Self Reported Health Status: good  Rehabilitation Potential: good    Short Term Goals: To be accomplished in 4 weeks:  1. In 4 weeks, pt will be able to carry 3 pounds in her L hand without losing  on the object. 2. In 4 weeks, pt will be able to  the steering wheel of the car with <2/10 increase in pain and without reported difficulty. 3. In 4 weeks, pt will increase muscle strength in LE to 4+/5 on the L hand to ease tying shoes. Long Term Goals: To be accomplished in 12 weeks:  1. In 12 weeks, pt will be able to use hands to push up when standing up from standing on a low surface. 2. In 12 weeks, pt will be able to carry 8 pounds in her L hand without losing . 3. In 12 weeks, pt will be able to twist the lid off of a jar or bottle to ease cooking. Frequency / Duration: Patient to be seen 2  times per week for 12 weeks. Patient/ Caregiver education and instruction: exercises    [x]  Plan of care has been reviewed with PTA        Certification Period: 6/22/2022 - 9/22/2022  Elio Hernandez, SPT 6/22/2022   Maria L Garcias PT , DPT, OCS, Cert. DN   6/22/2022     ________________________________________________________________________    I certify that the above Therapy Services are being furnished while the patient is under my care.  I agree with the treatment plan and certify that this therapy is necessary.     Physician's Signature:____________________  Date:____________Time: _________         Cong Douglas MD

## 2022-07-14 ENCOUNTER — HOSPITAL ENCOUNTER (OUTPATIENT)
Dept: PHYSICAL THERAPY | Age: 52
Discharge: HOME | End: 2022-07-14
Payer: MEDICAID

## 2022-07-14 PROCEDURE — 97110 THERAPEUTIC EXERCISES: CPT | Performed by: PHYSICAL MEDICINE & REHABILITATION

## 2022-07-14 NOTE — PROGRESS NOTES
PT DAILY TREATMENT NOTE - [facility] -15    Patient Name: Waldemar Purcell  Date:2022  : 1970  [x]  Patient  Verified  Payor: BLUE CROSS MEDICAID / Plan: [facility] [employer] HE[employer]KEEPERS PLUS / Product Type: Managed Care Medicaid /    In time:1130a  Out time:1200a  Total Treatment Time (min): 30  Total Timed Codes (min): 30  1:1 Treatment Time ( only): 30   Visit #: 2      Treatment Area: Left hand pain [M79.642]    SUBJECTIVE  Pain Level (0-10 scale): 0/10  Any medication changes, allergies to medications, adverse drug reactions, diagnosis change, or new procedure performed?: [x] No    [] Yes (see summary sheet for update)  Subjective functional status/changes:   [] No changes reported  Patient reported her hand just feels weak. OBJECTIVE    30 min Therapeutic Exercise:  [x] See flow sheet :   Rationale: increase ROM, increase strength and improve coordination to improve the patients ability to ADLs,  and lifting tolerance          With   [x] TE   [] TA   [] neuro   [] other: Patient Education: [x] Review HEP    [] Progressed/Changed HEP based on:   [] positioning   [] body mechanics   [] transfers   [] heat/ice application    [] other:      Other Objective/Functional Measures:   Good thumb mobility present with adduction being the most difficult. Pain Level (0-10 scale) post treatment: 0/10    ASSESSMENT/Changes in Function:     Patient will continue to benefit from skilled PT services to modify and progress therapeutic interventions, address functional mobility deficits, address ROM deficits, address strength deficits, analyze and address soft tissue restrictions, analyze and cue movement patterns, analyze and modify body mechanics/ergonomics and assess and modify postural abnormalities to attain remaining goals.      []  See Plan of Care  []  See progress note/recertification  []  See Discharge Summary         Progress towards goals / Updated goals:  Updated HEP    PLAN  [x]  Upgrade activities as tolerated     [x]  Continue plan of care  [x]  Update interventions per flow sheet       []  Discharge due to:_  []  Other:_      Osiel Martini PTA, OPTA, CPT  7/14/2022

## 2022-07-20 ENCOUNTER — VIRTUAL VISIT (OUTPATIENT)
Dept: NEUROLOGY | Age: 52
End: 2022-07-20
Payer: MEDICAID

## 2022-07-20 DIAGNOSIS — G56.03 BILATERAL CARPAL TUNNEL SYNDROME: Primary | ICD-10-CM

## 2022-07-20 PROCEDURE — 99214 OFFICE O/P EST MOD 30 MIN: CPT | Performed by: NURSE PRACTITIONER

## 2022-07-20 NOTE — LETTER
7/20/2022 1:58 PM    Ms. Rolan Chauhan  [address] [facility] [address]      Rolan Chauhan was under the care of [address] [place] [address] [facility]. Her anticipated return to work will be no sooner than September 25th, 2022 (09/25/2022). If there are questions or concerns please have the patient contact our office.           Sincerely,      Lia Santos NP

## 2022-07-20 NOTE — PROGRESS NOTES
[address] [facility],5Th [address]  [address]   [place] [address] [place], [place] [address]H[place]a    [phone] Office   [phone] Fax           Date:  22     Name:  Kaya Henning  :  1970  MRN:  [id number]     PCP:  Cate Parmar NP    Salina Mkceon is a 45 y.o. female who was seen by synchronous (real-time) audio-video technology on 2022 for Follow-up (CTS)    Subjective:   She had the gastric sleeve done at the end of  and she has lost some weight. The left hand is still not quite up to par and it is still very stiff and weak. She is still doing physical therapy twice a week. As such, she has not had the right release. Her surgeon would have been doing this about now but is holding off until her function for the left improves. Currently she is still out of work and has not returned. She does have disability insurance through her work but this was denied and she had to get a disability  to help with getting this decision over turned. She does have an individual policy that she has been able to use. She would like to try to go back to work after her therapy is done. This will be in about another six weeks and then it may still pending whether or not she is going to have the second surgery. Recap from LOV:  Bilateral CTS, status post left CTS release with pending PT. As this will be at least a six week therapy plan and then she is to have the right CTS release, will plan to remain out of work until at least 2022. I will plan to see her in follow up about a week prior to this. Current Outpatient Medications   Medication Sig    Latuda 20 mg tab tablet     omeprazole (PRILOSEC) 20 mg capsule Take 1 Capsule by mouth daily. amLODIPine (NORVASC) 5 mg tablet Take 1 Tablet by mouth daily.     meclizine (ANTIVERT) 25 mg tablet TK 1 T PO UP TO TID PRN    cetirizine (ZYRTEC) 10 mg tablet TK 1 T PO QD Indications: seasonal runny nose    traZODone (DESYREL) 100 mg tablet TK 2 TS PO QHS    Trintellix 20 mg tablet TK 1 T PO QD    diclofenac (VOLTAREN) 1 % gel Apply  to affected area four (4) times daily. pyridoxine, vitamin B6, (VITAMIN B-6) 25 mg tablet Take 1 Tab by mouth daily. cyanocobalamin 1,000 mcg tablet Take 1 Tab by mouth daily. ibuprofen (MOTRIN) 800 mg tablet Take 1 Tab by mouth every six (6) hours as needed for Pain. ferrous sulfate (FEOSOL) 325 mg (65 mg iron) tablet Take  by mouth Daily (before breakfast). Takes 3 tabs daily    busPIRone (BUSPAR) 5 mg tablet Take 15 mg by mouth three (3) times daily (with meals). Indications: taking 15 mg BID    gabapentin (NEURONTIN) 300 mg capsule Take 300 mg by mouth three (3) times daily. No current facility-administered medications for this visit. Allergies   Allergen Reactions    Latex Other (comments)     Rash, skin breakdown    Adhesive Tape-Silicones Rash and Itching    Darvocet A500 [Propoxyphene N-Acetaminophen] Nausea Only and Other (comments)     headache    Diazepam Other (comments)     Not an allergy, just does not work for her at all.     Hydrocodone Nausea and Vomiting and Vertigo    Percocet [Oxycodone-Acetaminophen] Nausea Only and Other (comments)     headache    Tylenol-Codeine #2 Itching, Nausea Only and Other (comments)     HEADACHES        Past Medical History:   Diagnosis Date    Arthritis     HANDS AND KNEES    Asthma     Breast cancer ([facility] [address] 03/31/2017    Carcinoma in situ    Cancer ([facility] [address] 2016    RIGHT breast cancer    Depression     GERD (gastroesophageal reflux disease)     Heart murmur     Hypertension     Neuropathy     PUD (peptic ulcer disease)     S/P radiation therapy 2016    and chemo finished all 1/2017    Vertigo      Past Surgical History:   Procedure Laterality Date    HAND/FINGER SURGERY UNLISTED Bilateral 2020    bilateral endoscopic carpal tunnel releases     HX BREAST BIOPSY Right     SEVERAL TIMES    HX BREAST LUMPECTOMY Right 3/31/2016    RIGHT BREAST LUMPECTOMY, RIGHT SENTINEL NODE BIOPSY WITH ULTRASOUND performed by Nilsa Cano MD at [facility] 11    HX CHOLECYSTECTOMY      HX GI      COLONOSCOPY    HX GYN      cesarian x 3    HX HYSTERECTOMY      Still has ovaries    HX ORTHOPAEDIC Left     ARM FX WITH PINS    HX TONSILLECTOMY      HX UROLOGICAL      CYSTOSCOPY    HX UROLOGICAL      BLADDER SLING    KAREN STEREO  BX BREAST RT 1ST LESION W/CLIP AND SPECIMEN Right 2013    benign    KAREN US BX BREAST RT 1ST LESION W/CLIP AND SPECIMEN Right 01/29/2016    Ca in situ    AR BREAST SURGERY PROCEDURE UNLISTED Right 5/17/16    RIGHT axillary lymph node dissection and PAC insertion      reports that she has never smoked. She has never used smokeless tobacco. She reports current alcohol use. She reports that she does not use drugs. family history includes Arthritis-rheumatoid in her paternal aunt; Asthma in her daughter and son; Heart Disease in her mother; Hypertension in her mother; Kidney Disease in her mother; No Known Problems in her father and son. ROS    Objective:   No flowsheet data found. General:  Well defined, nourished, and groomed individual in no acute distress. Psych:  Good mood and bright affect    NEUROLOGICAL EXAMINATION:     Mental Status:   Alert and oriented to person, place, and time with recent and remote memory intact. Attention span and concentration are normal. Speech is fluent with a full fund of knowledge.       Cranial Nerves:  I: smell Not tested   II: visual fields Not assessed   II: pupils Equal, round, reactive to light   II: optic disc Not assessed   III,VII: ptosis none   III,IV,VI: extraocular muscles  Full ROM   V: mastication normal   V: facial light touch sensation  Not assessed   VII: facial muscle function   symmetric   VIII: hearing symmetric   IX: soft palate elevation  normal   XI: trapezius strength  Not assessed   XI: sternocleidomastoid strength Not assessed   XI: neck flexion strength  Not assessed   XII: tongue  midline     Motor Examination: Normal tone and bulk. Strength was not assessed      Sensory exam:  Not assessed     Coordination:  No resting or intention tremor    Gait and Station:  Steady while walking. No muscle wasting or fasiculations noted. Reflexes:  Not assessed    Assessment & Plan:       ICD-10-CM ICD-9-CM    1. Bilateral carpal tunnel syndrome  G56.03 354.0           Plan to return to work after therapy which will be no sooner than September 25, 2022. When she does return to work, she will need an accomodation to allow her to have voice recognition software for dictation. At this point, the CTS release on her right has been postponed due to delayed recovery from the left sided procedure. She will plan to see Dr. Peri Borrero after the completion of PT in September. Follow up in eight weeks. I spent at least 30 minutes on this visit with this established patient. We discussed the expected course, resolution and complications of the diagnosis(es) in detail. Medication risks, benefits, costs, interactions, and alternatives were discussed as indicated. I advised her to contact the office if her condition worsens, changes or fails to improve as anticipated. She expressed understanding with the diagnosis(es) and plan. Beronica Bray, was evaluated through a synchronous (real-time) audio-video encounter. The patient (or guardian if applicable) is aware that this is a billable service, which includes applicable co-pays. This Virtual Visit was conducted with patient's (and/or legal guardian's) consent. The visit was conducted pursuant to the emergency declaration under the [address] [place] [address], [address] [place] [address] authority and the [employer] General Act. Patient identification was verified, and a caregiver was present when appropriate.   The patient was located at: Home: [address] [place] [id number]  The provider was located at: Home: [address] [place] [address],

## 2022-07-21 ENCOUNTER — APPOINTMENT (OUTPATIENT)
Dept: PHYSICAL THERAPY | Age: 52
End: 2022-07-21
Payer: MEDICAID

## 2022-07-25 ENCOUNTER — HOSPITAL ENCOUNTER (OUTPATIENT)
Dept: PHYSICAL THERAPY | Age: 52
Discharge: HOME | End: 2022-07-25
Payer: MEDICAID

## 2022-07-25 PROCEDURE — 97110 THERAPEUTIC EXERCISES: CPT | Performed by: PHYSICAL MEDICINE & REHABILITATION

## 2022-07-25 NOTE — PROGRESS NOTES
PT DAILY TREATMENT NOTE - [facility] 2-15    Patient Name: Jose Johnson  Date:2022  : 1970  [x]  Patient  Verified  Payor: BLUE CROSS MEDICAID / Plan: [facility] [employer] HEALTHKEEPERS PLUS / Product Type: Managed Care Medicaid /    In time:550p  Out time:620p  Total Treatment Time (min): 30  Total Timed Codes (min): 30  1:1 Treatment Time ( only): 30   Visit #: 3      Treatment Area: Left hand pain [M79.642]    SUBJECTIVE  Pain Level (0-10 scale): 0/10  Any medication changes, allergies to medications, adverse drug reactions, diagnosis change, or new procedure performed?: [x] No    [] Yes (see summary sheet for update)  Subjective functional status/changes:   [] No changes reported  Patient reported her top of the wrist has been cracking more. OBJECTIVE    30 min Therapeutic Exercise:  [x] See flow sheet :   Rationale: increase ROM, increase strength and improve coordination to improve the patients ability to ADLs,  and lifting tolerance          With   [x] TE   [] TA   [] neuro   [] other: Patient Education: [x] Review HEP    [] Progressed/Changed HEP based on:   [] positioning   [] body mechanics   [] transfers   [] heat/ice application    [] other:      Other Objective/Functional Measures: Mod fatigue with today's new exercises. Pain Level (0-10 scale) post treatment: 0/10    ASSESSMENT/Changes in Function:     Patient will continue to benefit from skilled PT services to modify and progress therapeutic interventions, address functional mobility deficits, address ROM deficits, address strength deficits, analyze and address soft tissue restrictions, analyze and cue movement patterns, analyze and modify body mechanics/ergonomics and assess and modify postural abnormalities to attain remaining goals. []  See Plan of Care  []  See progress note/recertification  []  See Discharge Summary         Progress towards goals / Updated goals: Will continue to work on gripping tolerance. PLAN  [x]  Upgrade activities as tolerated     [x]  Continue plan of care  [x]  Update interventions per flow sheet       []  Discharge due to:_  []  Other:_      Isabela Hinders PTA, OPTA, CPT  7/25/2022

## 2022-08-01 ENCOUNTER — HOSPITAL ENCOUNTER (OUTPATIENT)
Dept: PHYSICAL THERAPY | Age: 52
Discharge: HOME | End: 2022-08-01

## 2022-08-19 DIAGNOSIS — G56.03 BILATERAL CARPAL TUNNEL SYNDROME: ICD-10-CM

## 2022-08-19 DIAGNOSIS — M18.12 PRIMARY OSTEOARTHRITIS OF FIRST CARPOMETACARPAL JOINT OF LEFT HAND: Primary | ICD-10-CM

## 2022-08-19 DIAGNOSIS — Z98.890 STATUS POST CARPAL TUNNEL RELEASE OF BOTH WRISTS: ICD-10-CM

## 2022-08-19 DIAGNOSIS — M65.4 DE QUERVAIN'S TENOSYNOVITIS, LEFT: ICD-10-CM

## 2022-08-23 ENCOUNTER — DOCUMENTATION ONLY (OUTPATIENT)
Dept: FAMILY MEDICINE CLINIC | Age: 52
End: 2022-08-23

## 2022-09-28 NOTE — PROGRESS NOTES
Physical Therapy at [facility],   a part of  [facility]  [address] [facility] Hansa Reardon  Phone: [phone]  Fax: [phone]    Discharge Summary  2-15    Patient name: Sarabjit Martínez  : 1970  Provider#: [id number]  Referral source: Ramakrishna Rosas MD      Medical/Treatment Diagnosis: Left hand pain [M79.642]     Prior Hospitalization: see medical history     Comorbidities: See Plan of Care  Prior Level of Function:See Plan of Care  Medications: Verified on Patient Summary List    Start of Care: 22      Onset Date:2022   Visits from Start of Care: 3     Missed Visits: 3 cancellations, 3 no shows  Reporting Period : 22 to 22      ASSESSMENT/SUMMARY OF CARE: The patient was seen for 3 PT visits over the course of 4 weeks with a focus on improving strength and ROM through the L wrist and hand. She was unable to regularly attend visits with frequent no show's and cancellations, but on a recent f/u the patient reported that she had achieved all long term goals. Short Term Goals: To be accomplished in 4 weeks:  1. In 4 weeks, pt will be able to carry 3 pounds in her L hand without losing  on the object. Met.  2. In 4 weeks, pt will be able to  the steering wheel of the car with <2/10 increase in pain and without reported difficulty. Met.  3. In 4 weeks, pt will increase muscle strength in LE to 4+/5 on the L hand to ease tying shoes. Met. Long Term Goals: To be accomplished in 12 weeks:  1. In 12 weeks, pt will be able to use hands to push up when standing up from standing on a low surface. Met.  2. In 12 weeks, pt will be able to carry 8 pounds in her L hand without losing . Met.  3. In 12 weeks, pt will be able to twist the lid off of a jar or bottle to ease cooking. Met.         RECOMMENDATIONS:  [x]Discontinue therapy: [x]Patient has reached or is progressing toward set goals      []Patient is non-compliant or has abdicated      []Due to lack of appreciable progress towards set goals      []Other    Downey Murders, PT 9/28/2022

## 2022-10-05 ENCOUNTER — HOSPITAL ENCOUNTER (OUTPATIENT)
Dept: MAMMOGRAPHY | Age: 52
Discharge: HOME | End: 2022-10-05
Attending: INTERNAL MEDICINE
Payer: COMMERCIAL

## 2022-10-05 DIAGNOSIS — Z12.31 VISIT FOR SCREENING MAMMOGRAM: ICD-10-CM

## 2022-10-05 PROCEDURE — 77063 BREAST TOMOSYNTHESIS BI: CPT

## 2022-10-14 ENCOUNTER — TELEPHONE (OUTPATIENT)
Dept: NEUROLOGY | Age: 52
End: 2022-10-14

## 2022-10-14 NOTE — TELEPHONE ENCOUNTER
Patient would like a call from the nurse regarding her disability paperwork. She said it is very urgent that this paperwork is filled out and completed as soon as possible.       Please contact

## 2022-10-26 ENCOUNTER — VIRTUAL VISIT (OUTPATIENT)
Dept: NEUROLOGY | Age: 52
End: 2022-10-26
Payer: COMMERCIAL

## 2022-10-26 DIAGNOSIS — G56.03 BILATERAL CARPAL TUNNEL SYNDROME: Primary | ICD-10-CM

## 2022-10-26 NOTE — PROGRESS NOTES
[address] [facility],5Th [address]  [address]   [place] [address] [place] [address] Drive   [phone] Office   [phone] Fax           Date:  10/26/22     Name:  Jessica Rdz  :  1970  MRN:  [id number]     PCP:  Pina Monsalve, TOSHIA Santoyoelia Young is a 47 y.o. female who was seen by synchronous (real-time) audio-video technology on 10/26/2022 for Follow-up (CTS)    Subjective:   She is finished with PT now. The wrist does not hurt but the back of the hand and finger joints are hurting still. The hand will lock up on her. This has worsened since she has resumed work. Thinks that she will need the letter for accommodation to have voice recognition software. Since the hand is still bothering her, she is holding off on the second surgery. Recap from [address] [place] [address]:  Plan to return to work after therapy which will be no sooner than 2022. When she does return to work, she will need an accomodation to allow her to have voice recognition software for dictation. At this point, the CTS release on her right has been postponed due to delayed recovery from the left sided procedure. She will plan to see Dr. Lilian Mendoza after the completion of PT in September. Follow up in eight weeks. Current Outpatient Medications   Medication Sig    Latuda 20 mg tab tablet     omeprazole (PRILOSEC) 20 mg capsule Take 1 Capsule by mouth daily. amLODIPine (NORVASC) 5 mg tablet Take 1 Tablet by mouth daily. meclizine (ANTIVERT) 25 mg tablet TK 1 T PO UP TO TID PRN    cetirizine (ZYRTEC) 10 mg tablet TK 1 T PO QD  Indications: seasonal runny nose    traZODone (DESYREL) 100 mg tablet TK 2 TS PO QHS    Trintellix 20 mg tablet TK 1 T PO QD    diclofenac (VOLTAREN) 1 % gel Apply  to affected area four (4) times daily. pyridoxine, vitamin B6, (VITAMIN B-6) 25 mg tablet Take 1 Tab by mouth daily.     cyanocobalamin 1,000 mcg tablet Take 1 Tab by mouth daily.    ibuprofen (MOTRIN) 800 mg tablet Take 1 Tab by mouth every six (6) hours as needed for Pain. ferrous sulfate (FEOSOL) 325 mg (65 mg iron) tablet Take  by mouth Daily (before breakfast). Takes 3 tabs daily    busPIRone (BUSPAR) 5 mg tablet Take 15 mg by mouth three (3) times daily (with meals). Indications: taking 15 mg BID    gabapentin (NEURONTIN) 300 mg capsule Take 300 mg by mouth three (3) times daily. No current facility-administered medications for this visit. Allergies   Allergen Reactions    Latex Other (comments)     Rash, skin breakdown    Adhesive Tape-Silicones Rash and Itching    Darvocet A500 [Propoxyphene N-Acetaminophen] Nausea Only and Other (comments)     headache    Diazepam Other (comments)     Not an allergy, just does not work for her at all.     Hydrocodone Nausea and Vomiting and Vertigo    Percocet [Oxycodone-Acetaminophen] Nausea Only and Other (comments)     headache    Tylenol-Codeine #2 Itching, Nausea Only and Other (comments)     HEADACHES        Past Medical History:   Diagnosis Date    Arthritis     HANDS AND KNEES    Asthma     Breast cancer ([facility] [address] 03/31/2017    Carcinoma in situ    Cancer ([facility] [address] 2016    RIGHT breast cancer    Depression     GERD (gastroesophageal reflux disease)     Heart murmur     Hypertension     Neuropathy     PUD (peptic ulcer disease)     S/P radiation therapy 2016    and chemo finished all 1/2017    Vertigo      Past Surgical History:   Procedure Laterality Date    HAND/FINGER SURGERY UNLISTED Bilateral 2020    bilateral endoscopic carpal tunnel releases     HX BREAST BIOPSY Right     SEVERAL TIMES    HX BREAST LUMPECTOMY Right 3/31/2016    RIGHT BREAST LUMPECTOMY, RIGHT SENTINEL NODE BIOPSY WITH ULTRASOUND performed by Andrew Troncoso MD at [facility] 11    HX CHOLECYSTECTOMY      HX GI      COLONOSCOPY    HX GYN      cesarian x 3    HX HYSTERECTOMY      Still has ovaries    HX ORTHOPAEDIC Left     ARM FX WITH PINS    HX TONSILLECTOMY      HX UROLOGICAL      CYSTOSCOPY    HX UROLOGICAL      BLADDER SLING    KAREN STEREO  BX BREAST RT 1ST LESION W/CLIP AND SPECIMEN Right 2013    benign    KAREN US BX BREAST RT 1ST LESION W/CLIP AND SPECIMEN Right 01/29/2016    Ca in situ    RI BREAST SURGERY PROCEDURE UNLISTED Right 5/17/16    RIGHT axillary lymph node dissection and PAC insertion      reports that she has never smoked. She has never used smokeless tobacco. She reports current alcohol use. She reports that she does not use drugs. family history includes Arthritis-rheumatoid in her paternal aunt; Asthma in her daughter and son; Heart Disease in her mother; Hypertension in her mother; Kidney Disease in her mother; No Known Problems in her father and son. ROS    Objective:   No flowsheet data found. General:  Well defined, nourished, and groomed individual in no acute distress. Psych:  Good mood and bright affect    NEUROLOGICAL EXAMINATION:     Mental Status:   Alert and oriented to person, place, and time with recent and remote memory intact. Attention span and concentration are normal. Speech is fluent with a full fund of knowledge. Cranial Nerves:  I: smell Not tested   II: visual fields Not assessed   II: pupils Equal, round, reactive to light   II: optic disc Not assessed   III,VII: ptosis none   III,IV,VI: extraocular muscles  Full ROM   V: mastication normal   V: facial light touch sensation  Not assessed   VII: facial muscle function   symmetric   VIII: hearing symmetric   IX: soft palate elevation  normal   XI: trapezius strength  Not assessed   XI: sternocleidomastoid strength Not assessed   XI: neck flexion strength  Not assessed   XII: tongue  midline     Motor Examination: Normal tone and bulk. Strength was not assessed      Sensory exam:  Not assessed     Coordination:  No resting or intention tremor    Gait and Station:  Steady while walking. No muscle wasting or fasiculations noted.       Reflexes:  Not assessed    Assessment & Plan:       ICD-10-CM ICD-9-CM    1. Bilateral carpal tunnel syndrome  G56.03 354.0         Due to ongoing stiffness and joint pain in her left hand, she is holding off on doing the right hand for now. She will need some work accommodation due to the disability associated with the surgery and ongoing CTS in the right. Will plan to have her out of work for at least 8 weeks after the second surgery. She will let me know when she will need this. I spent at least 30 minutes on this visit with this established patient. We discussed the expected course, resolution and complications of the diagnosis(es) in detail. Medication risks, benefits, costs, interactions, and alternatives were discussed as indicated. I advised her to contact the office if her condition worsens, changes or fails to improve as anticipated. She expressed understanding with the diagnosis(es) and plan. Marshall Bowens, was evaluated through a synchronous (real-time) audio-video encounter. The patient (or guardian if applicable) is aware that this is a billable service, which includes applicable co-pays. This Virtual Visit was conducted with patient's (and/or legal guardian's) consent. The visit was conducted pursuant to the emergency declaration under the [facility] [address] authority and the [employer] Act. Patient identification was verified, and a caregiver was present when appropriate.   The patient was located at: Home: [address] [place] [id number]  The provider was located at: Home: [address] [facility]

## 2022-10-31 NOTE — PROGRESS NOTES
HPI: Diego Zimmerman (: 1970) is a 47 y.o. female, patient, here for evaluation of the following chief complaint(s):    Surgical Follow-up (Left thumb CMC joint arthroplasty, open carpal tunnel release with flexor tenosynovectomy and de Quervain's release on 22.)  Patient is seen once again to evaluate her hands. She had undergone bilateral wrist endoscopic carpal tunnel surgery on 2019. She feels she has been experiencing similar symptoms on both sides. She did have an EMG on 10/21/2021 that really only showed the residual of prior carpal tunnel syndrome although she still feels much more symptomatic on the left than the right side. She admits to a component of left thumb basal joint arthritic pain worsened with , pinch and twist turning motions. She has had documented de Quervain's tenosynovitis and has been performing stretching exercises without long-term relief. She underwent a left thumb CMC arthroplasty with de Quervain's release, APL interpositional tendon transfer, suspension plasty and revision left open carpal tunnel release with flexor tenosynovectomy on 2022. He recovered very well from that surgery but has had increasing left thumb MP instability with hyperextension approaching 80 degrees. This is reportedly effecting the dorsal ulnar side of her left hand during pinch and  activities. Vitals:  LMP 2014 (Approximate)    There is no height or weight on file to calculate BMI. Allergies   Allergen Reactions    Latex Other (comments)     Rash, skin breakdown    Adhesive Tape-Silicones Rash and Itching    Darvocet A500 [Propoxyphene N-Acetaminophen] Nausea Only and Other (comments)     headache    Diazepam Other (comments)     Not an allergy, just does not work for her at all.     Hydrocodone Nausea and Vomiting and Vertigo    Percocet [Oxycodone-Acetaminophen] Nausea Only and Other (comments)     headache    Tylenol-Codeine #2 Itching, Nausea Only and Other (comments)     HEADACHES         Current Outpatient Medications   Medication Sig    Latuda 20 mg tab tablet     omeprazole (PRILOSEC) 20 mg capsule Take 1 Capsule by mouth daily. amLODIPine (NORVASC) 5 mg tablet Take 1 Tablet by mouth daily. meclizine (ANTIVERT) 25 mg tablet TK 1 T PO UP TO TID PRN    cetirizine (ZYRTEC) 10 mg tablet TK 1 T PO QD  Indications: seasonal runny nose    traZODone (DESYREL) 100 mg tablet TK 2 TS PO QHS    Trintellix 20 mg tablet TK 1 T PO QD    diclofenac (VOLTAREN) 1 % gel Apply  to affected area four (4) times daily. pyridoxine, vitamin B6, (VITAMIN B-6) 25 mg tablet Take 1 Tab by mouth daily. cyanocobalamin 1,000 mcg tablet Take 1 Tab by mouth daily. ibuprofen (MOTRIN) 800 mg tablet Take 1 Tab by mouth every six (6) hours as needed for Pain. ferrous sulfate (FEOSOL) 325 mg (65 mg iron) tablet Take  by mouth Daily (before breakfast). Takes 3 tabs daily    busPIRone (BUSPAR) 5 mg tablet Take 15 mg by mouth three (3) times daily (with meals). Indications: taking 15 mg BID    gabapentin (NEURONTIN) 300 mg capsule Take 300 mg by mouth three (3) times daily. No current facility-administered medications for this visit.        Past Medical History:   Diagnosis Date    Arthritis     HANDS AND KNEES    Asthma     Breast cancer ([facility] [address] 03/31/2017    Carcinoma in situ    Cancer ([facility] [address] 2016    RIGHT breast cancer    Depression     GERD (gastroesophageal reflux disease)     Heart murmur     Hypertension     Neuropathy     PUD (peptic ulcer disease)     S/P radiation therapy 2016    and chemo finished all 1/2017    Vertigo         Past Surgical History:   Procedure Laterality Date    HAND/FINGER SURGERY UNLISTED Bilateral 2020    bilateral endoscopic carpal tunnel releases     HX BREAST BIOPSY Right     SEVERAL TIMES    HX BREAST LUMPECTOMY Right 3/31/2016    RIGHT BREAST LUMPECTOMY, RIGHT SENTINEL NODE BIOPSY WITH ULTRASOUND performed by Troy Sheriff MD at [address] [place] [address] AMBULATORY OR    HX CHOLECYSTECTOMY      HX GI      COLONOSCOPY    HX GYN      cesarian x 3    HX HYSTERECTOMY      Still has ovaries    HX ORTHOPAEDIC Left     ARM FX WITH PINS    HX TONSILLECTOMY      HX UROLOGICAL      CYSTOSCOPY    HX UROLOGICAL      BLADDER SLING    KAREN STEREO  BX BREAST RT 1ST LESION W/CLIP AND SPECIMEN Right 2013    benign    KAREN US BX BREAST RT 1ST LESION W/CLIP AND SPECIMEN Right 01/29/2016    Ca in situ    [facility] BREAST SURGERY PROCEDURE UNLISTED Right 5/17/16    RIGHT axillary lymph node dissection and PAC insertion       Family History   Problem Relation Age of Onset    Hypertension Mother     Kidney Disease Mother         ON DIALYSIS    Heart Disease Mother     No Known Problems Father     Asthma Son     Asthma Daughter     No Known Problems Son     Arthritis-rheumatoid Paternal Aunt     Anesth Problems Neg Hx         Social History     Tobacco Use    Smoking status: Never    Smokeless tobacco: Never    Tobacco comments:      smokes   Vaping Use    Vaping Use: Never used   Substance Use Topics    Alcohol use: Yes     Alcohol/week: 0.0 standard drinks     Comment: occasionally a glass of wine    Drug use: No        Review of Systems   All other systems reviewed and are negative. Physical Exam    The left thumb and carpal tunnel wounds are well-healed. There is no redness drainage or sign of infection. Good thumb MP joint hyperextends just more than 80 degrees. When she pinches it starts to collapse and she feels this may be a  for pain to the dorsal ulnar side of her hand were no further abnormality had been noted. Imaging:    XR Results (most recent):  Results from Appointment encounter on 11/01/22    XR THUMB LT MIN 2 V    Narrative  AP, lateral and oblique x-ray of the left hand shows good post trapeziectomy space in height with good thumb metacarpal alignment and no change of the buttons. There is narrowing of the DIP joint spaces.   There is some hyperextension of the thumb MP joint noted of about 30 degrees       ASSESSMENT/PLAN:  Below is the assessment and plan developed based on review of pertinent history, physical exam, labs, studies, and medications. Patient underwent bilateral scopic carpal tunnel surgery in 2019 and now feels there is recurrence yet the EMG from October did not show any advanced findings. She seems to be more clinically symptomatic complaining of \"locking up\", \"dropping things\". She also describes a history of neuropathy. She has advanced thumb CMC basal joint arthritis on the left side with de Quervain's tenosynovitis. She was offered but deferred conservative treatment including injections and prefers to consider operative management. I offered her the option to undergo a left thumb CMC arthroplasty with de Quervain's release to utilize the APL as an interpositional tendon graft. At the same time she can undergo revision open carpal tunnel release with flexor tenosynovectomy. She indeed underwent a left thumb CMC arthroplasty with de Quervain's release, APL interpositional tendon transfer and revision left carpal tunnel release with flexor tenosynovectomy on 1/18/2022. She recovered very well from that surgery but has notable increase instability of the left thumb metacarpal phalangeal joint upwards of hyperextension of 80 degrees. When she pinches her  she tends to see collapse of the thumb at the MP joint and feels this may be a  for discomfort to the dorsal ulnar hand or no further abnormality was detected. I reviewed treatment options and answered questions. She deferred simulating a fusion but I did explain to her in detail the procedure to stabilize the thumb MP joint and mild degree of flexion.   I reviewed risks that include but not limited to stiffness, pain, nerve or tendon damage, nonunion, malunion the possible need for delayed hardware removal.  Arrangements can be made for this to be performed on an outpatient basis at her convenience. 1. Primary osteoarthritis of first carpometacarpal joint of left hand  -     XR THUMB LT MIN 2 V; Future  2. Bilateral carpal tunnel syndrome  3. Status post carpal tunnel release of both wrists  4. De Quervain's tenosynovitis, left  5. Bilateral hand pain  6. 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 .    An electronic signature was used to authenticate this note.   -- Mariaelena Pittman MD

## 2022-11-01 ENCOUNTER — OFFICE VISIT (OUTPATIENT)
Dept: ORTHOPEDIC SURGERY | Age: 52
End: 2022-11-01
Payer: COMMERCIAL

## 2022-11-01 DIAGNOSIS — M25.342 CHRONIC INSTABILITY OF METACARPOPHALANGEAL JOINT OF LEFT THUMB: ICD-10-CM

## 2022-11-01 DIAGNOSIS — M79.641 BILATERAL HAND PAIN: ICD-10-CM

## 2022-11-01 DIAGNOSIS — M65.4 DE QUERVAIN'S TENOSYNOVITIS, LEFT: ICD-10-CM

## 2022-11-01 DIAGNOSIS — G56.03 BILATERAL CARPAL TUNNEL SYNDROME: ICD-10-CM

## 2022-11-01 DIAGNOSIS — M79.642 BILATERAL HAND PAIN: ICD-10-CM

## 2022-11-01 DIAGNOSIS — Z98.890 STATUS POST CARPAL TUNNEL RELEASE OF BOTH WRISTS: ICD-10-CM

## 2022-11-01 DIAGNOSIS — M18.12 PRIMARY OSTEOARTHRITIS OF FIRST CARPOMETACARPAL JOINT OF LEFT HAND: Primary | ICD-10-CM

## 2022-11-01 PROCEDURE — 99213 OFFICE O/P EST LOW 20 MIN: CPT | Performed by: ORTHOPAEDIC SURGERY

## 2022-11-01 NOTE — LETTER
11/1/2022    Patient: Wayne Flores   YOB: 1970   Date of Visit: 11/1/2022     Lenore Moncada NP  [address] [address]  Via In Basket    Dear Lenore Moncada NP,      Thank you for referring Ms. Lawrence Delacruz to [facility] for evaluation. My notes for this consultation are attached. If you have questions, please do not hesitate to call me. I look forward to following your patient along with you.       Sincerely,    Idania Luque MD

## 2022-11-01 NOTE — LETTER
11/1/2022    Ms. [address] [id number]      Patient is scheduled to undergo left hand surgery on 11/29/22 and will need to be out of work for up to 6 weeks.       Sincerely,      David Goel MD

## 2022-11-01 NOTE — LETTER
11/1/2022    Ms[address] [address]      Patient is scheduled to undergo left thumb MP joint fusion under axillary block on 11/29/22.       Sincerely,      Arleth Hendrix MD

## 2022-11-03 ENCOUNTER — TELEPHONE (OUTPATIENT)
Dept: NEUROLOGY | Age: 52
End: 2022-11-03

## 2022-11-03 DIAGNOSIS — M25.342 CHRONIC INSTABILITY OF METACARPOPHALANGEAL JOINT OF LEFT THUMB: Primary | ICD-10-CM

## 2022-11-03 NOTE — TELEPHONE ENCOUNTER
Patient would like a call regarding her having hand surgery on November 29th and she needs an appointment before then to see [facility]. Patient needs a note for her employment.     Please contact

## 2022-11-09 NOTE — TELEPHONE ENCOUNTER
Returned her call. Patient is scheduled for surgery on the same hand she had surgery on before on November 29th. She called and stated she needed an appt with you before then (you dont have anything) she needs to be taken out of work for about 90 days she said for her surgery.    Please advise

## 2022-11-21 ENCOUNTER — TELEPHONE (OUTPATIENT)
Dept: NEUROLOGY | Age: 52
End: 2022-11-21

## 2022-11-28 DIAGNOSIS — M18.12 PRIMARY OSTEOARTHRITIS OF FIRST CARPOMETACARPAL JOINT OF LEFT HAND: Primary | ICD-10-CM

## 2022-11-28 RX ORDER — HYDROMORPHONE HYDROCHLORIDE 2 MG/1
2 TABLET ORAL
Qty: 15 TABLET | Refills: 0 | Status: SHIPPED | OUTPATIENT
Start: 2022-11-28 | End: 2022-12-05

## 2022-12-09 ENCOUNTER — OFFICE VISIT (OUTPATIENT)
Dept: ORTHOPEDIC SURGERY | Age: 52
End: 2022-12-09
Payer: COMMERCIAL

## 2022-12-09 DIAGNOSIS — G56.03 BILATERAL CARPAL TUNNEL SYNDROME: ICD-10-CM

## 2022-12-09 DIAGNOSIS — Z98.890 STATUS POST CARPAL TUNNEL RELEASE OF BOTH WRISTS: ICD-10-CM

## 2022-12-09 DIAGNOSIS — M18.12 PRIMARY OSTEOARTHRITIS OF FIRST CARPOMETACARPAL JOINT OF LEFT HAND: Primary | ICD-10-CM

## 2022-12-09 DIAGNOSIS — M25.342 CHRONIC INSTABILITY OF METACARPOPHALANGEAL JOINT OF LEFT THUMB: ICD-10-CM

## 2022-12-09 NOTE — PROGRESS NOTES
HPI: Wayne Flores (: 1970) is a 47 y.o. female, patient, here for evaluation of the following chief complaint(s):    Surgical Follow-up (Left thumb MP joint fusion on 22.)  Patient is seen once again to evaluate her hands. She had undergone bilateral wrist endoscopic carpal tunnel surgery on 2019. She feels she has been experiencing similar symptoms on both sides. She did have an EMG on 10/21/2021 that really only showed the residual of prior carpal tunnel syndrome although she still feels much more symptomatic on the left than the right side. She admits to a component of left thumb basal joint arthritic pain worsened with , pinch and twist turning motions. She has had documented de Quervain's tenosynovitis and has been performing stretching exercises without long-term relief. She underwent a left thumb CMC arthroplasty with de Quervain's release, APL interpositional tendon transfer, suspension plasty and revision left open carpal tunnel release with flexor tenosynovectomy on 2022. He recovered very well from that surgery but has had increasing left thumb MP instability with hyperextension approaching 80 degrees. This is reportedly effecting the dorsal ulnar side of her left hand during pinch and  activities. She underwent a left thumb metacarpalphalangeal joint arthrodesis on 2022. Vitals:  LMP 2014 (Approximate)    There is no height or weight on file to calculate BMI. Allergies   Allergen Reactions    Latex Other (comments)     Rash, skin breakdown    Adhesive Tape-Silicones Rash and Itching    Darvocet A500 [Propoxyphene N-Acetaminophen] Nausea Only and Other (comments)     headache    Diazepam Other (comments)     Not an allergy, just does not work for her at all.     Hydrocodone Nausea and Vomiting and Vertigo    Percocet [Oxycodone-Acetaminophen] Nausea Only and Other (comments)     headache    Tylenol-Codeine #2 Itching, Nausea Only and Other (comments)     HEADACHES         Current Outpatient Medications   Medication Sig    Latuda 20 mg tab tablet     omeprazole (PRILOSEC) 20 mg capsule Take 1 Capsule by mouth daily. amLODIPine (NORVASC) 5 mg tablet Take 1 Tablet by mouth daily. meclizine (ANTIVERT) 25 mg tablet TK 1 T PO UP TO TID PRN    cetirizine (ZYRTEC) 10 mg tablet TK 1 T PO QD  Indications: seasonal runny nose    traZODone (DESYREL) 100 mg tablet TK 2 TS PO QHS    Trintellix 20 mg tablet TK 1 T PO QD    diclofenac (VOLTAREN) 1 % gel Apply  to affected area four (4) times daily. pyridoxine, vitamin B6, (VITAMIN B-6) 25 mg tablet Take 1 Tab by mouth daily. cyanocobalamin 1,000 mcg tablet Take 1 Tab by mouth daily. ibuprofen (MOTRIN) 800 mg tablet Take 1 Tab by mouth every six (6) hours as needed for Pain. ferrous sulfate (FEOSOL) 325 mg (65 mg iron) tablet Take  by mouth Daily (before breakfast). Takes 3 tabs daily    busPIRone (BUSPAR) 5 mg tablet Take 15 mg by mouth three (3) times daily (with meals). Indications: taking 15 mg BID    gabapentin (NEURONTIN) 300 mg capsule Take 300 mg by mouth three (3) times daily. No current facility-administered medications for this visit.        Past Medical History:   Diagnosis Date    Arthritis     HANDS AND KNEES    Asthma     Breast cancer ([facility] [address] 03/31/2017    Carcinoma in situ    Cancer ([facility] [address] 2016    RIGHT breast cancer    Depression     GERD (gastroesophageal reflux disease)     Heart murmur     Hypertension     Neuropathy     PUD (peptic ulcer disease)     S/P radiation therapy 2016    and chemo finished all 1/2017    Vertigo         Past Surgical History:   Procedure Laterality Date    HAND/FINGER SURGERY UNLISTED Bilateral 2020    bilateral endoscopic carpal tunnel releases     HX BREAST BIOPSY Right     SEVERAL TIMES    HX BREAST LUMPECTOMY Right 3/31/2016    RIGHT BREAST LUMPECTOMY, RIGHT SENTINEL NODE BIOPSY WITH ULTRASOUND performed by Katie Chance MD at [address] [place] [address] OR    HX CHOLECYSTECTOMY      HX GI      COLONOSCOPY    HX GYN      cesarian x 3    HX HYSTERECTOMY      Still has ovaries    HX ORTHOPAEDIC Left     ARM FX WITH PINS    HX TONSILLECTOMY      HX UROLOGICAL      CYSTOSCOPY    HX UROLOGICAL      BLADDER SLING    KAREN STEREO  BX BREAST RT 1ST LESION W/CLIP AND SPECIMEN Right 2013    benign    KAREN US BX BREAST RT 1ST LESION W/CLIP AND SPECIMEN Right 01/29/2016    Ca in situ    TN BREAST SURGERY PROCEDURE UNLISTED Right 5/17/16    RIGHT axillary lymph node dissection and PAC insertion       Family History   Problem Relation Age of Onset    Hypertension Mother     Kidney Disease Mother         ON DIALYSIS    Heart Disease Mother     No Known Problems Father     Asthma Son     Asthma Daughter     No Known Problems Son     Arthritis-rheumatoid Paternal Aunt     Anesth Problems Neg Hx         Social History     Tobacco Use    Smoking status: Never    Smokeless tobacco: Never    Tobacco comments:      smokes   Vaping Use    Vaping Use: Never used   Substance Use Topics    Alcohol use: Yes     Alcohol/week: 0.0 standard drinks     Comment: occasionally a glass of wine    Drug use: No        Review of Systems   All other systems reviewed and are negative. Physical Exam    Postoperative the left thumb wound is healing well there is no redness drainage or sign infection. Good overall alignment. Imaging:    XR Results (most recent):  Results from Appointment encounter on 12/09/22    XR THUMB LT MIN 2 V    Narrative  AP, lateral and oblique x-ray of the left thumb shows the dorsal fusion plate and screw fixation in good position alignment across the metacarpal phalangeal joint. She is status post thumb CMC arthroplasty no change to the suspensioplasty buttons. ASSESSMENT/PLAN:  Below is the assessment and plan developed based on review of pertinent history, physical exam, labs, studies, and medications.     Patient underwent bilateral scopic carpal tunnel surgery in 2019 and now feels there is recurrence yet the EMG from October did not show any advanced findings. She seems to be more clinically symptomatic complaining of \"locking up\", \"dropping things\". She also describes a history of neuropathy. She has advanced thumb CMC basal joint arthritis on the left side with de Quervain's tenosynovitis. She was offered but deferred conservative treatment including injections and prefers to consider operative management. I offered her the option to undergo a left thumb CMC arthroplasty with de Quervain's release to utilize the APL as an interpositional tendon graft. At the same time she can undergo revision open carpal tunnel release with flexor tenosynovectomy. She indeed underwent a left thumb CMC arthroplasty with de Quervain's release, APL interpositional tendon transfer and revision left carpal tunnel release with flexor tenosynovectomy on 1/18/2022. She recovered very well from that surgery but has notable increase instability of the left thumb metacarpal phalangeal joint upwards of hyperextension of 80 degrees. When she pinches her  she tends to see collapse of the thumb at the MP joint and feels this may be a  for discomfort to the dorsal ulnar hand or no further abnormality was detected. She underwent a left thumb metacarpal phalangeal joint arthrodesis on 11/29/2022. Overall she is pleased with the appearance and early function. Recommended short arm thumb spica cast for comfort and protection and gradual motion and strength recovery. Follow-up in 3 to 4 weeks. 1. Primary osteoarthritis of first carpometacarpal joint of left hand  2. Bilateral carpal tunnel syndrome  3. Status post carpal tunnel release of both wrists  4. 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     An electronic signature was used to authenticate this note.   -- Leticia Langford MD

## 2022-12-09 NOTE — PATIENT INSTRUCTIONS
Wearing a Fiberglass Cast: Care Instructions  Your Care Instructions     A cast protects a broken bone or other injury while it heals. Most casts are made of fiberglass. After a cast is put on, you can't remove it yourself. Your doctor or a technician will take it off. Follow-up care is a key part of your treatment and safety. Be sure to make and go to all appointments, and call your doctor if you are having problems. It's also a good idea to know your test results and keep a list of the medicines you take. How can you care for yourself at home? General care  Follow your doctor's instructions for when you can start using the limb that has the cast. Fiberglass casts dry quickly and are soon hard enough to protect the injured arm or leg. When it's okay to put weight on your leg or foot cast, don't stand or walk on it unless it's designed for walking. Prop up the injured arm or leg on a pillow anytime you sit or lie down during the first 3 days. Try to keep it above the level of your heart. This will help reduce swelling. Put ice or a cold pack on your cast for 10 to 20 minutes at a time. Try to do this every 1 to 2 hours for the next 3 days (when you are awake). Put a thin cloth between the ice and your cast. Keep the cast dry. Be safe with medicines. Read and follow all instructions on the label. If the doctor gave you a prescription medicine for pain, take it as prescribed. If you are not taking a prescription pain medicine, ask your doctor if you can take an over-the-counter medicine. Do exercises as instructed by your doctor or physical therapist. These exercises will help keep your muscles strong and your joints flexible while you heal.  Wiggle your fingers or toes on the injured arm or leg often. This helps reduce swelling and stiffness. Water and your cast  Try to keep your cast as dry as you can. The fiberglass part of your cast can get wet. But getting the inside wet can cause problems.   Use a bag or tape a sheet of plastic to cover your cast when you take a shower or bath or when you have any other contact with water. (Don't take a bath unless you can keep the cast out of the water.) Moisture can collect under the cast and cause skin irritation and itching. It can make infection more likely if you have had surgery or have a wound under the cast.  If you have a water-resistant cast, ask your doctor how often it can get wet and how to take care of it. Cast and skin care  Try blowing cool air from a hair dryer or fan into the cast to help relieve itching. Never stick items under your cast to scratch the skin. Don't use oils or lotions near your cast. If the skin gets red or irritated around the edge of the cast, you may pad the edges with a soft material or use tape to cover them. When should you call for help? Call your doctor now or seek immediate medical care if:    You have increased or severe pain. You feel a warm or painful spot under the cast.     You have problems with your cast. For example: The skin under the cast burns or stings. The cast feels too tight or too loose. There is a lot of swelling near the cast. (Some swelling is normal.)  You have a new fever. There is drainage or a bad smell coming from the cast.     Your foot or hand is cool or pale or changes color. You have trouble moving your fingers or toes. You have symptoms of a blood clot in your arm or leg (called a deep vein thrombosis). These may include:  Pain in the arm, calf, back of the knee, thigh, or groin. Redness and swelling in the arm, leg, or groin. Watch closely for changes in your health, and be sure to contact your doctor if:    The cast is breaking apart. You are not getting better as expected. Where can you learn more? Go to http://www.gray.com/  Enter O925 in the search box to learn more about \"Wearing a Fiberglass Cast: Care Instructions. \"  Current as of: March 9, 2022               Content Version: 13.4  © 7162-3956 Healthwise, Incorporated. Care instructions adapted under license by [email or web address] (which disclaims liability or warranty for this information). If you have questions about a medical condition or this instruction, always ask your healthcare professional. [address] any warranty or liability for your use of this information.

## 2023-01-12 NOTE — PROGRESS NOTES
HPI: Julienne Krishna (: 1970) is a 47 y.o. female, patient, here for evaluation of the following chief complaint(s):    Surgical Follow-up (Follow up on surgery done 2022.)  Patient is seen once again to evaluate her hands. She had undergone bilateral wrist endoscopic carpal tunnel surgery on 2019. She feels she has been experiencing similar symptoms on both sides. She did have an EMG on 10/21/2021 that really only showed the residual of prior carpal tunnel syndrome although she still feels much more symptomatic on the left than the right side. She admits to a component of left thumb basal joint arthritic pain worsened with , pinch and twist turning motions. She has had documented de Quervain's tenosynovitis and has been performing stretching exercises without long-term relief. She underwent a left thumb CMC arthroplasty with de Quervain's release, APL interpositional tendon transfer, suspension plasty and revision left open carpal tunnel release with flexor tenosynovectomy on 2022. He recovered very well from that surgery but has had increasing left thumb MP instability with hyperextension approaching 80 degrees. This is reportedly effecting the dorsal ulnar side of her left hand during pinch and  activities. She underwent a left thumb metacarpalphalangeal joint arthrodesis on 2022. She returned for short arm thumb spica cast removal on 2023. Vitals:  Ht 5' 4\" (1.626 m)   Wt 198 lb (89.8 kg)   LMP 2014 (Approximate)   BMI 33.99 kg/m²    Body mass index is 33.99 kg/m². Allergies   Allergen Reactions    Latex Other (comments)     Rash, skin breakdown    Adhesive Tape-Silicones Rash and Itching    Darvocet A500 [Propoxyphene N-Acetaminophen] Nausea Only and Other (comments)     headache    Diazepam Other (comments)     Not an allergy, just does not work for her at all.     Hydrocodone Nausea and Vomiting and Vertigo    Percocet [Oxycodone-Acetaminophen] Nausea Only and Other (comments)     headache    Tylenol-Codeine #2 Itching, Nausea Only and Other (comments)     HEADACHES         Current Outpatient Medications   Medication Sig    Latuda 20 mg tab tablet     omeprazole (PRILOSEC) 20 mg capsule Take 1 Capsule by mouth daily. amLODIPine (NORVASC) 5 mg tablet Take 1 Tablet by mouth daily. meclizine (ANTIVERT) 25 mg tablet TK 1 T PO UP TO TID PRN    cetirizine (ZYRTEC) 10 mg tablet TK 1 T PO QD  Indications: seasonal runny nose    traZODone (DESYREL) 100 mg tablet TK 2 TS PO QHS    Trintellix 20 mg tablet TK 1 T PO QD    diclofenac (VOLTAREN) 1 % gel Apply  to affected area four (4) times daily. pyridoxine, vitamin B6, (VITAMIN B-6) 25 mg tablet Take 1 Tab by mouth daily. cyanocobalamin 1,000 mcg tablet Take 1 Tab by mouth daily. ibuprofen (MOTRIN) 800 mg tablet Take 1 Tab by mouth every six (6) hours as needed for Pain. ferrous sulfate (FEOSOL) 325 mg (65 mg iron) tablet Take  by mouth Daily (before breakfast). Takes 3 tabs daily    busPIRone (BUSPAR) 5 mg tablet Take 15 mg by mouth three (3) times daily (with meals). Indications: taking 15 mg BID    gabapentin (NEURONTIN) 300 mg capsule Take 300 mg by mouth three (3) times daily. No current facility-administered medications for this visit.        Past Medical History:   Diagnosis Date    Arthritis     HANDS AND KNEES    Asthma     Breast cancer ([facility] [address] 03/31/2017    Carcinoma in situ    Cancer ([facility] [address] 2016    RIGHT breast cancer    Depression     GERD (gastroesophageal reflux disease)     Heart murmur     Hypertension     Neuropathy     PUD (peptic ulcer disease)     S/P radiation therapy 2016    and chemo finished all 1/2017    Vertigo         Past Surgical History:   Procedure Laterality Date    HX BREAST BIOPSY Right     SEVERAL TIMES    HX BREAST LUMPECTOMY Right 3/31/2016    RIGHT BREAST LUMPECTOMY, RIGHT SENTINEL NODE BIOPSY WITH ULTRASOUND performed by Murphy Garcia Sandhya Abbott MD at [facility] AMBUL[facility] OR    HX CHOLECYSTECTOMY      HX GI      COLONOSCOPY    HX GYN      cesarian x 3    HX HYSTERECTOMY      Still has ovaries    HX ORTHOPAEDIC Left     ARM FX WITH PINS    HX TONSILLECTOMY      HX UROLOGICAL      CYSTOSCOPY    HX UROLOGICAL      BLADDER SLING    KAREN STEREO  BX BREAST RT 1ST LESION W/CLIP AND SPECIMEN Right 2013    benign    KAREN US BX BREAST RT 1ST LESION W/CLIP AND SPECIMEN Right 01/29/2016    Ca in situ    MS UNLISTED PROCEDURE BREAST Right 5/17/16    RIGHT axillary lymph node dissection and PAC insertion    MS UNLISTED PROCEDURE HANDS/FINGERS Bilateral 2020    bilateral endoscopic carpal tunnel releases        Family History   Problem Relation Age of Onset    Hypertension Mother     Kidney Disease Mother         ON DIALYSIS    Heart Disease Mother     No Known Problems Father     Asthma Son     Asthma Daughter     No Known Problems Son     Arthritis-rheumatoid Paternal Aunt     Anesth Problems Neg Hx         Social History     Tobacco Use    Smoking status: Never    Smokeless tobacco: Never    Tobacco comments:      smokes   Vaping Use    Vaping Use: Never used   Substance Use Topics    Alcohol use: Yes     Alcohol/week: 0.0 standard drinks     Comment: occasionally a glass of wine    Drug use: No        Review of Systems   All other systems reviewed and are negative. Physical Exam    Postoperative the left thumb wound is well-healed and there is no redness drainage or sign infection. Good overall alignment. Imaging:    XR Results (most recent):  Results from Appointment encounter on 01/13/23    XR THUMB LT MIN 2 V    Narrative  AP, lateral and oblique x-ray of the left thumb shows progressive healing of the metacarpal phalangeal thumb fusion in good position alignment with dorsal plate and screw fixation. She has had a prior thumb CMC arthroplasty with trapezium resection and no change of the suspensioplasty buttons.        ASSESSMENT/PLAN:  Below is the assessment and plan developed based on review of pertinent history, physical exam, labs, studies, and medications. Patient underwent bilateral scopic carpal tunnel surgery in 2019 and now feels there is recurrence yet the EMG from October did not show any advanced findings. She seems to be more clinically symptomatic complaining of \"locking up\", \"dropping things\". She also describes a history of neuropathy. She has advanced thumb CMC basal joint arthritis on the left side with de Quervain's tenosynovitis. She was offered but deferred conservative treatment including injections and prefers to consider operative management. I offered her the option to undergo a left thumb CMC arthroplasty with de Quervain's release to utilize the APL as an interpositional tendon graft. At the same time she can undergo revision open carpal tunnel release with flexor tenosynovectomy. She indeed underwent a left thumb CMC arthroplasty with de Quervain's release, APL interpositional tendon transfer and revision left carpal tunnel release with flexor tenosynovectomy on 1/18/2022. She recovered very well from that surgery but has notable increase instability of the left thumb metacarpal phalangeal joint upwards of hyperextension of 80 degrees. When she pinches her  she tends to see collapse of the thumb at the MP joint and feels this may be a  for discomfort to the dorsal ulnar hand or no further abnormality was detected. She underwent a left thumb metacarpal phalangeal joint arthrodesis on 11/29/2022. Overall she is pleased with the appearance and early function. Recommended short arm thumb spica cast for comfort and protection and gradual motion and strength recovery. She returned for cast removal on 1/13/2023. She will utilize a thumb spica splint for comfort and support and work to further restore mobility and strength.   He deferred the need for outpatient therapy and will continue to follow home exercise program.  Overall she is pleased with her recovery and the appearance as well. I plan to see her back in 6 to 8 weeks for what may be a final visit. 1. Primary osteoarthritis of first carpometacarpal joint of left hand  -     [facility] W/O JOINTS PRE OTS  -     REFERRAL TO DME  2. Bilateral carpal tunnel syndrome  3. 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 Status post carpal tunnel release of both wrists  5. De Quervain's tenosynovitis, left  -     XR THUMB LT MIN 2 V; Future  6. Bilateral hand pain      Return in about 6 weeks (around 2/24/2023). An electronic signature was used to authenticate this note.   -- Suzan Moser MD

## 2023-01-13 ENCOUNTER — OFFICE VISIT (OUTPATIENT)
Dept: ORTHOPEDIC SURGERY | Age: 53
End: 2023-01-13
Payer: MEDICAID

## 2023-01-13 VITALS — BODY MASS INDEX: 33.8 KG/M2 | HEIGHT: 64 IN | WEIGHT: 198 LBS

## 2023-01-13 DIAGNOSIS — Z98.890 STATUS POST CARPAL TUNNEL RELEASE OF BOTH WRISTS: ICD-10-CM

## 2023-01-13 DIAGNOSIS — M18.12 PRIMARY OSTEOARTHRITIS OF FIRST CARPOMETACARPAL JOINT OF LEFT HAND: Primary | ICD-10-CM

## 2023-01-13 DIAGNOSIS — M79.641 BILATERAL HAND PAIN: ICD-10-CM

## 2023-01-13 DIAGNOSIS — M79.642 BILATERAL HAND PAIN: ICD-10-CM

## 2023-01-13 DIAGNOSIS — G56.03 BILATERAL CARPAL TUNNEL SYNDROME: ICD-10-CM

## 2023-01-13 DIAGNOSIS — M65.4 DE QUERVAIN'S TENOSYNOVITIS, LEFT: ICD-10-CM

## 2023-01-13 DIAGNOSIS — M25.342 CHRONIC INSTABILITY OF METACARPOPHALANGEAL JOINT OF LEFT THUMB: ICD-10-CM

## 2023-01-13 NOTE — PATIENT INSTRUCTIONS
Thumb Arthritis: Exercises  Introduction  Here are some examples of exercises for you to try. The exercises may be suggested for a condition or for rehabilitation. Start each exercise slowly. Ease off the exercises if you start to have pain. You will be told when to start these exercises and which ones will work best for you. How to do the exercises  Thumb IP flexion  Place your forearm and hand on a table with your affected thumb pointing up. With your other hand, hold your thumb steady just below the joint nearest your thumbnail. Bend the tip of your thumb downward, then straighten it. Repeat 8 to 12 times. Switch hands and repeat steps 1 through 4, even if only one thumb is sore. Thumb MP flexion  Place your forearm and hand on a table with your affected thumb pointing up. With your other hand, hold the base of your thumb and palm steady. Bend your thumb downward where it meets your palm, then straighten it. Repeat 8 to 12 times. Switch hands and repeat steps 1 through 4, even if only one thumb is sore. Thumb opposition  With your affected hand, point your fingers and thumb straight up. Your wrist should be relaxed, following the line of your fingers and thumb. Touch your affected thumb to each finger, one finger at a time. This will look like an \"okay\" sign, but try to keep your other fingers straight and pointing upward as much as you can. Repeat 8 to 12 times. Switch hands and repeat steps 1 through 3, even if only one thumb is sore. Follow-up care is a key part of your treatment and safety. Be sure to make and go to all appointments, and call your doctor if you are having problems. It's also a good idea to know your test results and keep a list of the medicines you take. Current as of: March 9, 2022               Content Version: 13.4  © 4217-2298 Healthwise, Incorporated.    Care instructions adapted under license by [employer] (which disclaims liability or warranty for this information). If you have questions about a medical condition or this instruction, always ask your healthcare professional. [place] [address] any warranty or liability for your use of this information.

## 2023-01-13 NOTE — LETTER
1/13/2023    Patient: Noel Edmonds   YOB: 1970   Date of Visit: 1/13/2023     Sumi Kim NP  [address] [address]  Via In Basket    Dear Sumi Kim NP,      Thank you for referring Ms. Deshaun Harrison to [facility] for evaluation. My notes for this consultation are attached. If you have questions, please do not hesitate to call me. I look forward to following your patient along with you.       Sincerely,    Shayy Torres MD

## 2023-05-26 RX ORDER — LURASIDONE HYDROCHLORIDE 20 MG/1
TABLET, FILM COATED ORAL
COMMUNITY
Start: 2021-12-16

## 2023-05-26 RX ORDER — CETIRIZINE HYDROCHLORIDE 10 MG/1
TABLET ORAL
COMMUNITY
Start: 2020-07-23

## 2023-05-26 RX ORDER — MECLIZINE HYDROCHLORIDE 25 MG/1
TABLET ORAL
COMMUNITY
Start: 2020-07-23

## 2023-05-26 RX ORDER — OMEPRAZOLE 20 MG/1
20 CAPSULE, DELAYED RELEASE ORAL DAILY
COMMUNITY
Start: 2021-12-30

## 2023-05-26 RX ORDER — GABAPENTIN 300 MG/1
300 CAPSULE ORAL 3 TIMES DAILY
COMMUNITY
Start: 2017-04-14

## 2023-05-26 RX ORDER — PYRIDOXINE HCL (VITAMIN B6) 25 MG
25 TABLET ORAL DAILY
COMMUNITY
Start: 2019-07-31

## 2023-05-26 RX ORDER — IBUPROFEN 800 MG/1
800 TABLET ORAL EVERY 6 HOURS PRN
COMMUNITY
Start: 2018-11-25

## 2023-05-26 RX ORDER — TRAZODONE HYDROCHLORIDE 100 MG/1
TABLET ORAL
COMMUNITY
Start: 2020-07-07

## 2023-05-26 RX ORDER — BUSPIRONE HYDROCHLORIDE 5 MG/1
15 TABLET ORAL
COMMUNITY
Start: 2017-05-22

## 2023-05-26 RX ORDER — AMLODIPINE BESYLATE 5 MG/1
5 TABLET ORAL DAILY
COMMUNITY
Start: 2021-12-03

## 2023-05-26 RX ORDER — FERROUS SULFATE 325(65) MG
TABLET ORAL
COMMUNITY

## 2024-03-07 ENCOUNTER — OFFICE VISIT (OUTPATIENT)
Facility: CLINIC | Age: 54
End: 2024-03-07

## 2024-03-07 VITALS
TEMPERATURE: 97.8 F | RESPIRATION RATE: 16 BRPM | OXYGEN SATURATION: 99 % | SYSTOLIC BLOOD PRESSURE: 142 MMHG | HEIGHT: 64 IN | BODY MASS INDEX: 24.75 KG/M2 | HEART RATE: 72 BPM | DIASTOLIC BLOOD PRESSURE: 94 MMHG | WEIGHT: 145 LBS

## 2024-03-07 DIAGNOSIS — R73.09 ELEVATED HEMOGLOBIN A1C: ICD-10-CM

## 2024-03-07 DIAGNOSIS — R79.89 LOW VITAMIN D LEVEL: ICD-10-CM

## 2024-03-07 DIAGNOSIS — Z00.00 PHYSICAL EXAM, ANNUAL: ICD-10-CM

## 2024-03-07 DIAGNOSIS — Z12.11 SCREENING FOR MALIGNANT NEOPLASM OF COLON: ICD-10-CM

## 2024-03-07 DIAGNOSIS — Z00.00 PHYSICAL EXAM, ANNUAL: Primary | ICD-10-CM

## 2024-03-07 DIAGNOSIS — L98.9 SKIN LESION OF BACK: ICD-10-CM

## 2024-03-07 DIAGNOSIS — Z00.00 ANNUAL PHYSICAL EXAM: ICD-10-CM

## 2024-03-07 DIAGNOSIS — Z13.9 SCREENING DUE: ICD-10-CM

## 2024-03-07 DIAGNOSIS — G47.00 INSOMNIA, UNSPECIFIED TYPE: ICD-10-CM

## 2024-03-07 RX ORDER — AMLODIPINE BESYLATE 5 MG/1
5 TABLET ORAL DAILY
Qty: 90 TABLET | Refills: 1 | Status: SHIPPED | OUTPATIENT
Start: 2024-03-07

## 2024-03-07 RX ORDER — TRAZODONE HYDROCHLORIDE 50 MG/1
50 TABLET ORAL NIGHTLY
Qty: 90 TABLET | Refills: 1 | Status: SHIPPED | OUTPATIENT
Start: 2024-03-07

## 2024-03-07 SDOH — ECONOMIC STABILITY: INCOME INSECURITY: HOW HARD IS IT FOR YOU TO PAY FOR THE VERY BASICS LIKE FOOD, HOUSING, MEDICAL CARE, AND HEATING?: NOT HARD AT ALL

## 2024-03-07 SDOH — ECONOMIC STABILITY: FOOD INSECURITY: WITHIN THE PAST 12 MONTHS, YOU WORRIED THAT YOUR FOOD WOULD RUN OUT BEFORE YOU GOT MONEY TO BUY MORE.: NEVER TRUE

## 2024-03-07 SDOH — ECONOMIC STABILITY: FOOD INSECURITY: WITHIN THE PAST 12 MONTHS, THE FOOD YOU BOUGHT JUST DIDN'T LAST AND YOU DIDN'T HAVE MONEY TO GET MORE.: NEVER TRUE

## 2024-03-07 SDOH — ECONOMIC STABILITY: HOUSING INSECURITY
IN THE LAST 12 MONTHS, WAS THERE A TIME WHEN YOU DID NOT HAVE A STEADY PLACE TO SLEEP OR SLEPT IN A SHELTER (INCLUDING NOW)?: NO

## 2024-03-07 ASSESSMENT — PATIENT HEALTH QUESTIONNAIRE - PHQ9
SUM OF ALL RESPONSES TO PHQ QUESTIONS 1-9: 0
SUM OF ALL RESPONSES TO PHQ9 QUESTIONS 1 & 2: 0
2. FEELING DOWN, DEPRESSED OR HOPELESS: 0
SUM OF ALL RESPONSES TO PHQ QUESTIONS 1-9: 0
1. LITTLE INTEREST OR PLEASURE IN DOING THINGS: 0

## 2024-03-07 NOTE — PROGRESS NOTES
1. Have you been to the ER, urgent care clinic since your last visit?  Hospitalized since your last visit?No    2. Have you seen or consulted any other health care providers outside of the [facility] since your last visit?  Include any pap smears or colon screening. No    Chief Complaint   Patient presents with    Annual Exam     Health Maintenance Due   Topic Date Due    Hepatitis B vaccine (1 of 3 - 3-dose series) Never done    Pneumococcal 0-64 years Vaccine (1 - PCV) Never done    Depression Screen  Never done    DTaP/Tdap/Td vaccine (1 - Tdap) Never done    Shingles vaccine (1 of 2) Never done    COVID-19 Vaccine (3 - Pfizer risk series) 05/25/2021    Flu vaccine (1) Never done     PHQ-9 Total Score: 0 (3/7/2024  3:23 PM)        3/7/2024     3:00 PM   AMB Abuse Screening   Do you ever feel afraid of your partner? N   Are you in a relationship with someone who physically or mentally threatens you? N   Is it safe for you to go home? Y     Vitals:    03/07/24 1523   BP: (!) 142/94   Pulse: 72   Resp: 16   Temp: 97.8 °F (36.6 °C)   SpO2: 99%

## 2024-03-07 NOTE — PATIENT INSTRUCTIONS
Return for labs when fasting  Take Blood pressure medicine, goal is 140/90 and below  Drink water  Schedule with specialist for follow up

## 2024-03-08 ASSESSMENT — ENCOUNTER SYMPTOMS
EYES NEGATIVE: 1
SHORTNESS OF BREATH: 0
RESPIRATORY NEGATIVE: 1
GASTROINTESTINAL NEGATIVE: 1
ALLERGIC/IMMUNOLOGIC NEGATIVE: 1

## 2024-03-08 NOTE — PROGRESS NOTES
Assessment/Plan:     Tamra was seen today for annual exam.    Diagnoses and all orders for this visit:    Physical exam, annual  -     CBC with Auto Differential; Future  -     Comprehensive Metabolic Panel; Future  -     Lipid Panel; Future  -     TSH; Future  -     Urinalysis, Micro; Future  Patient seen in office for physical exam.  Reports some home stress but has been handling well.  She declines any referral for psych or any type of medication management.  She does not pose a threat to herself or others.  Will follow-up with lab results when they return.  Annual physical exam  -     [facility] - Umu Delgado MD, Ob-Gyn, [place]  History of breast cancer as well as partial hysterectomy.  Has not had follow-up with GYN.  I am referring her to GYN for women's health follow-up.  Insomnia, unspecified type  -     traZODone (DESYREL) 50 MG tablet; Take 1 tablet by mouth nightly  Taking medication to assist with sleep.  Patient reports that sometimes she feels sluggish the next day.  Recommended breaking the tablet in half.  Low vitamin D level  -     CBC with Auto Differential; Future  -     Vitamin D 25 Hydroxy; Future  Screening due  Elevated hemoglobin A1c  -     Hemoglobin A1C; Future  Screening due, previous history of elevated A1c but has had almost a 50 pound weight loss since gastric surgery last year.  Screening for malignant neoplasm of colon  -     [facility] - Nikolay Gilliam MD, GastroenterologyGaudencio (Leilani Mahoney)  Screening due referral completed  Skin lesion of back  -     External Referral To Dermatology  Reports a small dry patch of skin on middle back at bra line.  Will refer to dermatology.  On exam the area was dry with a slight pigmentation change.  Appears to be more dry skin.  Screening due  -     QuantiFERON In Tube ([facility] Default); Future  Patient requests screening for work.  Other orders  -     amLODIPine (NORVASC) 5 MG tablet; Take 1 tablet by mouth daily    Taking medication as

## 2024-03-09 LAB
25(OH)D3+25(OH)D2 SERPL-MCNC: 24.3 NG/ML (ref 30–100)
ALBUMIN SERPL-MCNC: 4.7 G/DL (ref 3.8–4.9)
ALBUMIN/GLOB SERPL: 1.7 {RATIO} (ref 1.2–2.2)
ALP SERPL-CCNC: 146 IU/L (ref 44–121)
ALT SERPL-CCNC: 78 IU/L (ref 0–32)
APPEARANCE UR: CLEAR
AST SERPL-CCNC: 103 IU/L (ref 0–40)
BACTERIA #/AREA URNS HPF: ABNORMAL /[HPF]
BASOPHILS # BLD AUTO: 0 X10E3/UL (ref 0–0.2)
BASOPHILS NFR BLD AUTO: 1 %
BILIRUB SERPL-MCNC: 0.2 MG/DL (ref 0–1.2)
BILIRUB UR QL STRIP: NEGATIVE
BUN SERPL-MCNC: 13 MG/DL (ref 6–24)
BUN/CREAT SERPL: 18 (ref 9–23)
CALCIUM SERPL-MCNC: 9.6 MG/DL (ref 8.7–10.2)
CASTS URNS QL MICRO: ABNORMAL /LPF
CHLORIDE SERPL-SCNC: 105 MMOL/L (ref 96–106)
CHOLEST SERPL-MCNC: 198 MG/DL (ref 100–199)
CO2 SERPL-SCNC: 23 MMOL/L (ref 20–29)
COLOR UR: YELLOW
CREAT SERPL-MCNC: 0.74 MG/DL (ref 0.57–1)
EGFRCR SERPLBLD CKD-EPI 2021: 96 ML/MIN/1.73
EOSINOPHIL # BLD AUTO: 0.2 X10E3/UL (ref 0–0.4)
EOSINOPHIL NFR BLD AUTO: 4 %
EPI CELLS #/AREA URNS HPF: ABNORMAL /HPF (ref 0–10)
ERYTHROCYTE [DISTWIDTH] IN BLOOD BY AUTOMATED COUNT: 13.1 % (ref 11.7–15.4)
GLOBULIN SER CALC-MCNC: 2.8 G/DL (ref 1.5–4.5)
GLUCOSE SERPL-MCNC: 90 MG/DL (ref 70–99)
GLUCOSE UR QL STRIP: NEGATIVE
HBA1C MFR BLD: 5.8 % (ref 4.8–5.6)
HCT VFR BLD AUTO: 38.5 % (ref 34–46.6)
HDLC SERPL-MCNC: 95 MG/DL
HGB BLD-MCNC: 12.2 G/DL (ref 11.1–15.9)
HGB UR QL STRIP: NEGATIVE
IMM GRANULOCYTES # BLD AUTO: 0 X10E3/UL (ref 0–0.1)
IMM GRANULOCYTES NFR BLD AUTO: 0 %
KETONES UR QL STRIP: NEGATIVE
LDLC SERPL CALC-MCNC: 77 MG/DL (ref 0–99)
LEUKOCYTE ESTERASE UR QL STRIP: ABNORMAL
LYMPHOCYTES # BLD AUTO: 1.5 X10E3/UL (ref 0.7–3.1)
LYMPHOCYTES NFR BLD AUTO: 34 %
MCH RBC QN AUTO: 28.8 PG (ref 26.6–33)
MCHC RBC AUTO-ENTMCNC: 31.7 G/DL (ref 31.5–35.7)
MCV RBC AUTO: 91 FL (ref 79–97)
MICRO URNS: ABNORMAL
MONOCYTES # BLD AUTO: 0.2 X10E3/UL (ref 0.1–0.9)
MONOCYTES NFR BLD AUTO: 5 %
NEUTROPHILS # BLD AUTO: 2.4 X10E3/UL (ref 1.4–7)
NEUTROPHILS NFR BLD AUTO: 56 %
NITRITE UR QL STRIP: POSITIVE
PH UR STRIP: 5.5 [PH] (ref 5–7.5)
PLATELET # BLD AUTO: 312 X10E3/UL (ref 150–450)
POTASSIUM SERPL-SCNC: 4.4 MMOL/L (ref 3.5–5.2)
PROT SERPL-MCNC: 7.5 G/DL (ref 6–8.5)
PROT UR QL STRIP: ABNORMAL
RBC # BLD AUTO: 4.24 X10E6/UL (ref 3.77–5.28)
RBC #/AREA URNS HPF: ABNORMAL /HPF (ref 0–2)
SODIUM SERPL-SCNC: 145 MMOL/L (ref 134–144)
SP GR UR STRIP: 1.02 (ref 1–1.03)
TRIGL SERPL-MCNC: 157 MG/DL (ref 0–149)
TSH SERPL DL<=0.005 MIU/L-ACNC: 2.36 UIU/ML (ref 0.45–4.5)
UROBILINOGEN UR STRIP-MCNC: 0.2 MG/DL (ref 0.2–1)
VLDLC SERPL CALC-MCNC: 26 MG/DL (ref 5–40)
WBC # BLD AUTO: 4.3 X10E3/UL (ref 3.4–10.8)
WBC #/AREA URNS HPF: ABNORMAL /HPF (ref 0–5)

## 2024-03-16 LAB
GAMMA INTERFERON BACKGROUND BLD IA-ACNC: 0.28 IU/ML
M TB IFN-G BLD-IMP: NEGATIVE
M TB IFN-G CD4+ BCKGRND COR BLD-ACNC: 0.38 IU/ML
M TB IFN-G CD4+CD8+ BCKGRND COR BLD-ACNC: 0.3 IU/ML
MITOGEN IGNF BCKGRD COR BLD-ACNC: >10 IU/ML
QUANTIFERON, INCUBATION: NORMAL
SERVICE CMNT-IMP: NORMAL

## 2024-04-04 ENCOUNTER — TRANSCRIBE ORDERS (OUTPATIENT)
Facility: HOSPITAL | Age: 54
End: 2024-04-04

## 2024-04-04 DIAGNOSIS — Z12.31 VISIT FOR SCREENING MAMMOGRAM: Primary | ICD-10-CM

## 2024-04-05 ENCOUNTER — HOSPITAL ENCOUNTER (OUTPATIENT)
Facility: HOSPITAL | Age: 54
End: 2024-04-05
Attending: INTERNAL MEDICINE
Payer: MEDICAID

## 2024-04-05 VITALS — BODY MASS INDEX: 24.75 KG/M2 | HEIGHT: 64 IN | WEIGHT: 145 LBS

## 2024-04-05 DIAGNOSIS — Z12.31 VISIT FOR SCREENING MAMMOGRAM: ICD-10-CM

## 2024-04-05 PROCEDURE — 77063 BREAST TOMOSYNTHESIS BI: CPT

## 2024-05-07 ENCOUNTER — OFFICE VISIT (OUTPATIENT)
Facility: CLINIC | Age: 54
End: 2024-05-07
Payer: MEDICAID

## 2024-05-07 VITALS
HEART RATE: 74 BPM | HEIGHT: 64 IN | RESPIRATION RATE: 16 BRPM | SYSTOLIC BLOOD PRESSURE: 130 MMHG | WEIGHT: 151.4 LBS | BODY MASS INDEX: 25.85 KG/M2 | DIASTOLIC BLOOD PRESSURE: 84 MMHG | TEMPERATURE: 98.1 F | OXYGEN SATURATION: 100 %

## 2024-05-07 DIAGNOSIS — E55.9 VITAMIN D INSUFFICIENCY: ICD-10-CM

## 2024-05-07 DIAGNOSIS — R74.8 ELEVATED LIVER ENZYMES: ICD-10-CM

## 2024-05-07 DIAGNOSIS — I10 ESSENTIAL HYPERTENSION: ICD-10-CM

## 2024-05-07 DIAGNOSIS — L98.7 EXCESS SKIN OF ABDOMEN: ICD-10-CM

## 2024-05-07 DIAGNOSIS — I10 ESSENTIAL HYPERTENSION: Primary | ICD-10-CM

## 2024-05-07 PROCEDURE — 3079F DIAST BP 80-89 MM HG: CPT | Performed by: STUDENT IN AN ORGANIZED HEALTH CARE EDUCATION/TRAINING PROGRAM

## 2024-05-07 PROCEDURE — 99213 OFFICE O/P EST LOW 20 MIN: CPT | Performed by: STUDENT IN AN ORGANIZED HEALTH CARE EDUCATION/TRAINING PROGRAM

## 2024-05-07 PROCEDURE — 3075F SYST BP GE 130 - 139MM HG: CPT | Performed by: STUDENT IN AN ORGANIZED HEALTH CARE EDUCATION/TRAINING PROGRAM

## 2024-05-07 RX ORDER — ACETAMINOPHEN 160 MG
1 TABLET,DISINTEGRATING ORAL DAILY
COMMUNITY

## 2024-05-07 RX ORDER — DOXEPIN HYDROCHLORIDE 25 MG/1
25 CAPSULE ORAL NIGHTLY
COMMUNITY
Start: 2024-04-23

## 2024-05-07 ASSESSMENT — PATIENT HEALTH QUESTIONNAIRE - PHQ9
SUM OF ALL RESPONSES TO PHQ9 QUESTIONS 1 & 2: 0
SUM OF ALL RESPONSES TO PHQ QUESTIONS 1-9: 0
SUM OF ALL RESPONSES TO PHQ QUESTIONS 1-9: 0
2. FEELING DOWN, DEPRESSED OR HOPELESS: NOT AT ALL
SUM OF ALL RESPONSES TO PHQ QUESTIONS 1-9: 0
SUM OF ALL RESPONSES TO PHQ QUESTIONS 1-9: 0
1. LITTLE INTEREST OR PLEASURE IN DOING THINGS: NOT AT ALL

## 2024-05-07 ASSESSMENT — ENCOUNTER SYMPTOMS
COUGH: 0
ABDOMINAL PAIN: 0
NAUSEA: 0
VOMITING: 0
SHORTNESS OF BREATH: 0
RHINORRHEA: 0

## 2024-05-07 NOTE — PROGRESS NOTES
Assessment/Plan:     Diagnoses and all orders for this visit:    Essential hypertension  -     Comprehensive Metabolic Panel; Future  -Chronic.  Well-controlled.  -Blood pressure now at goal since restarting amlodipine  -Continue amlodipine 5 mg daily    Excess skin of abdomen  -     External Referral To Plastic Surgery  -Patient has a previous history of weight loss surgery  -States she has lost almost 100 pounds  -Now has excess skin on her lower abdomen and would like referral for plastic surgery for possible removal.  -Referral placed today    Elevated liver enzymes  -     Comprehensive Metabolic Panel; Future  -Previous lab work noted elevated liver enzymes  -She denies any frequent alcohol usage  -Repeat lab work today    Vitamin D insufficiency  -     Vitamin D 25 Hydroxy; Future  -Previous history of vitamin D insufficiency  -Is now taking vitamin D3 2000 units daily  -Repeat vitamin D level         Return in about 4 months (around 9/7/2024), or if symptoms worsen or fail to improve.     Discussed expected course/resolution/complications of diagnosis in detail with patient.    Medication risks/benefits/costs/interactions/alternatives discussed with patient.    Pt expressed understanding with the diagnosis and plan      Subjective:      Tamra Beckford is a 53 y.o. female who presents for had concerns including Discuss Labs and Referral - General.     Current Outpatient Medications   Medication Sig Dispense Refill    doxepin (SINEQUAN) 25 MG capsule Take 1 capsule by mouth nightly      Cholecalciferol (VITAMIN D3) 50 MCG (2000 UT) CAPS Take 1 capsule by mouth daily      traZODone (DESYREL) 50 MG tablet Take 1 tablet by mouth nightly 90 tablet 1    amLODIPine (NORVASC) 5 MG tablet Take 1 tablet by mouth daily 90 tablet 1    lurasidone (LATUDA) 20 MG TABS tablet Take 1 tablet by mouth daily      VORTIoxetine HBr (TRINTELLIX) 20 MG TABS tablet Take 1 tablet by mouth daily       No current facility-administered

## 2024-05-07 NOTE — LETTER
12/9/2022    Patient: Betty Gallagher   YOB: 1970   Date of Visit: 12/9/2022     Naina Duncan NP  [address] [address]  Via In Basket    Dear Naina Duncan NP,      Thank you for referring Ms. Maria D Thrasher to [facility] for evaluation. My notes for this consultation are attached. If you have questions, please do not hesitate to call me. I look forward to following your patient along with you.       Sincerely,    Joseph Courtney MD coffee

## 2024-05-07 NOTE — PROGRESS NOTES
Rm    Chief Complaint   Patient presents with    Discuss Labs    Referral - General        /84 (Site: Left Upper Arm)   Pulse 74   Temp 98.1 °F (36.7 °C)   Resp 16   Ht 1.626 m (5' 4\")   Wt 68.7 kg (151 lb 6.4 oz)   SpO2 100%   BMI 25.99 kg/m²      1. Have you been to the ER, urgent care clinic since your last visit?  Hospitalized since your last visit? no    2. Have you seen or consulted any other health care providers outside of the [facility] since your last visit?  Include any pap smears or colon screening. no    Health Maintenance Due   Topic Date Due    Hepatitis B vaccine (1 of 3 - 3-dose series) Never done    Pneumococcal 0-64 years Vaccine (1 of 2 - PCV) Never done    DTaP/Tdap/Td vaccine (1 - Tdap) Never done    Shingles vaccine (1 of 2) Never done    COVID-19 Vaccine (3 - Pfizer risk series) 05/25/2021             No data to display                 Failed to redirect to the Timeline version of the Game[employer] SmartLink.    Failed to redirect to the Timeline version of the GameWith SmartLink.   \"Have you been to the ER, urgent care clinic since your last visit?  Hospitalized since your last visit?\"    NO    “Have you seen or consulted any other health care providers outside of [facility] since your last visit?”    NO            Click Here for Release of Records Request

## 2024-09-16 ENCOUNTER — ANESTHESIA EVENT (OUTPATIENT)
Facility: HOSPITAL | Age: 54
End: 2024-09-16
Payer: MEDICAID

## 2024-09-16 ENCOUNTER — ANESTHESIA (OUTPATIENT)
Facility: HOSPITAL | Age: 54
End: 2024-09-16
Payer: MEDICAID

## 2024-09-16 ENCOUNTER — HOSPITAL ENCOUNTER (OUTPATIENT)
Facility: HOSPITAL | Age: 54
Setting detail: OUTPATIENT SURGERY
Discharge: HOME | End: 2024-09-16
Attending: INTERNAL MEDICINE | Admitting: INTERNAL MEDICINE
Payer: MEDICAID

## 2024-09-16 VITALS
SYSTOLIC BLOOD PRESSURE: 168 MMHG | TEMPERATURE: 97.4 F | RESPIRATION RATE: 14 BRPM | WEIGHT: 159.4 LBS | DIASTOLIC BLOOD PRESSURE: 101 MMHG | OXYGEN SATURATION: 100 % | HEART RATE: 71 BPM | HEIGHT: 64 IN | BODY MASS INDEX: 27.21 KG/M2

## 2024-09-16 PROCEDURE — 3600007512: Performed by: INTERNAL MEDICINE

## 2024-09-16 PROCEDURE — 2709999900 HC NON-CHARGEABLE SUPPLY: Performed by: INTERNAL MEDICINE

## 2024-09-16 PROCEDURE — 3700000000 HC ANESTHESIA ATTENDED CARE: Performed by: INTERNAL MEDICINE

## 2024-09-16 PROCEDURE — 6360000002 HC RX W HCPCS

## 2024-09-16 PROCEDURE — 7100000011 HC PHASE II RECOVERY - ADDTL 15 MIN: Performed by: INTERNAL MEDICINE

## 2024-09-16 PROCEDURE — 3600007502: Performed by: INTERNAL MEDICINE

## 2024-09-16 PROCEDURE — 2580000003 HC RX 258: Performed by: INTERNAL MEDICINE

## 2024-09-16 PROCEDURE — 2500000003 HC RX 250 WO HCPCS

## 2024-09-16 PROCEDURE — 3700000001 HC ADD 15 MINUTES (ANESTHESIA): Performed by: INTERNAL MEDICINE

## 2024-09-16 PROCEDURE — 7100000010 HC PHASE II RECOVERY - FIRST 15 MIN: Performed by: INTERNAL MEDICINE

## 2024-09-16 RX ORDER — SODIUM CHLORIDE 9 MG/ML
25 INJECTION, SOLUTION INTRAVENOUS PRN
Status: DISCONTINUED | OUTPATIENT
Start: 2024-09-16 | End: 2024-09-16 | Stop reason: HOSPADM

## 2024-09-16 RX ORDER — SODIUM CHLORIDE 0.9 % (FLUSH) 0.9 %
5-40 SYRINGE (ML) INJECTION PRN
Status: DISCONTINUED | OUTPATIENT
Start: 2024-09-16 | End: 2024-09-16 | Stop reason: HOSPADM

## 2024-09-16 RX ORDER — SODIUM CHLORIDE 0.9 % (FLUSH) 0.9 %
5-40 SYRINGE (ML) INJECTION EVERY 12 HOURS SCHEDULED
Status: DISCONTINUED | OUTPATIENT
Start: 2024-09-16 | End: 2024-09-16 | Stop reason: HOSPADM

## 2024-09-16 RX ORDER — LIDOCAINE HYDROCHLORIDE 20 MG/ML
INJECTION, SOLUTION EPIDURAL; INFILTRATION; INTRACAUDAL; PERINEURAL
Status: DISCONTINUED | OUTPATIENT
Start: 2024-09-16 | End: 2024-09-16 | Stop reason: SDUPTHER

## 2024-09-16 RX ORDER — SODIUM CHLORIDE 9 MG/ML
INJECTION, SOLUTION INTRAVENOUS CONTINUOUS
Status: DISCONTINUED | OUTPATIENT
Start: 2024-09-16 | End: 2024-09-16 | Stop reason: HOSPADM

## 2024-09-16 RX ADMIN — PROPOFOL 50 MG: 10 INJECTION, EMULSION INTRAVENOUS at 12:21

## 2024-09-16 RX ADMIN — PROPOFOL 50 MG: 10 INJECTION, EMULSION INTRAVENOUS at 12:18

## 2024-09-16 RX ADMIN — PROPOFOL 50 MG: 10 INJECTION, EMULSION INTRAVENOUS at 12:23

## 2024-09-16 RX ADMIN — PROPOFOL 50 MG: 10 INJECTION, EMULSION INTRAVENOUS at 12:19

## 2024-09-16 RX ADMIN — SODIUM CHLORIDE: 9 INJECTION, SOLUTION INTRAVENOUS at 12:15

## 2024-09-16 RX ADMIN — LIDOCAINE HYDROCHLORIDE 45 MG: 20 INJECTION, SOLUTION EPIDURAL; INFILTRATION; INTRACAUDAL; PERINEURAL at 12:18

## 2024-09-16 ASSESSMENT — PAIN - FUNCTIONAL ASSESSMENT: PAIN_FUNCTIONAL_ASSESSMENT: NONE - DENIES PAIN

## 2024-12-17 ENCOUNTER — OFFICE VISIT (OUTPATIENT)
Facility: CLINIC | Age: 54
End: 2024-12-17

## 2024-12-17 VITALS
TEMPERATURE: 97 F | SYSTOLIC BLOOD PRESSURE: 128 MMHG | RESPIRATION RATE: 18 BRPM | HEIGHT: 64 IN | HEART RATE: 98 BPM | OXYGEN SATURATION: 96 % | DIASTOLIC BLOOD PRESSURE: 85 MMHG | BODY MASS INDEX: 28.17 KG/M2 | WEIGHT: 165 LBS

## 2024-12-17 DIAGNOSIS — Z23 IMMUNIZATION DUE: ICD-10-CM

## 2024-12-17 DIAGNOSIS — E55.9 VITAMIN D DEFICIENCY: ICD-10-CM

## 2024-12-17 DIAGNOSIS — R73.03 PREDIABETES: ICD-10-CM

## 2024-12-17 DIAGNOSIS — Z85.3 HISTORY OF BREAST CANCER: ICD-10-CM

## 2024-12-17 DIAGNOSIS — I10 ESSENTIAL HYPERTENSION: ICD-10-CM

## 2024-12-17 DIAGNOSIS — R74.8 ELEVATED LIVER ENZYMES: ICD-10-CM

## 2024-12-17 DIAGNOSIS — I10 ESSENTIAL HYPERTENSION: Primary | ICD-10-CM

## 2024-12-17 PROBLEM — C50.919 BREAST CANCER (HCC): Status: RESOLVED | Noted: 2017-01-13 | Resolved: 2024-12-17

## 2024-12-17 RX ORDER — LANOLIN ALCOHOL/MO/W.PET/CERES
1000 CREAM (GRAM) TOPICAL DAILY
COMMUNITY

## 2024-12-17 ASSESSMENT — ENCOUNTER SYMPTOMS
ABDOMINAL PAIN: 0
NAUSEA: 0
RHINORRHEA: 0
COUGH: 0
VOMITING: 0
SHORTNESS OF BREATH: 0

## 2024-12-17 NOTE — PROGRESS NOTES
Assessment/Plan:     Diagnoses and all orders for this visit:    Essential hypertension  -     Microalbumin / Creatinine Urine Ratio; Future  -Chronic.  Stable.  -Recent lab work ordered by bariatric surgeon reviewed  -Continue amlodipine 5 mg daily    Elevated liver enzymes  -     Hepatitis Panel, Acute; Future  -     Ferritin; Future  -     US ABDOMEN LIMITED; Future  -Recent lab work showed persistent elevation in liver enzymes  -Advised limiting alcohol usage  -Check additional lab work  -Also given persistence will check an abdominal ultrasound  -If persistent elevation noted may need to consider hepatology referral in the future    Vitamin D deficiency  -Chronic.  Persistent vitamin D deficiency noted on recent lab work  -PTH also mildly elevated with normal calcium.  This is likely secondary to vitamin D deficiency  -Recommend restarting vitamin D3 2000 units daily  -Follow-up in 3 months for reevaluation    Prediabetes  -History of prediabetes  -Discussed dietary and lifestyle modifications to help lower blood sugar levels    History of breast cancer  -Patient has a history of breast cancer  -Has been in remission for the past 7 to 8 years  -Continues to follow with oncology    Immunization due  -     Influenza, FLUCELVAX Trivalent, (age 6 mo+) IM, Preservative Free, 0.5mL         Return in about 3 months (around 3/17/2025).     Discussed expected course/resolution/complications of diagnosis in detail with patient.    Medication risks/benefits/costs/interactions/alternatives discussed with patient.    Pt expressed understanding with the diagnosis and plan.      Subjective:      Tamra Beckford is a 54 y.o. female who presents for had concerns including Results.     Current Outpatient Medications   Medication Sig Dispense Refill    vitamin B-12 (CYANOCOBALAMIN) 1000 MCG tablet Take 1 tablet by mouth daily      doxepin (SINEQUAN) 25 MG capsule Take 1 capsule by mouth nightly      Cholecalciferol (VITAMIN D3)

## 2024-12-17 NOTE — PROGRESS NOTES
Room:  I have reviewed all needed documentation in preparation for visit. Verified patient by name and date of birth  Chief Complaint   Patient presents with    Results       Vitals:    12/17/24 1427   BP: 128/85   Pulse: 98   Resp: 18   Temp: 97 °F (36.1 °C)   TempSrc: Temporal   SpO2: 96%   Weight: 74.8 kg (165 lb)   Height: 1.626 m (5' 4\")        Health Maintenance Due   Topic Date Due    Pneumococcal 0-64 years Vaccine (1 of 2 - PCV) Never done    Hepatitis B vaccine (1 of 3 - 19+ 3-dose series) Never done    Shingles vaccine (1 of 2) Never done    COVID-19 Vaccine (3 - Pfizer risk series) 05/25/2021    DTaP/Tdap/Td vaccine (2 - Td or Tdap) 02/13/2023    Flu vaccine (1) Never done        1. \"Have you been to the ER, urgent care clinic since your last visit?  Hospitalized since your last visit?\" Yes for cough    2. \"Have you seen or consulted any other health care providers outside of the [facility] since your last visit?\" No

## 2024-12-18 PROBLEM — F32.9 MAJOR DEPRESSIVE DISORDER: Status: ACTIVE | Noted: 2024-12-18

## 2024-12-18 PROBLEM — Z90.3 H/O GASTRIC SLEEVE: Status: ACTIVE | Noted: 2024-12-18

## 2025-03-07 ENCOUNTER — HOSPITAL ENCOUNTER (OUTPATIENT)
Facility: HOSPITAL | Age: 55
Discharge: HOME | End: 2025-03-10
Payer: COMMERCIAL

## 2025-03-07 VITALS
HEIGHT: 64 IN | HEART RATE: 94 BPM | SYSTOLIC BLOOD PRESSURE: 146 MMHG | BODY MASS INDEX: 28.68 KG/M2 | RESPIRATION RATE: 18 BRPM | DIASTOLIC BLOOD PRESSURE: 87 MMHG | WEIGHT: 168 LBS | TEMPERATURE: 98.4 F

## 2025-03-07 LAB
ANION GAP SERPL CALC-SCNC: 7 MMOL/L (ref 2–12)
BASOPHILS # BLD: 0.01 K/UL (ref 0–0.1)
BASOPHILS NFR BLD: 0.2 % (ref 0–1)
BUN SERPL-MCNC: 11 MG/DL (ref 6–20)
BUN/CREAT SERPL: 13 (ref 12–20)
CALCIUM SERPL-MCNC: 9.2 MG/DL (ref 8.5–10.1)
CHLORIDE SERPL-SCNC: 109 MMOL/L (ref 97–108)
CO2 SERPL-SCNC: 26 MMOL/L (ref 21–32)
CREAT SERPL-MCNC: 0.87 MG/DL (ref 0.55–1.02)
DIFFERENTIAL METHOD BLD: ABNORMAL
EKG ATRIAL RATE: 90 BPM
EKG DIAGNOSIS: NORMAL
EKG P AXIS: 55 DEGREES
EKG P-R INTERVAL: 144 MS
EKG Q-T INTERVAL: 332 MS
EKG QRS DURATION: 68 MS
EKG QTC CALCULATION (BAZETT): 406 MS
EKG R AXIS: -5 DEGREES
EKG T AXIS: 20 DEGREES
EKG VENTRICULAR RATE: 90 BPM
EOSINOPHIL # BLD: 0.07 K/UL (ref 0–0.4)
EOSINOPHIL NFR BLD: 1.2 % (ref 0–7)
ERYTHROCYTE [DISTWIDTH] IN BLOOD BY AUTOMATED COUNT: 13.5 % (ref 11.5–14.5)
GLUCOSE SERPL-MCNC: 103 MG/DL (ref 65–100)
HCT VFR BLD AUTO: 39.3 % (ref 35–47)
HGB BLD-MCNC: 12.2 G/DL (ref 11.5–16)
IMM GRANULOCYTES # BLD AUTO: 0.01 K/UL (ref 0–0.04)
IMM GRANULOCYTES NFR BLD AUTO: 0.2 % (ref 0–0.5)
LYMPHOCYTES # BLD: 1.85 K/UL (ref 0.8–3.5)
LYMPHOCYTES NFR BLD: 31.6 % (ref 12–49)
MCH RBC QN AUTO: 28.6 PG (ref 26–34)
MCHC RBC AUTO-ENTMCNC: 31 G/DL (ref 30–36.5)
MCV RBC AUTO: 92.3 FL (ref 80–99)
MONOCYTES # BLD: 0.27 K/UL (ref 0–1)
MONOCYTES NFR BLD: 4.6 % (ref 5–13)
NEUTS SEG # BLD: 3.65 K/UL (ref 1.8–8)
NEUTS SEG NFR BLD: 62.2 % (ref 32–75)
NRBC # BLD: 0 K/UL (ref 0–0.01)
NRBC BLD-RTO: 0 PER 100 WBC
PLATELET # BLD AUTO: 296 K/UL (ref 150–400)
PMV BLD AUTO: 10.1 FL (ref 8.9–12.9)
POTASSIUM SERPL-SCNC: 3.8 MMOL/L (ref 3.5–5.1)
RBC # BLD AUTO: 4.26 M/UL (ref 3.8–5.2)
SODIUM SERPL-SCNC: 142 MMOL/L (ref 136–145)
WBC # BLD AUTO: 5.9 K/UL (ref 3.6–11)

## 2025-03-07 PROCEDURE — 93010 ELECTROCARDIOGRAM REPORT: CPT | Performed by: INTERNAL MEDICINE

## 2025-03-07 PROCEDURE — 85025 COMPLETE CBC W/AUTO DIFF WBC: CPT

## 2025-03-07 PROCEDURE — 93005 ELECTROCARDIOGRAM TRACING: CPT | Performed by: SURGERY

## 2025-03-07 PROCEDURE — 80048 BASIC METABOLIC PNL TOTAL CA: CPT

## 2025-03-07 NOTE — PERIOP NOTE
[address] [address]      MAIN PRE OP             [phone]                                                                                AMBULATORY PRE OP          [phone]    PRE-ADMISSION TESTING    [phone]     Surgery Date:  03/19/2025        [facility] staff will call you between 4 and 7pm the day before your surgery with your arrival time. (If your surgery is on a Monday, we will call you the Friday before.)    Call [phone] after 7pm Monday-Friday if you did not receive this call.    INSTRUCTIONS BEFORE YOUR SURGERY   When You  Arrive Arrive at [facility] Patient Access on 1st floor the day of your surgery.    Have your insurance card, photo ID,living will/advanced   directive/POA (if applicable),  and any copayment (if needed)     Food   and   Drink NO solid food after midnight the night before surgery. You can drink clear liquids from midnight until ONE hour prior to your arrival at the hospital on the day of your surgery.     Clear liquids include:  Water  Apple juice (no sediment)  Carbonated beverages  Black coffee(no cream/milk)  Tea(no cream/milk)  Gatorade    No alcohol (beer, wine, liquor) or marijuana (smoking) 24 hours prior to surgery.   No edibles for 3 days prior to surgery.    Stop smoking cigarettes 14 days before surgery (helps w/healing and breathing).     Medications to   TAKE   Morning of Surgery MEDICATIONS TO TAKE THE MORNING OF SURGERY: NONE    You may take these medications, IF NEEDED, the morning of surgery: TYLENOL: LAST DOSE IS TO BE 4 HOURS PRIOR TO ARRIVAL TIME     Ask your surgeon/prescribing doctor for instructions on taking or stopping these medications prior to surgery: NONE     Medications to STOP  before surgery Non-Steroidal anti-inflammatory Drugs (NSAID's): for example, Diclofenac (Voltaren), Ibuprofen (Advil, Motrin), Naproxen (Aleve) 3 days STOP TAKING ON 03/16/25    STOP all herbal  supplements and vitamins(unless prescribed by your doctor), and fish oil for 7 days STOP TAKING ON 03/12/25: VIT B 12, MULTIVITAMIN, VIT D3     Other:NONE    (Pain medications not listed above, including Tylenol may be taken up until 4 hours prior to arrival time)     Blood  Thinners If you take Aspirin, Eliquis, Plavix, Coumadin, or any blood-thinning or anti-blood clot medicine, talk to the doctor who prescribed the medications for pre-operative instructions.    If you take aspirin or aspirin containing products for pain, stop 7 days prior to surgery STOP TAKING ON 03/12/25     Bathing Clothing  Jewelry  Valuables     When you shower the morning of surgery, please do not apply anything to your skin (lotions, powders) or makeup, (especially mascara).   No deodorant if you are having breast surgery.  Remove fingernail polish except for clear.  Do not shave or trim anywhere 24 hours before surgery  Wear your hair loose or down; no pony-tails, buns, or metal hair clips  Wear loose, comfortable, clean clothes  Wear glasses instead of contacts. Bring a case to keep your glasses safe.  Leave money, valuables, and jewelry, including body piercings, at home  If you use inhalers or CPAP machine, bring it with you the day of surgery.     Going Home - or Spending the Night OUTPATIENT SURGERY: You must have a responsible adult drive you home and stay with you 24 hours after surgery. You may not drive for 24 hours after surgery.    ADMITS: If your doctor is keeping you in the hospital after surgery, leave personal belongings/luggage in your car until you have a hospital room number.    Hospital discharge time is 12 noon  Drivers must be here before 12 noon unless you are told differently     Special Instructions Free  parking available from 6 AM until 4:30 PM.   FOLLOW ALL INSTRUCTIONS GIVEN BY SURGEONS OFFICE        Preventing Infections Before and After - Your Surgery    IMPORTANT INSTRUCTIONS      You play an important

## 2025-03-07 NOTE — PERIOP NOTE
INSTRUCTIONS GIVEN AND REVIEWED, 2 BOTTLES OF SOAP GIVEN, PT GIVEN TIME TO ASK QUESTIONS     EKG PERFORMED

## 2025-03-18 ENCOUNTER — ANESTHESIA EVENT (OUTPATIENT)
Facility: HOSPITAL | Age: 55
DRG: 581 | End: 2025-03-18
Payer: COMMERCIAL

## 2025-03-18 RX ORDER — MIDAZOLAM HYDROCHLORIDE 2 MG/2ML
2 INJECTION, SOLUTION INTRAMUSCULAR; INTRAVENOUS PRN
Status: CANCELLED | OUTPATIENT
Start: 2025-03-18

## 2025-03-18 RX ORDER — SODIUM CHLORIDE 0.9 % (FLUSH) 0.9 %
5-40 SYRINGE (ML) INJECTION EVERY 12 HOURS SCHEDULED
Status: CANCELLED | OUTPATIENT
Start: 2025-03-18

## 2025-03-18 RX ORDER — FENTANYL CITRATE 50 UG/ML
100 INJECTION, SOLUTION INTRAMUSCULAR; INTRAVENOUS
Refills: 0 | Status: CANCELLED | OUTPATIENT
Start: 2025-03-18 | End: 2025-03-19

## 2025-03-18 RX ORDER — ACETAMINOPHEN 500 MG
1000 TABLET ORAL ONCE
Status: CANCELLED | OUTPATIENT
Start: 2025-03-18 | End: 2025-03-18

## 2025-03-18 RX ORDER — SODIUM CHLORIDE 0.9 % (FLUSH) 0.9 %
5-40 SYRINGE (ML) INJECTION PRN
Status: CANCELLED | OUTPATIENT
Start: 2025-03-18

## 2025-03-18 RX ORDER — LIDOCAINE HYDROCHLORIDE 10 MG/ML
1 INJECTION, SOLUTION EPIDURAL; INFILTRATION; INTRACAUDAL; PERINEURAL
Status: CANCELLED | OUTPATIENT
Start: 2025-03-18 | End: 2025-03-19

## 2025-03-18 RX ORDER — SODIUM CHLORIDE 9 MG/ML
INJECTION, SOLUTION INTRAVENOUS PRN
Status: CANCELLED | OUTPATIENT
Start: 2025-03-18

## 2025-03-18 RX ORDER — SODIUM CHLORIDE, SODIUM LACTATE, POTASSIUM CHLORIDE, CALCIUM CHLORIDE 600; 310; 30; 20 MG/100ML; MG/100ML; MG/100ML; MG/100ML
INJECTION, SOLUTION INTRAVENOUS CONTINUOUS
Status: CANCELLED | OUTPATIENT
Start: 2025-03-18

## 2025-03-19 ENCOUNTER — ANESTHESIA (OUTPATIENT)
Facility: HOSPITAL | Age: 55
DRG: 581 | End: 2025-03-19
Payer: COMMERCIAL

## 2025-03-19 ENCOUNTER — HOSPITAL ENCOUNTER (INPATIENT)
Facility: HOSPITAL | Age: 55
LOS: 1 days | Discharge: HOME | DRG: 581 | End: 2025-03-20
Attending: SURGERY | Admitting: SURGERY
Payer: COMMERCIAL

## 2025-03-19 PROBLEM — Z41.1 ENCOUNTER FOR COSMETIC SURGERY: Status: ACTIVE | Noted: 2025-03-19

## 2025-03-19 PROCEDURE — 6360000002 HC RX W HCPCS: Performed by: PHYSICIAN ASSISTANT

## 2025-03-19 PROCEDURE — 2500000003 HC RX 250 WO HCPCS: Performed by: ANESTHESIOLOGY

## 2025-03-19 PROCEDURE — 2709999900 HC NON-CHARGEABLE SUPPLY: Performed by: SURGERY

## 2025-03-19 PROCEDURE — 0W0F0ZZ ALTERATION OF ABDOMINAL WALL, OPEN APPROACH: ICD-10-PCS | Performed by: SURGERY

## 2025-03-19 PROCEDURE — 6370000000 HC RX 637 (ALT 250 FOR IP): Performed by: PHYSICIAN ASSISTANT

## 2025-03-19 PROCEDURE — 3E033XZ INTRODUCTION OF VASOPRESSOR INTO PERIPHERAL VEIN, PERCUTANEOUS APPROACH: ICD-10-PCS | Performed by: SURGERY

## 2025-03-19 PROCEDURE — 3600000013 HC SURGERY LEVEL 3 ADDTL 15MIN: Performed by: SURGERY

## 2025-03-19 PROCEDURE — 3700000000 HC ANESTHESIA ATTENDED CARE: Performed by: SURGERY

## 2025-03-19 PROCEDURE — 3700000001 HC ADD 15 MINUTES (ANESTHESIA): Performed by: SURGERY

## 2025-03-19 PROCEDURE — 7100000001 HC PACU RECOVERY - ADDTL 15 MIN: Performed by: SURGERY

## 2025-03-19 PROCEDURE — 7100000000 HC PACU RECOVERY - FIRST 15 MIN: Performed by: SURGERY

## 2025-03-19 PROCEDURE — 3600000003 HC SURGERY LEVEL 3 BASE: Performed by: SURGERY

## 2025-03-19 PROCEDURE — 6360000002 HC RX W HCPCS: Performed by: ANESTHESIOLOGY

## 2025-03-19 PROCEDURE — 2500000003 HC RX 250 WO HCPCS: Performed by: PHYSICIAN ASSISTANT

## 2025-03-19 PROCEDURE — 2500000003 HC RX 250 WO HCPCS: Performed by: SURGERY

## 2025-03-19 PROCEDURE — 2580000003 HC RX 258: Performed by: SURGERY

## 2025-03-19 PROCEDURE — 2580000003 HC RX 258: Performed by: ANESTHESIOLOGY

## 2025-03-19 PROCEDURE — 6360000002 HC RX W HCPCS: Performed by: SURGERY

## 2025-03-19 PROCEDURE — 2580000003 HC RX 258: Performed by: PHYSICIAN ASSISTANT

## 2025-03-19 PROCEDURE — 1120000000 HC RM PRIVATE OB

## 2025-03-19 RX ORDER — SODIUM CHLORIDE, SODIUM LACTATE, POTASSIUM CHLORIDE, CALCIUM CHLORIDE 600; 310; 30; 20 MG/100ML; MG/100ML; MG/100ML; MG/100ML
INJECTION, SOLUTION INTRAVENOUS CONTINUOUS
Status: DISCONTINUED | OUTPATIENT
Start: 2025-03-19 | End: 2025-03-20 | Stop reason: HOSPADM

## 2025-03-19 RX ORDER — SODIUM CHLORIDE 0.9 % (FLUSH) 0.9 %
5-40 SYRINGE (ML) INJECTION PRN
Status: DISCONTINUED | OUTPATIENT
Start: 2025-03-19 | End: 2025-03-20 | Stop reason: HOSPADM

## 2025-03-19 RX ORDER — MIDAZOLAM HYDROCHLORIDE 1 MG/ML
INJECTION, SOLUTION INTRAMUSCULAR; INTRAVENOUS
Status: DISCONTINUED | OUTPATIENT
Start: 2025-03-19 | End: 2025-03-19 | Stop reason: SDUPTHER

## 2025-03-19 RX ORDER — SODIUM CHLORIDE 9 MG/ML
INJECTION, SOLUTION INTRAVENOUS PRN
Status: DISCONTINUED | OUTPATIENT
Start: 2025-03-19 | End: 2025-03-19 | Stop reason: HOSPADM

## 2025-03-19 RX ORDER — HYDRALAZINE HYDROCHLORIDE 20 MG/ML
10 INJECTION INTRAMUSCULAR; INTRAVENOUS EVERY 10 MIN PRN
Status: DISCONTINUED | OUTPATIENT
Start: 2025-03-19 | End: 2025-03-19 | Stop reason: HOSPADM

## 2025-03-19 RX ORDER — DIPHENHYDRAMINE HCL 25 MG
25 CAPSULE ORAL EVERY 6 HOURS PRN
Status: DISCONTINUED | OUTPATIENT
Start: 2025-03-19 | End: 2025-03-20 | Stop reason: HOSPADM

## 2025-03-19 RX ORDER — SODIUM CHLORIDE 0.9 % (FLUSH) 0.9 %
5-40 SYRINGE (ML) INJECTION EVERY 12 HOURS SCHEDULED
Status: DISCONTINUED | OUTPATIENT
Start: 2025-03-19 | End: 2025-03-20 | Stop reason: HOSPADM

## 2025-03-19 RX ORDER — DOCUSATE SODIUM 100 MG/1
200 CAPSULE, LIQUID FILLED ORAL DAILY
Status: DISCONTINUED | OUTPATIENT
Start: 2025-03-19 | End: 2025-03-20 | Stop reason: HOSPADM

## 2025-03-19 RX ORDER — PROPOFOL 10 MG/ML
INJECTION, EMULSION INTRAVENOUS
Status: DISCONTINUED | OUTPATIENT
Start: 2025-03-19 | End: 2025-03-19 | Stop reason: SDUPTHER

## 2025-03-19 RX ORDER — PROCHLORPERAZINE EDISYLATE 5 MG/ML
5 INJECTION INTRAMUSCULAR; INTRAVENOUS
Status: DISCONTINUED | OUTPATIENT
Start: 2025-03-19 | End: 2025-03-19 | Stop reason: HOSPADM

## 2025-03-19 RX ORDER — PHENYLEPHRINE HCL IN 0.9% NACL 0.4MG/10ML
SYRINGE (ML) INTRAVENOUS
Status: DISCONTINUED | OUTPATIENT
Start: 2025-03-19 | End: 2025-03-19 | Stop reason: SDUPTHER

## 2025-03-19 RX ORDER — PROCHLORPERAZINE EDISYLATE 5 MG/ML
10 INJECTION INTRAMUSCULAR; INTRAVENOUS EVERY 6 HOURS PRN
Status: DISCONTINUED | OUTPATIENT
Start: 2025-03-19 | End: 2025-03-20 | Stop reason: HOSPADM

## 2025-03-19 RX ORDER — DEXAMETHASONE SODIUM PHOSPHATE 4 MG/ML
INJECTION, SOLUTION INTRA-ARTICULAR; INTRALESIONAL; INTRAMUSCULAR; INTRAVENOUS; SOFT TISSUE
Status: DISCONTINUED | OUTPATIENT
Start: 2025-03-19 | End: 2025-03-19 | Stop reason: SDUPTHER

## 2025-03-19 RX ORDER — SODIUM CHLORIDE 0.9 % (FLUSH) 0.9 %
5-40 SYRINGE (ML) INJECTION EVERY 12 HOURS SCHEDULED
Status: DISCONTINUED | OUTPATIENT
Start: 2025-03-19 | End: 2025-03-19 | Stop reason: HOSPADM

## 2025-03-19 RX ORDER — HYDROCODONE BITARTRATE AND ACETAMINOPHEN 7.5; 325 MG/1; MG/1
1 TABLET ORAL EVERY 6 HOURS PRN
Status: DISCONTINUED | OUTPATIENT
Start: 2025-03-19 | End: 2025-03-20

## 2025-03-19 RX ORDER — ONDANSETRON 4 MG/1
4 TABLET, ORALLY DISINTEGRATING ORAL EVERY 8 HOURS PRN
Status: DISCONTINUED | OUTPATIENT
Start: 2025-03-19 | End: 2025-03-20 | Stop reason: HOSPADM

## 2025-03-19 RX ORDER — ROCURONIUM BROMIDE 10 MG/ML
INJECTION, SOLUTION INTRAVENOUS
Status: DISCONTINUED | OUTPATIENT
Start: 2025-03-19 | End: 2025-03-19 | Stop reason: SDUPTHER

## 2025-03-19 RX ORDER — SODIUM CHLORIDE, SODIUM LACTATE, POTASSIUM CHLORIDE, CALCIUM CHLORIDE 600; 310; 30; 20 MG/100ML; MG/100ML; MG/100ML; MG/100ML
INJECTION, SOLUTION INTRAVENOUS
Status: DISCONTINUED | OUTPATIENT
Start: 2025-03-19 | End: 2025-03-19 | Stop reason: SDUPTHER

## 2025-03-19 RX ORDER — DIAZEPAM 5 MG/1
5 TABLET ORAL EVERY 6 HOURS PRN
Status: DISCONTINUED | OUTPATIENT
Start: 2025-03-19 | End: 2025-03-20 | Stop reason: HOSPADM

## 2025-03-19 RX ORDER — ONDANSETRON 2 MG/ML
INJECTION INTRAMUSCULAR; INTRAVENOUS
Status: DISCONTINUED | OUTPATIENT
Start: 2025-03-19 | End: 2025-03-19 | Stop reason: SDUPTHER

## 2025-03-19 RX ORDER — EPHEDRINE SULFATE 50 MG/ML
INJECTION INTRAVENOUS
Status: DISCONTINUED | OUTPATIENT
Start: 2025-03-19 | End: 2025-03-19 | Stop reason: SDUPTHER

## 2025-03-19 RX ORDER — TRANEXAMIC ACID 100 MG/ML
INJECTION, SOLUTION INTRAVENOUS
Status: DISCONTINUED | OUTPATIENT
Start: 2025-03-19 | End: 2025-03-19 | Stop reason: SDUPTHER

## 2025-03-19 RX ORDER — SODIUM CHLORIDE 9 MG/ML
INJECTION, SOLUTION INTRAVENOUS PRN
Status: DISCONTINUED | OUTPATIENT
Start: 2025-03-19 | End: 2025-03-20 | Stop reason: HOSPADM

## 2025-03-19 RX ORDER — FENTANYL CITRATE 50 UG/ML
25 INJECTION, SOLUTION INTRAMUSCULAR; INTRAVENOUS EVERY 5 MIN PRN
Status: DISCONTINUED | OUTPATIENT
Start: 2025-03-19 | End: 2025-03-19 | Stop reason: HOSPADM

## 2025-03-19 RX ORDER — DIPHENHYDRAMINE HYDROCHLORIDE 50 MG/ML
INJECTION, SOLUTION INTRAMUSCULAR; INTRAVENOUS
Status: DISCONTINUED | OUTPATIENT
Start: 2025-03-19 | End: 2025-03-19 | Stop reason: SDUPTHER

## 2025-03-19 RX ORDER — HYDROMORPHONE HYDROCHLORIDE 1 MG/ML
1 INJECTION, SOLUTION INTRAMUSCULAR; INTRAVENOUS; SUBCUTANEOUS
Status: DISCONTINUED | OUTPATIENT
Start: 2025-03-19 | End: 2025-03-20 | Stop reason: HOSPADM

## 2025-03-19 RX ORDER — HYDROMORPHONE HYDROCHLORIDE 2 MG/ML
INJECTION, SOLUTION INTRAMUSCULAR; INTRAVENOUS; SUBCUTANEOUS
Status: DISCONTINUED | OUTPATIENT
Start: 2025-03-19 | End: 2025-03-19 | Stop reason: SDUPTHER

## 2025-03-19 RX ORDER — GINSENG 100 MG
CAPSULE ORAL DAILY
Status: DISCONTINUED | OUTPATIENT
Start: 2025-03-19 | End: 2025-03-20 | Stop reason: HOSPADM

## 2025-03-19 RX ORDER — LURASIDONE HYDROCHLORIDE 20 MG/1
20 TABLET, FILM COATED ORAL NIGHTLY
Status: DISCONTINUED | OUTPATIENT
Start: 2025-03-19 | End: 2025-03-20 | Stop reason: HOSPADM

## 2025-03-19 RX ORDER — FENTANYL CITRATE 50 UG/ML
INJECTION, SOLUTION INTRAMUSCULAR; INTRAVENOUS
Status: DISCONTINUED | OUTPATIENT
Start: 2025-03-19 | End: 2025-03-19 | Stop reason: SDUPTHER

## 2025-03-19 RX ORDER — AMLODIPINE BESYLATE 5 MG/1
5 TABLET ORAL NIGHTLY
Status: DISCONTINUED | OUTPATIENT
Start: 2025-03-19 | End: 2025-03-20 | Stop reason: HOSPADM

## 2025-03-19 RX ORDER — SODIUM CHLORIDE 9 MG/ML
INJECTION, SOLUTION INTRAVENOUS
Status: DISCONTINUED | OUTPATIENT
Start: 2025-03-19 | End: 2025-03-19

## 2025-03-19 RX ORDER — LIDOCAINE HYDROCHLORIDE 20 MG/ML
INJECTION, SOLUTION EPIDURAL; INFILTRATION; INTRACAUDAL; PERINEURAL
Status: DISCONTINUED | OUTPATIENT
Start: 2025-03-19 | End: 2025-03-19 | Stop reason: SDUPTHER

## 2025-03-19 RX ORDER — NALOXONE HYDROCHLORIDE 0.4 MG/ML
INJECTION, SOLUTION INTRAMUSCULAR; INTRAVENOUS; SUBCUTANEOUS PRN
Status: DISCONTINUED | OUTPATIENT
Start: 2025-03-19 | End: 2025-03-19 | Stop reason: HOSPADM

## 2025-03-19 RX ORDER — SODIUM CHLORIDE 9 MG/ML
INJECTION, SOLUTION INTRAVENOUS
Status: DISCONTINUED | OUTPATIENT
Start: 2025-03-19 | End: 2025-03-19 | Stop reason: SDUPTHER

## 2025-03-19 RX ORDER — DOXEPIN HYDROCHLORIDE 25 MG/1
25 CAPSULE ORAL NIGHTLY
Status: DISCONTINUED | OUTPATIENT
Start: 2025-03-19 | End: 2025-03-20 | Stop reason: HOSPADM

## 2025-03-19 RX ORDER — SODIUM CHLORIDE 0.9 % (FLUSH) 0.9 %
5-40 SYRINGE (ML) INJECTION PRN
Status: DISCONTINUED | OUTPATIENT
Start: 2025-03-19 | End: 2025-03-19 | Stop reason: HOSPADM

## 2025-03-19 RX ORDER — HYDROMORPHONE HYDROCHLORIDE 1 MG/ML
0.5 INJECTION, SOLUTION INTRAMUSCULAR; INTRAVENOUS; SUBCUTANEOUS EVERY 5 MIN PRN
Status: DISCONTINUED | OUTPATIENT
Start: 2025-03-19 | End: 2025-03-19 | Stop reason: HOSPADM

## 2025-03-19 RX ORDER — ONDANSETRON 2 MG/ML
4 INJECTION INTRAMUSCULAR; INTRAVENOUS EVERY 6 HOURS PRN
Status: DISCONTINUED | OUTPATIENT
Start: 2025-03-19 | End: 2025-03-20 | Stop reason: HOSPADM

## 2025-03-19 RX ORDER — ONDANSETRON 2 MG/ML
4 INJECTION INTRAMUSCULAR; INTRAVENOUS
Status: DISCONTINUED | OUTPATIENT
Start: 2025-03-19 | End: 2025-03-19 | Stop reason: HOSPADM

## 2025-03-19 RX ADMIN — LIDOCAINE HYDROCHLORIDE 80 MG: 20 INJECTION, SOLUTION EPIDURAL; INFILTRATION; INTRACAUDAL; PERINEURAL at 07:50

## 2025-03-19 RX ADMIN — HYDROMORPHONE HYDROCHLORIDE 1 MG: 1 INJECTION, SOLUTION INTRAMUSCULAR; INTRAVENOUS; SUBCUTANEOUS at 14:17

## 2025-03-19 RX ADMIN — SODIUM CHLORIDE, POTASSIUM CHLORIDE, SODIUM LACTATE AND CALCIUM CHLORIDE: 600; 310; 30; 20 INJECTION, SOLUTION INTRAVENOUS at 09:27

## 2025-03-19 RX ADMIN — HYDROMORPHONE HYDROCHLORIDE 0.5 MG: 2 INJECTION, SOLUTION INTRAMUSCULAR; INTRAVENOUS; SUBCUTANEOUS at 08:48

## 2025-03-19 RX ADMIN — Medication 80 MCG: at 10:54

## 2025-03-19 RX ADMIN — FENTANYL CITRATE 50 MCG: 50 INJECTION INTRAMUSCULAR; INTRAVENOUS at 07:50

## 2025-03-19 RX ADMIN — Medication 80 MCG: at 10:10

## 2025-03-19 RX ADMIN — WATER 2000 MG: 1 INJECTION INTRAMUSCULAR; INTRAVENOUS; SUBCUTANEOUS at 07:50

## 2025-03-19 RX ADMIN — AMLODIPINE BESYLATE 5 MG: 5 TABLET ORAL at 20:48

## 2025-03-19 RX ADMIN — DEXAMETHASONE SODIUM PHOSPHATE 4 MG: 4 INJECTION INTRA-ARTICULAR; INTRALESIONAL; INTRAMUSCULAR; INTRAVENOUS; SOFT TISSUE at 07:50

## 2025-03-19 RX ADMIN — Medication 80 MCG: at 10:26

## 2025-03-19 RX ADMIN — MIDAZOLAM 2 MG: 1 INJECTION INTRAMUSCULAR; INTRAVENOUS at 07:35

## 2025-03-19 RX ADMIN — PHENYLEPHRINE HYDROCHLORIDE 25 MCG/MIN: 10 INJECTION INTRAVENOUS at 11:40

## 2025-03-19 RX ADMIN — SODIUM CHLORIDE, POTASSIUM CHLORIDE, SODIUM LACTATE AND CALCIUM CHLORIDE: 600; 310; 30; 20 INJECTION, SOLUTION INTRAVENOUS at 07:35

## 2025-03-19 RX ADMIN — Medication 80 MCG: at 10:34

## 2025-03-19 RX ADMIN — ROCURONIUM BROMIDE 10 MG: 10 INJECTION, SOLUTION INTRAVENOUS at 08:48

## 2025-03-19 RX ADMIN — VORTIOXETINE 20 MG: 20 TABLET, FILM COATED ORAL at 20:48

## 2025-03-19 RX ADMIN — Medication 80 MCG: at 11:11

## 2025-03-19 RX ADMIN — HYDROMORPHONE HYDROCHLORIDE 0.5 MG: 2 INJECTION, SOLUTION INTRAMUSCULAR; INTRAVENOUS; SUBCUTANEOUS at 08:49

## 2025-03-19 RX ADMIN — LURASIDONE HYDROCHLORIDE 20 MG: 20 TABLET, FILM COATED ORAL at 20:48

## 2025-03-19 RX ADMIN — SODIUM CHLORIDE: 9 INJECTION, SOLUTION INTRAVENOUS at 11:59

## 2025-03-19 RX ADMIN — Medication 80 MCG: at 10:29

## 2025-03-19 RX ADMIN — PROPOFOL 100 MG: 10 INJECTION, EMULSION INTRAVENOUS at 07:50

## 2025-03-19 RX ADMIN — HYDROMORPHONE HYDROCHLORIDE 1 MG: 1 INJECTION, SOLUTION INTRAMUSCULAR; INTRAVENOUS; SUBCUTANEOUS at 20:50

## 2025-03-19 RX ADMIN — Medication 80 MCG: at 11:23

## 2025-03-19 RX ADMIN — ROCURONIUM BROMIDE 30 MG: 10 INJECTION, SOLUTION INTRAVENOUS at 07:50

## 2025-03-19 RX ADMIN — DOCUSATE SODIUM 200 MG: 100 CAPSULE, LIQUID FILLED ORAL at 14:17

## 2025-03-19 RX ADMIN — EPHEDRINE SULFATE 10 MG: 50 INJECTION INTRAVENOUS at 11:11

## 2025-03-19 RX ADMIN — EPHEDRINE SULFATE 10 MG: 50 INJECTION INTRAVENOUS at 10:49

## 2025-03-19 RX ADMIN — EPHEDRINE SULFATE 10 MG: 50 INJECTION INTRAVENOUS at 10:44

## 2025-03-19 RX ADMIN — Medication 100 MCG: at 11:39

## 2025-03-19 RX ADMIN — WATER 2000 MG: 1 INJECTION INTRAMUSCULAR; INTRAVENOUS; SUBCUTANEOUS at 11:50

## 2025-03-19 RX ADMIN — DIPHENHYDRAMINE HYDROCHLORIDE 12.5 MG: 50 INJECTION INTRAMUSCULAR; INTRAVENOUS at 07:50

## 2025-03-19 RX ADMIN — EPHEDRINE SULFATE 10 MG: 50 INJECTION INTRAVENOUS at 10:37

## 2025-03-19 RX ADMIN — Medication 80 MCG: at 10:44

## 2025-03-19 RX ADMIN — DOXEPIN HYDROCHLORIDE 25 MG: 25 CAPSULE ORAL at 20:48

## 2025-03-19 RX ADMIN — Medication 80 MCG: at 11:26

## 2025-03-19 RX ADMIN — EPHEDRINE SULFATE 10 MG: 50 INJECTION INTRAVENOUS at 11:02

## 2025-03-19 RX ADMIN — TRANEXAMIC ACID 1000 MG: 1 INJECTION, SOLUTION INTRAVENOUS at 11:14

## 2025-03-19 RX ADMIN — ONDANSETRON 4 MG: 2 INJECTION, SOLUTION INTRAMUSCULAR; INTRAVENOUS at 12:30

## 2025-03-19 RX ADMIN — CEPHALEXIN 500 MG: 250 CAPSULE ORAL at 17:39

## 2025-03-19 RX ADMIN — Medication 80 MCG: at 11:02

## 2025-03-19 ASSESSMENT — PAIN SCALES - GENERAL
PAINLEVEL_OUTOF10: 4
PAINLEVEL_OUTOF10: 8
PAINLEVEL_OUTOF10: 0

## 2025-03-19 NOTE — OP NOTE
Vicryl sutures were placed through the dermis with the knots buried.  A running 3-0 Monocryl subcuticular suture was placed along the entire lower abdominal incision.  Interrupted 3-0 Monocryl was placed through the dermis with the knots buried around the umbilicus, and a running 4-0 Monocryl subcuticular suture was then used around the umbilicus. Several interrupted 4-0 monocryl sutures were also placed around the umbilicus. Several 2-0 Nylon sutures were placed through the skin at key locations on the lower abdominal incision. Due to the patients adhesive allergy, cyanoacrylate glue/tape or steri-strips were not used, Sterile dressings and compressive binders were applied.  All sponge, needle, and instrument counts were correct at the end of the case.  The patient tolerated the procedure well and was transported in stable condition to the recovery room.      PERLA TOBIAS MD

## 2025-03-19 NOTE — H&P
CYSTOSCOPY      Family history:   Family History   Problem Relation Age of Onset    Kidney Disease Mother         ON DIALYSIS    Hypertension Mother     Heart Disease Mother     No Known Problems Father     Rheum Arthritis Paternal Aunt     Asthma Daughter     No Known Problems Son     Asthma Son     Anesth Problems Neg Hx       Social history:   Social History     Socioeconomic History    Marital status: Legally      Spouse name: Not on file    Number of children: Not on file    Years of education: Not on file    Highest education level: Not on file   Occupational History    Not on file   Tobacco Use    Smoking status: Never     Passive exposure: Never    Smokeless tobacco: Never   Vaping Use    Vaping status: Never Used   Substance and Sexual Activity    Alcohol use: Yes     Comment: socially    Drug use: Never    Sexual activity: Not on file   Other Topics Concern    Not on file   Social History Narrative    Recently moved from [place], they have been here since April.   .      Social Drivers of Health     Financial Resource Strain: Low Risk  (3/7/2024)    Overall Financial Resource Strain (CARDIA)     Difficulty of Paying Living Expenses: Not hard at all   Food Insecurity: No Food Insecurity (3/7/2024)    Hunger Vital Sign     Worried About Running Out of Food in the Last Year: Never true     Ran Out of Food in the Last Year: Never true   Transportation Needs: Unknown (3/7/2024)    PRAPARE - Transportation     Lack of Transportation (Medical): Not on file     Lack of Transportation (Non-Medical): No   Physical Activity: Not on file   Stress: Not on file   Social Connections: Not on file   Intimate Partner Violence: Not on file   Housing Stability: Unknown (3/7/2024)    Housing Stability Vital Sign     Unable to Pay for Housing in the Last Year: Not on file     Number of Places Lived in the Last Year: Not on file     Unstable Housing in the Last Year: No      Home Medications:   Prior to Admission

## 2025-03-19 NOTE — ANESTHESIA PRE PROCEDURE
Department of Anesthesiology  Preprocedure Note       Name:  Tamra Beckford   Age:  54 y.o.  :  1970                                          MRN:  [id number]         Date:  3/19/2025      Surgeon: Surgeon(s):  Milton Woodward MD    Procedure: Procedure(s):  ABDOMINOPLASTY WITH LIPOSUCTION    Medications prior to admission:   Prior to Admission medications    Medication Sig Start Date End Date Taking? Authorizing Provider   Multiple Vitamin (MULTIVITAMIN PO) Take 1 tablet by mouth daily   Yes ProviderAbhinav MD   vitamin B-12 (CYANOCOBALAMIN) 1000 MCG tablet Take 1 tablet by mouth daily as needed   Yes ProviderAbhinav MD   doxepin (SINEQUAN) 25 MG capsule Take 1 capsule by mouth nightly 24  Yes ProviderAbhinav MD   Cholecalciferol (VITAMIN D3) 50 MCG (2000) CAPS Take 1 capsule by mouth daily as needed   Yes ProviderAbhinav MD   amLODIPine (NORVASC) 5 MG tablet Take 1 tablet by mouth daily  Patient taking differently: Take 1 tablet by mouth nightly 3/7/24  Yes Artur Santana APRN - NP   lurasidone (LATUDA) 20 MG TABS tablet Take 1 tablet by mouth nightly 21  Yes Automatic Reconciliation, Ar   VORTIoxetine HBr (TRINTELLIX) 20 MG TABS tablet Take 1 tablet by mouth nightly 20  Yes Automatic Reconciliation, Ar       Current medications:    No current facility-administered medications for this encounter.       Allergies:    Allergies   Allergen Reactions   • Latex Other (See Comments)     Rash, skin breakdown   • Diazepam Other (See Comments)     Not an allergy, just does not work for her at all.   • Oxycodone-Acetaminophen Nausea Only and Other (See Comments)     headache   • Adhesive Tape Itching and Rash   • Hydrocodone Nausea And Vomiting and Dizziness or Vertigo       Problem List:    Patient Active Problem List   Diagnosis Code   • History of partial hysterectomy Z90.711   • Murmur, cardiac R01.1   • Weight gain R63.5   • Hot flashes R23.2   • Gastroesophageal

## 2025-03-19 NOTE — BRIEF OP NOTE
Brief Postoperative Note      Patient: Tamra Beckford  YOB: 1970  MRN: [id number]    Date of Procedure: 3/19/2025    Pre-Op Diagnosis Codes:      * Encounter for cosmetic surgery [Z41.1]    Post-Op Diagnosis: Same       Procedure(s):  ABDOMINOPLASTY WITH LIPOSUCTION    Surgeon(s):  iMlton Woodward MD    Assistant:  Physician Assistant: Hemalatha Verma PA-C    Anesthesia: General    Estimated Blood Loss (mL): 50mL    Complications: None    Specimens:   * No specimens in log *    Implants:  * No implants in log *      Drains:   Closed/Suction Drain Right;Anterior Abdomen Bulb (Active)       Closed/Suction Drain Left;Anterior Abdomen Bulb (Active)       Urinary Catheter 03/19/25 Woodall (Active)       Findings:  Infection Present At Time Of Surgery (PATOS) (choose all levels that have infection present):  No infection present  Other Findings: excess skin and fat of the lower abdomen  Tumescent: 2500mL  Liposuction aspirate: 1200mL    Electronically signed by Hemalatha Verma PA-C on 3/19/2025 at 12:56 PM

## 2025-03-19 NOTE — ANESTHESIA POSTPROCEDURE EVALUATION
Post-Anesthesia Evaluation and Assessment    Patient: Tamra Beckford MRN: [id number]  SSN: [id number]    YOB: 1970  Age: 54 y.o.  Sex: female      I have evaluated the patient and they are stable and ready for discharge from the PACU.     Cardiovascular Function/Vital Signs  Visit Vitals  BP (!) 145/96   Pulse 92   Temp 97.8 °F (36.6 °C) (Oral)   Resp 14   Ht 1.626 m (5' 4\")   Wt 76.2 kg (168 lb)   SpO2 97%   BMI 28.84 kg/m²       Patient is status post General anesthesia for Procedure(s):  ABDOMINOPLASTY WITH LIPOSUCTION.    Nausea/Vomiting: None    Postoperative hydration reviewed and adequate.    Pain:  Managed    Neurological Status:   At baseline    Mental Status, Level of Consciousness: Alert and  oriented to person, place, and time    Pulmonary Status:   Adequate oxygenation and airway patent    Complications related to anesthesia: None    Post-anesthesia assessment completed. No concerns    Signed By: Charlie Moore MD     March 19, 2025

## 2025-03-19 NOTE — PERIOP NOTE
TRANSFER - OUT REPORT:    Verbal report given to Crista on Tamra Beckford  being transferred to [facility] for routine post-op       Report consisted of patient's Situation, Background, Assessment and   Recommendations(SBAR).     Information from the following report(s) Surgery Report was reviewed with the receiving nurse.           Lines:   Peripheral IV 03/19/25 Left;Posterior Hand (Active)   Site Assessment Clean, dry & intact 03/19/25 1253   Line Status Capped;Flushed 03/19/25 1253   Phlebitis Assessment No symptoms 03/19/25 1253   Infiltration Assessment 0 03/19/25 1253   Alcohol Cap Used Yes 03/19/25 1253   Dressing Status Clean, dry & intact 03/19/25 1253   Dressing Type Transparent 03/19/25 1253       Peripheral IV 03/19/25 Right Arm (Active)   Site Assessment Clean, dry & intact 03/19/25 1253   Line Status Infusing;Flushed 03/19/25 1253   Phlebitis Assessment No symptoms 03/19/25 1253   Infiltration Assessment 0 03/19/25 1253   Dressing Status Clean, dry & intact 03/19/25 1253   Dressing Type Transparent 03/19/25 1253        Opportunity for questions and clarification was provided.      Patient transported with:  Tech

## 2025-03-20 VITALS
OXYGEN SATURATION: 95 % | WEIGHT: 168 LBS | TEMPERATURE: 98.6 F | DIASTOLIC BLOOD PRESSURE: 77 MMHG | HEIGHT: 64 IN | RESPIRATION RATE: 16 BRPM | BODY MASS INDEX: 28.68 KG/M2 | SYSTOLIC BLOOD PRESSURE: 110 MMHG | HEART RATE: 102 BPM

## 2025-03-20 PROCEDURE — 6370000000 HC RX 637 (ALT 250 FOR IP): Performed by: PHYSICIAN ASSISTANT

## 2025-03-20 PROCEDURE — 6360000002 HC RX W HCPCS: Performed by: PHYSICIAN ASSISTANT

## 2025-03-20 PROCEDURE — 6370000000 HC RX 637 (ALT 250 FOR IP): Performed by: SURGERY

## 2025-03-20 RX ORDER — HYDROCODONE BITARTRATE AND ACETAMINOPHEN 5; 325 MG/1; MG/1
2 TABLET ORAL EVERY 6 HOURS PRN
Refills: 0 | Status: DISCONTINUED | OUTPATIENT
Start: 2025-03-20 | End: 2025-03-20 | Stop reason: HOSPADM

## 2025-03-20 RX ADMIN — ONDANSETRON 4 MG: 2 INJECTION, SOLUTION INTRAMUSCULAR; INTRAVENOUS at 08:42

## 2025-03-20 RX ADMIN — CEPHALEXIN 500 MG: 250 CAPSULE ORAL at 00:00

## 2025-03-20 RX ADMIN — CEPHALEXIN 500 MG: 250 CAPSULE ORAL at 05:45

## 2025-03-20 RX ADMIN — HYDROMORPHONE HYDROCHLORIDE 1 MG: 1 INJECTION, SOLUTION INTRAMUSCULAR; INTRAVENOUS; SUBCUTANEOUS at 03:25

## 2025-03-20 RX ADMIN — DOCUSATE SODIUM 200 MG: 100 CAPSULE, LIQUID FILLED ORAL at 08:40

## 2025-03-20 RX ADMIN — HYDROMORPHONE HYDROCHLORIDE 1 MG: 1 INJECTION, SOLUTION INTRAMUSCULAR; INTRAVENOUS; SUBCUTANEOUS at 00:00

## 2025-03-20 RX ADMIN — HYDROCODONE BITARTRATE AND ACETAMINOPHEN 1 TABLET: 5; 325 TABLET ORAL at 08:41

## 2025-03-20 RX ADMIN — HYDROMORPHONE HYDROCHLORIDE 1 MG: 1 INJECTION, SOLUTION INTRAMUSCULAR; INTRAVENOUS; SUBCUTANEOUS at 06:29

## 2025-03-20 RX ADMIN — DIAZEPAM 5 MG: 5 TABLET ORAL at 05:45

## 2025-03-20 NOTE — DISCHARGE INSTRUCTIONS
Hospital   [address], [address], [address]       I acknowledge receipt of the above discharge instructions:      Patient/Guardian Signature _______________________________________ Date___________________    Nurse’s Signature_______________________________________________ Date___________________

## 2025-03-20 NOTE — DISCHARGE SUMMARY
directed    Follow up in 1 week to Dr Woodward's outpatient office    Signed:  Hemalatha Verma PA-C  3/20/2025  8:25 AM

## 2025-03-20 NOTE — PROGRESS NOTES
1340- TRANSFER - IN REPORT:    Verbal report received from Ambulatory on Tamra Beckford  being received from RON Oliver RN for routine post-op      Report consisted of patient's Situation, Background, Assessment and   Recommendations(SBAR).     Information from the following report(s) Nurse Handoff Report, Index, Adult Overview, Surgery Report, Intake/Output, MAR, Recent Results, and Med Rec Status was reviewed with the receiving nurse.    Opportunity for questions and clarification was provided.      Assessment completed upon patient's arrival to unit and care assumed.    
Bedside and Verbal shift change report given to ABDOULAYE Robert RN (oncoming nurse) by SCARLETT Hogan RN (offgoing nurse). Report included the following information Nurse Handoff Report, Index, Adult Overview, Surgery Report, Intake/Output, MAR, and Recent Results.    1118- I have reviewed discharge instructions with patient. Patient verbalized understanding and all questions answered     
Bedside and Verbal shift change report given to Irma BURNS RN (oncoming nurse) by Crista ALEJO RN (offgoing nurse). Report included the following information Nurse Handoff Report, Index, Intake/Output, MAR, and Recent Results.    
gallops/rubs/murmurs  Lungs: Clear to auscultation bilaterally  Abdomen: Soft, nontender, nondistended. Bowel sounds x4.   Extremities: moves all well without difficulty  Wounds: evaluation of the lower abdomen demonstrates a healing incision line with no dehiscence, no cellulitis, flap appears viable, no hematoma or seroma, 2 drains in place with serosanguinous drainage.    ASSESSMENT   Principal Problem:    Encounter for cosmetic surgery  Resolved Problems:    * No resolved hospital problems. *      PLAN  Continue supportive care.  Teach how to inject lovenox at home, will begin tomorrow 3/21/25 at home (already has prescription)  Continue drain care, teach how to drain at home  Dressings changed today with plastics team using bacitracin, 4x4s, abd pads and medipore tape with binder  Patient may d/c home today but must have voided since clark removal, would like for patient to get up and walk with nursing and be on oral meds before discharge.    Direct supervision by Dr Milton Woodward      Electronically signed by Hemalatha Verma PA-C  on 3/20/2025 at 8:20 AM

## 2025-03-21 NOTE — ADT AUTH CERT
[facility]  [address] [place] [id number]  NPI:  [id number]  TID: [id number]  Auth number:  [id number]  - PLEASE CANCEL AND VOID  IP AUTH THAT WAS SUBMITTED UNDER REF# [id number]  MEMBER ID [id number]    Return Contact Information:  Enid Sanchez  Phone :[phone]  Fax : [phone]

## (undated) DEVICE — INSULATED BLADE ELECTRODE: Brand: EDGE

## (undated) DEVICE — Device

## (undated) DEVICE — SYRINGE MED 10ML LUERLOCK TIP W/O SFTY DISP

## (undated) DEVICE — SUTURE PDS II SZ 1 L36IN ABSRB VLT CT L40MM 1/2 CIR TAPR Z359T

## (undated) DEVICE — STRAP,POSITIONING,KNEE/BODY,FOAM,4X60": Brand: MEDLINE

## (undated) DEVICE — 1LYRTR 16FR10ML100%SIL UMS SNP: Brand: MEDLINE INDUSTRIES, INC.

## (undated) DEVICE — DRAPE,LAPAROTOMY,T,PEDI,STERILE: Brand: MEDLINE

## (undated) DEVICE — SKIN TEMPERATURE SENSOR: Brand: DEROYAL

## (undated) DEVICE — SUTURE VICRYL + SZ 0 L27IN ANTIBACTERIAL POLYGLACTIN 910 W VCP280H

## (undated) DEVICE — BLUNTFILL: Brand: MONOJECT

## (undated) DEVICE — DRESSING,GAUZE,XEROFORM,CURAD,5"X9",ST: Brand: CURAD

## (undated) DEVICE — SUTURE VICRYL + SZ 0 L27IN ABSRB WHT CT-2 1/2 CIR TAPERPOINT VCP270H

## (undated) DEVICE — PACK,ORTOHMAX/CVMAX,UNIVERSAL,5/CS: Brand: MEDLINE

## (undated) DEVICE — TUBING HYDR IRR --

## (undated) DEVICE — GLOVE ORANGE PI 7   MSG9070

## (undated) DEVICE — SUPPLEMENT DIGESTIVE H2O SOL GI-EASE

## (undated) DEVICE — PAD,ABDOMINAL,8"X10",ST,LF: Brand: MEDLINE

## (undated) DEVICE — Z INACTIVE NO USAGE TURNOVER KIT RM CLEANOP

## (undated) DEVICE — SUTURE PERMAHAND SZ 2-0 L18IN NONABSORBABLE BLK L26MM PS 1588H

## (undated) DEVICE — STERILE POLYISOPRENE POWDER-FREE SURGICAL GLOVES: Brand: PROTEXIS

## (undated) DEVICE — BLADE,CARBON-STEEL,11,STRL,DISPOSABLE,TB: Brand: MEDLINE

## (undated) DEVICE — DRAIN SURG 19FR 100% SIL RADPQ RND CHN FULL FLUT

## (undated) DEVICE — INTENT OT USE PROVIDES A STERILE INTERFACE BETWEEN THE OPERATING ROOM SURGICAL LAMPS (NON-STERILE) AND THE SURGEON OR STAFF WORKING IN THE STERILE FIELD.: Brand: ASPEN® ALC PLUS LIGHT HANDLE COVER

## (undated) DEVICE — NEEDLE HYPO 25GA L1.5IN BVL ORIENTED ECLIPSE

## (undated) DEVICE — PENCIL SMK EVAC ALL IN 1 DSGN ENH VISIBILITY IMPROVED AIR

## (undated) DEVICE — SPONGE GZ W4XL4IN COT 12 PLY TYP VII WVN C FLD DSGN STERILE

## (undated) DEVICE — SYRINGE IRRIG 60ML SFT PLIABLE BLB EZ TO GRP 1 HND USE W/

## (undated) DEVICE — X-RAY DETECTABLE SPONGES,16 PLY: Brand: VISTEC

## (undated) DEVICE — BASIN ST MAJOR-NO CAUTERY: Brand: MEDLINE INDUSTRIES, INC.

## (undated) DEVICE — DERMABOND SKIN ADH 0.7ML -- DERMABOND ADVANCED 12/BX

## (undated) DEVICE — SOLUTION IV 1000ML 0.9% SOD CHL

## (undated) DEVICE — SUTURE VICRYL + ABSORBABLE BRAIDED 2-0 FS1 27 IN WHT  VCP443H

## (undated) DEVICE — REM POLYHESIVE ADULT PATIENT RETURN ELECTRODE: Brand: VALLEYLAB

## (undated) DEVICE — STRIP SKIN CLSR W1XL5IN NYLON REINF CURAD STERIL

## (undated) DEVICE — HYPODERMIC SAFETY NEEDLE: Brand: MAGELLAN

## (undated) DEVICE — (D)SYR 10ML 1/5ML GRAD NSAF -- PKGING CHANGE USE ITEM 338027

## (undated) DEVICE — SUTURE ETHIBOND EXCEL SZ 0 L18IN NONABSORBABLE GRN L36MM CT-1 CX21D

## (undated) DEVICE — (D)PREP SKN CHLRAPRP APPL 26ML -- CONVERT TO ITEM 371833

## (undated) DEVICE — TUBING LIPO L10FT OD3IN HVY DUTY

## (undated) DEVICE — RESERVOIR,SUCTION,100CC,SILICONE: Brand: MEDLINE

## (undated) DEVICE — MARKER,SKIN,WI/RULER AND LABELS: Brand: MEDLINE

## (undated) DEVICE — APPLICATOR MEDICATED 26 CC SOLUTION HI LT ORNG CHLORAPREP

## (undated) DEVICE — SUTURE NONABSORBABLE MONOFILAMENT 2-0 FS 18 IN ETHILON 664H

## (undated) DEVICE — UNDERPAD INCONT 36X23 IN POLYESTER COTTON SLEEPDRI

## (undated) DEVICE — DECANTER BAG 9": Brand: MEDLINE INDUSTRIES, INC.

## (undated) DEVICE — SUTURE MONOCRYL + SZ 4-0 L27IN ABSRB UD L19MM PS-2 3/8 CIR MCP426H

## (undated) DEVICE — SUTURE MONOCRYL + SZ 3-0 L27IN ABSRB UD PS1 L24MM 3/8 CIR REV MCP936H

## (undated) DEVICE — DRAPE,REIN 53X77,STERILE: Brand: MEDLINE

## (undated) DEVICE — SUTURE MCRYL SZ 4-0 L27IN ABSRB UD L19MM PS-2 1/2 CIR PRIM Y426H

## (undated) DEVICE — TOWEL,OR,DSP,ST,BLUE,STD,4/PK,20PK/CS: Brand: MEDLINE

## (undated) DEVICE — SUTURE VICRYL + SZ 2-0 L27IN ABSRB WHT SH 1/2 CIR TAPERCUT VCP417H

## (undated) DEVICE — SOLUTION IRRIG 1000ML 09% SOD CHL USP PIC PLAS CONTAINER

## (undated) DEVICE — TUBING SUCT L9FT FOR AUTOFUSE INFLTR SYS

## (undated) DEVICE — SUTURE VCRL SZ 3-0 L27IN ABSRB UD L26MM SH 1/2 CIR J416H

## (undated) DEVICE — ELECTRODE PT RET AD L9FT HI MOIST COND ADH HYDRGEL CORDED

## (undated) DEVICE — SPONGE LAP W18XL18IN WHT COT 4 PLY FLD STRUNG RADPQ DISP ST 2 PER PACK